# Patient Record
Sex: MALE | Race: WHITE | Employment: UNEMPLOYED | URBAN - METROPOLITAN AREA
[De-identification: names, ages, dates, MRNs, and addresses within clinical notes are randomized per-mention and may not be internally consistent; named-entity substitution may affect disease eponyms.]

---

## 2017-01-11 ENCOUNTER — OFFICE VISIT (OUTPATIENT)
Dept: INTERNAL MEDICINE CLINIC | Age: 77
End: 2017-01-11

## 2017-01-11 VITALS
SYSTOLIC BLOOD PRESSURE: 124 MMHG | HEART RATE: 97 BPM | TEMPERATURE: 98.1 F | BODY MASS INDEX: 29.19 KG/M2 | RESPIRATION RATE: 16 BRPM | WEIGHT: 192 LBS | DIASTOLIC BLOOD PRESSURE: 71 MMHG | OXYGEN SATURATION: 97 %

## 2017-01-11 DIAGNOSIS — E11.9 CONTROLLED TYPE 2 DIABETES MELLITUS WITHOUT COMPLICATION, WITHOUT LONG-TERM CURRENT USE OF INSULIN (HCC): ICD-10-CM

## 2017-01-11 DIAGNOSIS — J01.00 ACUTE NON-RECURRENT MAXILLARY SINUSITIS: Primary | ICD-10-CM

## 2017-01-11 PROCEDURE — 99213 OFFICE O/P EST LOW 20 MIN: CPT | Performed by: INTERNAL MEDICINE

## 2017-01-11 PROCEDURE — G8510 SCR DEP NEG, NO PLAN REQD: HCPCS | Performed by: INTERNAL MEDICINE

## 2017-01-11 PROCEDURE — 3288F FALL RISK ASSESSMENT DOCD: CPT | Performed by: INTERNAL MEDICINE

## 2017-01-11 RX ORDER — FLUTICASONE PROPIONATE 50 MCG
2 SPRAY, SUSPENSION (ML) NASAL DAILY
Qty: 1 BOTTLE | Refills: 0 | Status: SHIPPED | OUTPATIENT
Start: 2017-01-11 | End: 2017-02-27 | Stop reason: SDUPTHER

## 2017-01-11 RX ORDER — LEVOFLOXACIN 500 MG/1
500 TABLET, FILM COATED ORAL DAILY
Qty: 10 TABLET | Refills: 0 | Status: SHIPPED | OUTPATIENT
Start: 2017-01-11 | End: 2017-01-21

## 2017-01-11 ASSESSMENT — PATIENT HEALTH QUESTIONNAIRE - PHQ9
2. FEELING DOWN, DEPRESSED OR HOPELESS: 0
SUM OF ALL RESPONSES TO PHQ9 QUESTIONS 1 & 2: 0
1. LITTLE INTEREST OR PLEASURE IN DOING THINGS: 0
SUM OF ALL RESPONSES TO PHQ QUESTIONS 1-9: 0

## 2017-02-14 ENCOUNTER — OFFICE VISIT (OUTPATIENT)
Dept: INTERNAL MEDICINE CLINIC | Age: 77
End: 2017-02-14

## 2017-02-14 VITALS
RESPIRATION RATE: 14 BRPM | SYSTOLIC BLOOD PRESSURE: 122 MMHG | BODY MASS INDEX: 29.19 KG/M2 | WEIGHT: 192 LBS | HEART RATE: 83 BPM | DIASTOLIC BLOOD PRESSURE: 82 MMHG | OXYGEN SATURATION: 97 %

## 2017-02-14 DIAGNOSIS — Z23 NEED FOR PNEUMOCOCCAL VACCINATION: ICD-10-CM

## 2017-02-14 DIAGNOSIS — E11.9 CONTROLLED TYPE 2 DIABETES MELLITUS WITHOUT COMPLICATION, WITHOUT LONG-TERM CURRENT USE OF INSULIN (HCC): ICD-10-CM

## 2017-02-14 DIAGNOSIS — H35.3210: ICD-10-CM

## 2017-02-14 DIAGNOSIS — I70.0 ATHEROSCLEROSIS OF AORTA (HCC): ICD-10-CM

## 2017-02-14 DIAGNOSIS — E78.2 MIXED HYPERLIPIDEMIA: ICD-10-CM

## 2017-02-14 DIAGNOSIS — C18.9 ADENOCARCINOMA OF COLON (HCC): ICD-10-CM

## 2017-02-14 DIAGNOSIS — I10 ESSENTIAL HYPERTENSION: ICD-10-CM

## 2017-02-14 DIAGNOSIS — E11.9 CONTROLLED TYPE 2 DIABETES MELLITUS WITHOUT COMPLICATION, WITHOUT LONG-TERM CURRENT USE OF INSULIN (HCC): Primary | ICD-10-CM

## 2017-02-14 DIAGNOSIS — F41.9 ANXIETY: ICD-10-CM

## 2017-02-14 LAB
A/G RATIO: 2.4 (ref 1.1–2.2)
ALBUMIN SERPL-MCNC: 5.1 G/DL (ref 3.4–5)
ALP BLD-CCNC: 76 U/L (ref 40–129)
ALT SERPL-CCNC: 22 U/L (ref 10–40)
ANION GAP SERPL CALCULATED.3IONS-SCNC: 17 MMOL/L (ref 3–16)
AST SERPL-CCNC: 23 U/L (ref 15–37)
BASOPHILS ABSOLUTE: 0 K/UL (ref 0–0.2)
BASOPHILS RELATIVE PERCENT: 1.2 %
BILIRUB SERPL-MCNC: 0.8 MG/DL (ref 0–1)
BUN BLDV-MCNC: 34 MG/DL (ref 7–20)
CALCIUM SERPL-MCNC: 9.8 MG/DL (ref 8.3–10.6)
CHLORIDE BLD-SCNC: 100 MMOL/L (ref 99–110)
CHOLESTEROL, TOTAL: 149 MG/DL (ref 0–199)
CO2: 27 MMOL/L (ref 21–32)
CREAT SERPL-MCNC: 1.4 MG/DL (ref 0.8–1.3)
EOSINOPHILS ABSOLUTE: 0.1 K/UL (ref 0–0.6)
EOSINOPHILS RELATIVE PERCENT: 2.7 %
GFR AFRICAN AMERICAN: 60
GFR NON-AFRICAN AMERICAN: 49
GLOBULIN: 2.1 G/DL
GLUCOSE BLD-MCNC: 182 MG/DL (ref 70–99)
HCT VFR BLD CALC: 45.5 % (ref 40.5–52.5)
HDLC SERPL-MCNC: 57 MG/DL (ref 40–60)
HEMOGLOBIN: 15 G/DL (ref 13.5–17.5)
LDL CHOLESTEROL CALCULATED: 67 MG/DL
LYMPHOCYTES ABSOLUTE: 0.7 K/UL (ref 1–5.1)
LYMPHOCYTES RELATIVE PERCENT: 20.3 %
MCH RBC QN AUTO: 31.6 PG (ref 26–34)
MCHC RBC AUTO-ENTMCNC: 33 G/DL (ref 31–36)
MCV RBC AUTO: 95.9 FL (ref 80–100)
MONOCYTES ABSOLUTE: 0.3 K/UL (ref 0–1.3)
MONOCYTES RELATIVE PERCENT: 9.5 %
NEUTROPHILS ABSOLUTE: 2.4 K/UL (ref 1.7–7.7)
NEUTROPHILS RELATIVE PERCENT: 66.3 %
PDW BLD-RTO: 13.9 % (ref 12.4–15.4)
PLATELET # BLD: 150 K/UL (ref 135–450)
PMV BLD AUTO: 10.6 FL (ref 5–10.5)
POTASSIUM SERPL-SCNC: 4.8 MMOL/L (ref 3.5–5.1)
RBC # BLD: 4.74 M/UL (ref 4.2–5.9)
SODIUM BLD-SCNC: 144 MMOL/L (ref 136–145)
TOTAL PROTEIN: 7.2 G/DL (ref 6.4–8.2)
TRIGL SERPL-MCNC: 125 MG/DL (ref 0–150)
VLDLC SERPL CALC-MCNC: 25 MG/DL
WBC # BLD: 3.6 K/UL (ref 4–11)

## 2017-02-14 PROCEDURE — 90670 PCV13 VACCINE IM: CPT | Performed by: INTERNAL MEDICINE

## 2017-02-14 PROCEDURE — G0009 ADMIN PNEUMOCOCCAL VACCINE: HCPCS | Performed by: INTERNAL MEDICINE

## 2017-02-14 PROCEDURE — 99214 OFFICE O/P EST MOD 30 MIN: CPT | Performed by: INTERNAL MEDICINE

## 2017-02-14 RX ORDER — HYDROCHLOROTHIAZIDE 25 MG/1
TABLET ORAL
Qty: 30 TABLET | Refills: 5 | Status: SHIPPED | OUTPATIENT
Start: 2017-02-14 | End: 2017-05-16 | Stop reason: SDUPTHER

## 2017-02-14 RX ORDER — GLIPIZIDE 5 MG/1
TABLET, FILM COATED, EXTENDED RELEASE ORAL
Qty: 60 TABLET | Refills: 5 | Status: SHIPPED | OUTPATIENT
Start: 2017-02-14 | End: 2017-02-15 | Stop reason: SDUPTHER

## 2017-02-14 RX ORDER — LISINOPRIL 40 MG/1
40 TABLET ORAL DAILY
Qty: 30 TABLET | Refills: 5 | Status: SHIPPED | OUTPATIENT
Start: 2017-02-14 | End: 2018-05-15 | Stop reason: SDUPTHER

## 2017-02-14 RX ORDER — AMLODIPINE BESYLATE 2.5 MG/1
TABLET ORAL
Qty: 30 TABLET | Refills: 5 | Status: SHIPPED | OUTPATIENT
Start: 2017-02-14 | End: 2017-05-16 | Stop reason: SDUPTHER

## 2017-02-14 RX ORDER — ALPRAZOLAM 0.25 MG/1
0.25 TABLET ORAL 2 TIMES DAILY PRN
Qty: 60 TABLET | Refills: 1 | Status: CANCELLED | OUTPATIENT
Start: 2017-02-14

## 2017-02-14 RX ORDER — ROSUVASTATIN CALCIUM 10 MG/1
10 TABLET, COATED ORAL DAILY
Qty: 30 TABLET | Refills: 5 | Status: CANCELLED | OUTPATIENT
Start: 2017-02-14

## 2017-02-15 LAB
ESTIMATED AVERAGE GLUCOSE: 197.3 MG/DL
HBA1C MFR BLD: 8.5 %

## 2017-02-15 RX ORDER — GLIPIZIDE 5 MG/1
TABLET, FILM COATED, EXTENDED RELEASE ORAL
Qty: 60 TABLET | Refills: 5
Start: 2017-02-15 | End: 2017-05-16 | Stop reason: SDUPTHER

## 2017-02-27 ENCOUNTER — OFFICE VISIT (OUTPATIENT)
Dept: INTERNAL MEDICINE CLINIC | Age: 77
End: 2017-02-27

## 2017-02-27 VITALS — OXYGEN SATURATION: 98 % | DIASTOLIC BLOOD PRESSURE: 88 MMHG | TEMPERATURE: 74 F | SYSTOLIC BLOOD PRESSURE: 174 MMHG

## 2017-02-27 DIAGNOSIS — J01.00 ACUTE NON-RECURRENT MAXILLARY SINUSITIS: ICD-10-CM

## 2017-02-27 PROCEDURE — 99213 OFFICE O/P EST LOW 20 MIN: CPT | Performed by: NURSE PRACTITIONER

## 2017-02-27 RX ORDER — CETIRIZINE HYDROCHLORIDE 10 MG/1
10 TABLET ORAL DAILY
Qty: 30 TABLET | Refills: 3 | Status: ON HOLD | OUTPATIENT
Start: 2017-02-27 | End: 2017-12-09 | Stop reason: SDDI

## 2017-02-27 RX ORDER — FLUTICASONE PROPIONATE 50 MCG
2 SPRAY, SUSPENSION (ML) NASAL DAILY
Qty: 1 BOTTLE | Refills: 0 | Status: SHIPPED | OUTPATIENT
Start: 2017-02-27 | End: 2021-01-18

## 2017-03-06 ENCOUNTER — HOSPITAL ENCOUNTER (OUTPATIENT)
Dept: ULTRASOUND IMAGING | Age: 77
Discharge: HOME | End: 2017-03-06
Attending: INTERNAL MEDICINE | Admitting: INTERNAL MEDICINE

## 2017-03-06 DIAGNOSIS — I70.0 ATHEROSCLEROSIS OF AORTA (HCC): ICD-10-CM

## 2017-05-04 DIAGNOSIS — I10 ESSENTIAL HYPERTENSION: ICD-10-CM

## 2017-05-04 RX ORDER — AMLODIPINE BESYLATE 2.5 MG/1
TABLET ORAL
Qty: 30 TABLET | Refills: 5 | Status: SHIPPED | OUTPATIENT
Start: 2017-05-04 | End: 2017-08-14

## 2017-05-14 DIAGNOSIS — E11.9 CONTROLLED TYPE 2 DIABETES MELLITUS WITHOUT COMPLICATION, WITHOUT LONG-TERM CURRENT USE OF INSULIN (HCC): ICD-10-CM

## 2017-05-14 DIAGNOSIS — I10 ESSENTIAL HYPERTENSION: ICD-10-CM

## 2017-05-15 RX ORDER — LISINOPRIL 40 MG/1
TABLET ORAL
Qty: 30 TABLET | Refills: 5 | Status: SHIPPED | OUTPATIENT
Start: 2017-05-15 | End: 2017-10-31 | Stop reason: SDUPTHER

## 2017-05-16 ENCOUNTER — OFFICE VISIT (OUTPATIENT)
Dept: INTERNAL MEDICINE CLINIC | Age: 77
End: 2017-05-16

## 2017-05-16 VITALS
BODY MASS INDEX: 28.95 KG/M2 | WEIGHT: 191 LBS | SYSTOLIC BLOOD PRESSURE: 122 MMHG | RESPIRATION RATE: 16 BRPM | HEIGHT: 68 IN | HEART RATE: 87 BPM | OXYGEN SATURATION: 97 % | DIASTOLIC BLOOD PRESSURE: 80 MMHG

## 2017-05-16 DIAGNOSIS — B35.1 ONYCHOMYCOSIS: ICD-10-CM

## 2017-05-16 DIAGNOSIS — E11.9 CONTROLLED TYPE 2 DIABETES MELLITUS WITHOUT COMPLICATION, WITHOUT LONG-TERM CURRENT USE OF INSULIN (HCC): ICD-10-CM

## 2017-05-16 DIAGNOSIS — E11.9 CONTROLLED TYPE 2 DIABETES MELLITUS WITHOUT COMPLICATION, WITHOUT LONG-TERM CURRENT USE OF INSULIN (HCC): Primary | ICD-10-CM

## 2017-05-16 DIAGNOSIS — I10 ESSENTIAL HYPERTENSION: ICD-10-CM

## 2017-05-16 DIAGNOSIS — E78.2 MIXED HYPERLIPIDEMIA: ICD-10-CM

## 2017-05-16 LAB
A/G RATIO: 2.3 (ref 1.1–2.2)
ALBUMIN SERPL-MCNC: 4.6 G/DL (ref 3.4–5)
ALP BLD-CCNC: 66 U/L (ref 40–129)
ALT SERPL-CCNC: 17 U/L (ref 10–40)
ANION GAP SERPL CALCULATED.3IONS-SCNC: 17 MMOL/L (ref 3–16)
AST SERPL-CCNC: 22 U/L (ref 15–37)
BASOPHILS ABSOLUTE: 0 K/UL (ref 0–0.2)
BASOPHILS RELATIVE PERCENT: 0.5 %
BILIRUB SERPL-MCNC: 0.7 MG/DL (ref 0–1)
BUN BLDV-MCNC: 27 MG/DL (ref 7–20)
CALCIUM SERPL-MCNC: 9 MG/DL (ref 8.3–10.6)
CHLORIDE BLD-SCNC: 98 MMOL/L (ref 99–110)
CHOLESTEROL, TOTAL: 123 MG/DL (ref 0–199)
CO2: 24 MMOL/L (ref 21–32)
CREAT SERPL-MCNC: 1.2 MG/DL (ref 0.8–1.3)
CREATININE URINE: 151.3 MG/DL (ref 39–259)
EOSINOPHILS ABSOLUTE: 0.1 K/UL (ref 0–0.6)
EOSINOPHILS RELATIVE PERCENT: 2.9 %
GFR AFRICAN AMERICAN: >60
GFR NON-AFRICAN AMERICAN: 59
GLOBULIN: 2 G/DL
GLUCOSE BLD-MCNC: 157 MG/DL (ref 70–99)
HCT VFR BLD CALC: 45.3 % (ref 40.5–52.5)
HDLC SERPL-MCNC: 46 MG/DL (ref 40–60)
HEMOGLOBIN: 14.8 G/DL (ref 13.5–17.5)
LDL CHOLESTEROL CALCULATED: 59 MG/DL
LYMPHOCYTES ABSOLUTE: 0.8 K/UL (ref 1–5.1)
LYMPHOCYTES RELATIVE PERCENT: 24.9 %
MCH RBC QN AUTO: 31.7 PG (ref 26–34)
MCHC RBC AUTO-ENTMCNC: 32.7 G/DL (ref 31–36)
MCV RBC AUTO: 96.9 FL (ref 80–100)
MICROALBUMIN UR-MCNC: 3 MG/DL
MICROALBUMIN/CREAT UR-RTO: 19.8 MG/G (ref 0–30)
MONOCYTES ABSOLUTE: 0.3 K/UL (ref 0–1.3)
MONOCYTES RELATIVE PERCENT: 8.8 %
NEUTROPHILS ABSOLUTE: 2 K/UL (ref 1.7–7.7)
NEUTROPHILS RELATIVE PERCENT: 62.9 %
PDW BLD-RTO: 13.5 % (ref 12.4–15.4)
PLATELET # BLD: 148 K/UL (ref 135–450)
PMV BLD AUTO: 10.4 FL (ref 5–10.5)
POTASSIUM SERPL-SCNC: 4.1 MMOL/L (ref 3.5–5.1)
RBC # BLD: 4.67 M/UL (ref 4.2–5.9)
SODIUM BLD-SCNC: 139 MMOL/L (ref 136–145)
TOTAL PROTEIN: 6.6 G/DL (ref 6.4–8.2)
TRIGL SERPL-MCNC: 92 MG/DL (ref 0–150)
VLDLC SERPL CALC-MCNC: 18 MG/DL
WBC # BLD: 3.1 K/UL (ref 4–11)

## 2017-05-16 PROCEDURE — 99214 OFFICE O/P EST MOD 30 MIN: CPT | Performed by: INTERNAL MEDICINE

## 2017-05-16 RX ORDER — GLIPIZIDE 5 MG/1
TABLET, FILM COATED, EXTENDED RELEASE ORAL
Qty: 60 TABLET | Refills: 5 | Status: SHIPPED | OUTPATIENT
Start: 2017-05-16 | End: 2017-11-14 | Stop reason: SDUPTHER

## 2017-05-16 RX ORDER — KETOCONAZOLE 20 MG/G
CREAM TOPICAL
Qty: 30 G | Refills: 3 | Status: SHIPPED | OUTPATIENT
Start: 2017-05-16 | End: 2019-07-16 | Stop reason: ALTCHOICE

## 2017-05-16 RX ORDER — ROSUVASTATIN CALCIUM 10 MG/1
10 TABLET, COATED ORAL DAILY
Qty: 30 TABLET | Refills: 5 | Status: SHIPPED | OUTPATIENT
Start: 2017-05-16 | End: 2017-11-14 | Stop reason: SDUPTHER

## 2017-05-16 RX ORDER — AMLODIPINE BESYLATE 2.5 MG/1
TABLET ORAL
Qty: 30 TABLET | Refills: 5 | Status: SHIPPED | OUTPATIENT
Start: 2017-05-16 | End: 2017-11-14 | Stop reason: SDUPTHER

## 2017-05-16 RX ORDER — HYDROCHLOROTHIAZIDE 25 MG/1
TABLET ORAL
Qty: 30 TABLET | Refills: 5 | Status: SHIPPED | OUTPATIENT
Start: 2017-05-16 | End: 2017-11-14 | Stop reason: SDUPTHER

## 2017-05-17 LAB
ESTIMATED AVERAGE GLUCOSE: 157.1 MG/DL
HBA1C MFR BLD: 7.1 %

## 2017-05-26 ENCOUNTER — OFFICE VISIT (OUTPATIENT)
Dept: INTERNAL MEDICINE CLINIC | Age: 77
End: 2017-05-26

## 2017-05-26 VITALS
BODY MASS INDEX: 29.25 KG/M2 | DIASTOLIC BLOOD PRESSURE: 82 MMHG | HEIGHT: 68 IN | SYSTOLIC BLOOD PRESSURE: 144 MMHG | HEART RATE: 64 BPM | RESPIRATION RATE: 18 BRPM | OXYGEN SATURATION: 98 % | WEIGHT: 193 LBS | TEMPERATURE: 98 F

## 2017-05-26 DIAGNOSIS — I88.9 CERVICAL LYMPHADENITIS: ICD-10-CM

## 2017-05-26 DIAGNOSIS — H60.391 OTHER INFECTIVE ACUTE OTITIS EXTERNA OF RIGHT EAR: Primary | ICD-10-CM

## 2017-05-26 PROCEDURE — 99213 OFFICE O/P EST LOW 20 MIN: CPT | Performed by: INTERNAL MEDICINE

## 2017-05-26 RX ORDER — SULFAMETHOXAZOLE AND TRIMETHOPRIM 800; 160 MG/1; MG/1
1 TABLET ORAL 2 TIMES DAILY
Qty: 20 TABLET | Refills: 0 | Status: SHIPPED | OUTPATIENT
Start: 2017-05-26 | End: 2017-06-05

## 2017-05-31 ENCOUNTER — OFFICE VISIT (OUTPATIENT)
Dept: INTERNAL MEDICINE CLINIC | Age: 77
End: 2017-05-31

## 2017-05-31 VITALS
DIASTOLIC BLOOD PRESSURE: 76 MMHG | BODY MASS INDEX: 29.25 KG/M2 | WEIGHT: 193 LBS | HEIGHT: 68 IN | OXYGEN SATURATION: 97 % | HEART RATE: 76 BPM | RESPIRATION RATE: 16 BRPM | SYSTOLIC BLOOD PRESSURE: 128 MMHG

## 2017-05-31 DIAGNOSIS — H60.12 CELLULITIS OF LEFT EAR: Primary | ICD-10-CM

## 2017-05-31 PROCEDURE — 99213 OFFICE O/P EST LOW 20 MIN: CPT | Performed by: INTERNAL MEDICINE

## 2017-05-31 RX ORDER — AMOXICILLIN AND CLAVULANATE POTASSIUM 875; 125 MG/1; MG/1
1 TABLET, FILM COATED ORAL 2 TIMES DAILY WITH MEALS
Qty: 20 TABLET | Refills: 0 | Status: SHIPPED | OUTPATIENT
Start: 2017-05-31 | End: 2017-06-07

## 2017-05-31 RX ORDER — CLINDAMYCIN HYDROCHLORIDE 300 MG/1
300 CAPSULE ORAL 3 TIMES DAILY
Qty: 30 CAPSULE | Refills: 0 | Status: SHIPPED | OUTPATIENT
Start: 2017-05-31 | End: 2017-06-10

## 2017-05-31 RX ORDER — PREDNISONE 1 MG/1
TABLET ORAL
Qty: 36 TABLET | Refills: 0 | Status: SHIPPED | OUTPATIENT
Start: 2017-05-31 | End: 2017-06-07

## 2017-06-07 ENCOUNTER — OFFICE VISIT (OUTPATIENT)
Dept: INTERNAL MEDICINE CLINIC | Age: 77
End: 2017-06-07

## 2017-06-07 VITALS
HEART RATE: 83 BPM | BODY MASS INDEX: 28.74 KG/M2 | SYSTOLIC BLOOD PRESSURE: 120 MMHG | OXYGEN SATURATION: 97 % | RESPIRATION RATE: 16 BRPM | DIASTOLIC BLOOD PRESSURE: 70 MMHG | WEIGHT: 189 LBS

## 2017-06-07 DIAGNOSIS — E11.9 CONTROLLED TYPE 2 DIABETES MELLITUS WITHOUT COMPLICATION, WITHOUT LONG-TERM CURRENT USE OF INSULIN (HCC): ICD-10-CM

## 2017-06-07 DIAGNOSIS — H60.12 CELLULITIS OF LEFT EXTERNAL EAR: Primary | ICD-10-CM

## 2017-06-07 PROCEDURE — 99213 OFFICE O/P EST LOW 20 MIN: CPT | Performed by: INTERNAL MEDICINE

## 2017-07-20 ENCOUNTER — OFFICE VISIT (OUTPATIENT)
Dept: INTERNAL MEDICINE CLINIC | Age: 77
End: 2017-07-20

## 2017-07-20 VITALS
DIASTOLIC BLOOD PRESSURE: 92 MMHG | SYSTOLIC BLOOD PRESSURE: 172 MMHG | OXYGEN SATURATION: 96 % | BODY MASS INDEX: 28.89 KG/M2 | RESPIRATION RATE: 16 BRPM | HEART RATE: 83 BPM | WEIGHT: 190 LBS

## 2017-07-20 DIAGNOSIS — I10 ESSENTIAL HYPERTENSION: Primary | ICD-10-CM

## 2017-07-20 PROCEDURE — 99213 OFFICE O/P EST LOW 20 MIN: CPT | Performed by: INTERNAL MEDICINE

## 2017-07-20 RX ORDER — PROPRANOLOL HYDROCHLORIDE 10 MG/1
10 TABLET ORAL 2 TIMES DAILY
Qty: 60 TABLET | Refills: 3 | Status: SHIPPED | OUTPATIENT
Start: 2017-07-20 | End: 2017-11-04 | Stop reason: SDUPTHER

## 2017-08-14 ENCOUNTER — OFFICE VISIT (OUTPATIENT)
Dept: INTERNAL MEDICINE CLINIC | Age: 77
End: 2017-08-14

## 2017-08-14 ENCOUNTER — HOSPITAL ENCOUNTER (OUTPATIENT)
Dept: GENERAL RADIOLOGY | Age: 77
Discharge: HOME | End: 2017-08-14
Attending: INTERNAL MEDICINE | Admitting: INTERNAL MEDICINE

## 2017-08-14 VITALS
BODY MASS INDEX: 29.07 KG/M2 | DIASTOLIC BLOOD PRESSURE: 80 MMHG | OXYGEN SATURATION: 98 % | SYSTOLIC BLOOD PRESSURE: 140 MMHG | WEIGHT: 191.2 LBS | HEART RATE: 58 BPM

## 2017-08-14 DIAGNOSIS — Z00.00 ROUTINE GENERAL MEDICAL EXAMINATION AT A HEALTH CARE FACILITY: Primary | ICD-10-CM

## 2017-08-14 DIAGNOSIS — E78.2 MIXED HYPERLIPIDEMIA: ICD-10-CM

## 2017-08-14 DIAGNOSIS — F41.9 ANXIETY: ICD-10-CM

## 2017-08-14 DIAGNOSIS — M25.551 RIGHT HIP PAIN: ICD-10-CM

## 2017-08-14 DIAGNOSIS — I10 ESSENTIAL HYPERTENSION: ICD-10-CM

## 2017-08-14 DIAGNOSIS — Z23 NEED FOR INFLUENZA VACCINATION: ICD-10-CM

## 2017-08-14 DIAGNOSIS — C18.9 ADENOCARCINOMA OF COLON (HCC): ICD-10-CM

## 2017-08-14 DIAGNOSIS — E11.9 CONTROLLED TYPE 2 DIABETES MELLITUS WITHOUT COMPLICATION, WITHOUT LONG-TERM CURRENT USE OF INSULIN (HCC): ICD-10-CM

## 2017-08-14 PROCEDURE — G0439 PPPS, SUBSEQ VISIT: HCPCS | Performed by: INTERNAL MEDICINE

## 2017-08-14 PROCEDURE — 90662 IIV NO PRSV INCREASED AG IM: CPT | Performed by: INTERNAL MEDICINE

## 2017-08-14 PROCEDURE — G0008 ADMIN INFLUENZA VIRUS VAC: HCPCS | Performed by: INTERNAL MEDICINE

## 2017-08-14 RX ORDER — BUSPIRONE HYDROCHLORIDE 10 MG/1
10 TABLET ORAL 2 TIMES DAILY PRN
Qty: 60 TABLET | Refills: 5 | Status: ON HOLD | OUTPATIENT
Start: 2017-08-14 | End: 2017-12-09 | Stop reason: SDDI

## 2017-08-14 ASSESSMENT — LIFESTYLE VARIABLES: HOW OFTEN DO YOU HAVE A DRINK CONTAINING ALCOHOL: 0

## 2017-08-14 ASSESSMENT — PATIENT HEALTH QUESTIONNAIRE - PHQ9: SUM OF ALL RESPONSES TO PHQ QUESTIONS 1-9: 0

## 2017-08-14 ASSESSMENT — ANXIETY QUESTIONNAIRES: GAD7 TOTAL SCORE: 0

## 2017-08-15 ENCOUNTER — TELEPHONE (OUTPATIENT)
Dept: INTERNAL MEDICINE CLINIC | Age: 77
End: 2017-08-15

## 2017-08-15 RX ORDER — MELOXICAM 7.5 MG/1
7.5 TABLET ORAL DAILY
Qty: 30 TABLET | Refills: 1 | Status: SHIPPED | OUTPATIENT
Start: 2017-08-15 | End: 2017-11-14

## 2017-09-21 DIAGNOSIS — I10 ESSENTIAL HYPERTENSION: ICD-10-CM

## 2017-09-21 DIAGNOSIS — E78.2 MIXED HYPERLIPIDEMIA: ICD-10-CM

## 2017-09-21 DIAGNOSIS — E11.9 CONTROLLED TYPE 2 DIABETES MELLITUS WITHOUT COMPLICATION, WITHOUT LONG-TERM CURRENT USE OF INSULIN (HCC): ICD-10-CM

## 2017-09-21 LAB
A/G RATIO: 2.1 (ref 1.1–2.2)
ALBUMIN SERPL-MCNC: 4.5 G/DL (ref 3.4–5)
ALP BLD-CCNC: 61 U/L (ref 40–129)
ALT SERPL-CCNC: 16 U/L (ref 10–40)
ANION GAP SERPL CALCULATED.3IONS-SCNC: 19 MMOL/L (ref 3–16)
AST SERPL-CCNC: 19 U/L (ref 15–37)
BILIRUB SERPL-MCNC: 0.8 MG/DL (ref 0–1)
BUN BLDV-MCNC: 25 MG/DL (ref 7–20)
CALCIUM SERPL-MCNC: 9.4 MG/DL (ref 8.3–10.6)
CHLORIDE BLD-SCNC: 99 MMOL/L (ref 99–110)
CHOLESTEROL, TOTAL: 135 MG/DL (ref 0–199)
CO2: 24 MMOL/L (ref 21–32)
CREAT SERPL-MCNC: 1.2 MG/DL (ref 0.8–1.3)
GFR AFRICAN AMERICAN: >60
GFR NON-AFRICAN AMERICAN: 59
GLOBULIN: 2.1 G/DL
GLUCOSE BLD-MCNC: 180 MG/DL (ref 70–99)
HDLC SERPL-MCNC: 49 MG/DL (ref 40–60)
LDL CHOLESTEROL CALCULATED: 62 MG/DL
POTASSIUM SERPL-SCNC: 4 MMOL/L (ref 3.5–5.1)
SODIUM BLD-SCNC: 142 MMOL/L (ref 136–145)
TOTAL PROTEIN: 6.6 G/DL (ref 6.4–8.2)
TRIGL SERPL-MCNC: 119 MG/DL (ref 0–150)
TSH SERPL DL<=0.05 MIU/L-ACNC: 3.26 UIU/ML (ref 0.27–4.2)
VLDLC SERPL CALC-MCNC: 24 MG/DL

## 2017-09-22 LAB
ESTIMATED AVERAGE GLUCOSE: 174.3 MG/DL
HBA1C MFR BLD: 7.7 %

## 2017-10-31 DIAGNOSIS — I10 ESSENTIAL HYPERTENSION: ICD-10-CM

## 2017-10-31 DIAGNOSIS — E11.9 CONTROLLED TYPE 2 DIABETES MELLITUS WITHOUT COMPLICATION, WITHOUT LONG-TERM CURRENT USE OF INSULIN (HCC): ICD-10-CM

## 2017-10-31 RX ORDER — LISINOPRIL 40 MG/1
TABLET ORAL
Qty: 30 TABLET | Refills: 5 | Status: SHIPPED | OUTPATIENT
Start: 2017-10-31 | End: 2017-11-14 | Stop reason: SDUPTHER

## 2017-11-04 DIAGNOSIS — I10 ESSENTIAL HYPERTENSION: ICD-10-CM

## 2017-11-06 RX ORDER — PROPRANOLOL HYDROCHLORIDE 10 MG/1
10 TABLET ORAL 2 TIMES DAILY
Qty: 60 TABLET | Refills: 3 | Status: SHIPPED | OUTPATIENT
Start: 2017-11-06 | End: 2018-02-27 | Stop reason: SDUPTHER

## 2017-11-14 ENCOUNTER — OFFICE VISIT (OUTPATIENT)
Dept: INTERNAL MEDICINE CLINIC | Age: 77
End: 2017-11-14

## 2017-11-14 VITALS
DIASTOLIC BLOOD PRESSURE: 90 MMHG | WEIGHT: 184 LBS | BODY MASS INDEX: 27.98 KG/M2 | OXYGEN SATURATION: 98 % | RESPIRATION RATE: 16 BRPM | HEART RATE: 79 BPM | SYSTOLIC BLOOD PRESSURE: 130 MMHG

## 2017-11-14 DIAGNOSIS — C18.9 ADENOCARCINOMA OF COLON (HCC): ICD-10-CM

## 2017-11-14 DIAGNOSIS — M25.559 ARTHRALGIA OF HIP, UNSPECIFIED LATERALITY: ICD-10-CM

## 2017-11-14 DIAGNOSIS — E11.9 CONTROLLED TYPE 2 DIABETES MELLITUS WITHOUT COMPLICATION, WITHOUT LONG-TERM CURRENT USE OF INSULIN (HCC): Primary | ICD-10-CM

## 2017-11-14 DIAGNOSIS — I10 ESSENTIAL HYPERTENSION: ICD-10-CM

## 2017-11-14 DIAGNOSIS — E78.2 MIXED HYPERLIPIDEMIA: ICD-10-CM

## 2017-11-14 PROCEDURE — 99214 OFFICE O/P EST MOD 30 MIN: CPT | Performed by: INTERNAL MEDICINE

## 2017-11-14 RX ORDER — ROSUVASTATIN CALCIUM 10 MG/1
10 TABLET, COATED ORAL DAILY
Qty: 30 TABLET | Refills: 5 | Status: SHIPPED | OUTPATIENT
Start: 2017-11-14 | End: 2018-05-15 | Stop reason: SDUPTHER

## 2017-11-14 RX ORDER — GLIPIZIDE 5 MG/1
TABLET, FILM COATED, EXTENDED RELEASE ORAL
Qty: 60 TABLET | Refills: 5 | Status: SHIPPED | OUTPATIENT
Start: 2017-11-14 | End: 2018-05-15 | Stop reason: SDUPTHER

## 2017-11-14 RX ORDER — HYDROCHLOROTHIAZIDE 25 MG/1
TABLET ORAL
Qty: 30 TABLET | Refills: 5 | Status: SHIPPED | OUTPATIENT
Start: 2017-11-14 | End: 2018-05-15 | Stop reason: SDUPTHER

## 2017-11-14 RX ORDER — AMLODIPINE BESYLATE 2.5 MG/1
TABLET ORAL
Qty: 30 TABLET | Refills: 5 | Status: SHIPPED | OUTPATIENT
Start: 2017-11-14 | End: 2018-05-15 | Stop reason: SDUPTHER

## 2017-11-14 NOTE — PROGRESS NOTES
Midge Bowels  1940 11/14/17    SUBJECTIVE:  Colon CA- for recheck 2020 as had colonoscopy Dr Rosina Stubbs in March this yr. DM- sugars stable, sl higher on inderal.    Lab Results   Component Value Date    LABA1C 7.7 09/21/2017    LABA1C 7.1 05/16/2017    LABA1C 8.5 02/14/2017     Lab Results   Component Value Date    LABMICR 3.00 (H) 05/16/2017    LDLCALC 62 09/21/2017    CREATININE 1.2 09/21/2017   due for foot exam.    Hypertension: Stable. Denies CP, SOB, cough, visual changes, dizziness, palpitations or HA. STARTED INDERAL AND RUNNING ~130S, PULSE 60S. Lipids:  Is continuing statin therapy and low fat diet. Tolerating medications w/o myalgias or GI upset. Prior hip pain is better and now off mobic. OBJECTIVE:    BP (!) 130/90   Pulse 79   Resp 16   Wt 184 lb (83.5 kg)   SpO2 98%   BMI 27.98 kg/m²     Physical Exam   Constitutional: He appears well-developed and well-nourished. No distress. HENT:   Head: Normocephalic and atraumatic. Nose: Nose normal.   Mouth/Throat: Oropharynx is clear and moist. No oropharyngeal exudate. Eyes: Conjunctivae and EOM are normal. Pupils are equal, round, and reactive to light. Right eye exhibits no discharge. Left eye exhibits no discharge. No scleral icterus. Neck: Normal range of motion. Neck supple. No tracheal deviation present. Cardiovascular: Normal rate, regular rhythm, normal heart sounds and intact distal pulses. Exam reveals no gallop and no friction rub. No murmur heard. Pulmonary/Chest: Effort normal and breath sounds normal. No respiratory distress. He has no wheezes. He has no rales. Abdominal: Soft. Bowel sounds are normal. He exhibits no distension and no mass. There is no tenderness. There is no rebound and no guarding. Musculoskeletal: He exhibits no edema or tenderness. Lymphadenopathy:     He has no cervical adenopathy. Neurological: He is alert. Skin: Skin is warm and dry.    FOOT EXAM  Visual inspection:  Deformity/amputation: absent  Skin lesions/pre-ulcerative calluses: absent  Edema: right- negative, left- negative    Sensory exam:  Monofilament sensation: normal  (minimum of 5 random plantar locations tested, avoiding callused areas - > 1 area with absence of sensation is + for neuropathy)      Pulses: normal       Psychiatric: He has a normal mood and affect. His behavior is normal.   Vitals reviewed. ASSESSMENT:    1. Controlled type 2 diabetes mellitus without complication, without long-term current use of insulin ([address]    2. Essential hypertension    3. Mixed hyperlipidemia    4. Adenocarcinoma of colon [facility])        PLAN:    Jacquelin Rodarte was seen today for 3 month follow-up. Diagnoses and all orders for this visit:    Controlled type 2 diabetes mellitus without complication, without long-term current use of insulin ([address] - DM relatively well controlled and will continue current regimen. Screening reviewed. See orders.    -     glipiZIDE (GLUCOTROL XL) 5 MG extended release tablet; TAKE 10 mg in AM and then 5 mg tablet BY mouth at HS ( Do not fill until patient calls)  -     metFORMIN (GLUCOPHAGE) 500 MG tablet; Take 1 tablet by mouth daily (with breakfast) (Do not fill until patient calls)  -     Hemoglobin A1C  -     Lipid Panel  -     Comprehensive Metabolic Panel  -     CBC Auto Differential  -      DIABETES FOOT EXAM    Essential hypertension - Blood pressure stable and will continue current regimen. Will plan periodic monitoring of renal function, electrolytes, lipid profile. -     hydrochlorothiazide (HYDRODIURIL) 25 MG tablet; TAKE 1 TABLET EVERY DAY ( Do not fill until patient calls)  -     amLODIPine (NORVASC) 2.5 MG tablet; TAKE 1 TABLET BY MOUTH DAILY  -     Lipid Panel  -     Comprehensive Metabolic Panel  -     CBC Auto Differential    Mixed hyperlipidemia - Pt will continue to work on a low fat diet and also exercise, wt loss as appropriate.   Will continue periodic

## 2017-12-08 PROBLEM — I20.0 UNSTABLE ANGINA (HCC): Status: ACTIVE | Noted: 2017-12-08

## 2017-12-10 PROBLEM — I51.81 TAKOTSUBO CARDIOMYOPATHY: Status: ACTIVE | Noted: 2017-12-10

## 2017-12-10 PROBLEM — I20.0 UNSTABLE ANGINA (HCC): Status: RESOLVED | Noted: 2017-12-08 | Resolved: 2017-12-10

## 2017-12-11 ENCOUNTER — TELEPHONE (OUTPATIENT)
Dept: INTERNAL MEDICINE CLINIC | Age: 77
End: 2017-12-11

## 2017-12-11 ENCOUNTER — OFFICE VISIT (OUTPATIENT)
Dept: INTERNAL MEDICINE CLINIC | Age: 77
End: 2017-12-11

## 2017-12-11 VITALS
OXYGEN SATURATION: 98 % | DIASTOLIC BLOOD PRESSURE: 70 MMHG | RESPIRATION RATE: 17 BRPM | HEART RATE: 78 BPM | SYSTOLIC BLOOD PRESSURE: 140 MMHG
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
## 2017-12-11 DIAGNOSIS — R31.9 HEMATURIA, UNSPECIFIED TYPE: ICD-10-CM

## 2017-12-11 DIAGNOSIS — I10 ESSENTIAL HYPERTENSION: ICD-10-CM

## 2017-12-11 DIAGNOSIS — I51.81 TAKOTSUBO CARDIOMYOPATHY: Primary | ICD-10-CM

## 2017-12-11 LAB
BILIRUBIN, POC: ABNORMAL
BLOOD URINE, POC: ABNORMAL
CLARITY, POC: CLEAR
COLOR, POC: YELLOW
GLUCOSE URINE, POC: ABNORMAL
KETONES, POC: ABNORMAL
LEUKOCYTE EST, POC: ABNORMAL
NITRITE, POC: ABNORMAL
PH, POC: 5.5
PROTEIN, POC: ABNORMAL
SPECIFIC GRAVITY, POC: 1.02
UROBILINOGEN, POC: ABNORMAL

## 2017-12-11 PROCEDURE — 81002 URINALYSIS NONAUTO W/O SCOPE: CPT | Performed by: INTERNAL MEDICINE

## 2017-12-11 PROCEDURE — 99496 TRANSJ CARE MGMT HIGH F2F 7D: CPT | Performed by: INTERNAL MEDICINE
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
## 2017-12-13 ENCOUNTER — TELEPHONE (OUTPATIENT)
Dept: INTERNAL MEDICINE CLINIC | Age: 77
End: 2017-12-13

## 2017-12-13 DIAGNOSIS — E11.9 CONTROLLED TYPE 2 DIABETES MELLITUS WITHOUT COMPLICATION, WITHOUT LONG-TERM CURRENT USE OF INSULIN (HCC): ICD-10-CM

## 2017-12-13 NOTE — TELEPHONE ENCOUNTER
rec incr both glipizide and metformin to twice/day, before breakfast and before supper.   He should call us back w feliz by [facility]

## 2017-12-15 ENCOUNTER — TELEPHONE (OUTPATIENT)
Dept: INTERNAL MEDICINE CLINIC | Age: 77
End: 2017-12-15

## 2017-12-15 NOTE — TELEPHONE ENCOUNTER
Patient called with update on his sugars. States they have improved since medication change, running in the 140's. Was 145 this morning compared to 287.

## 2017-12-27 DIAGNOSIS — R31.9 HEMATURIA, UNSPECIFIED TYPE: Primary | ICD-10-CM

## 2017-12-27 LAB
BILIRUBIN URINE: NEGATIVE
BLOOD, URINE: NEGATIVE
CLARITY: CLEAR
COLOR: YELLOW
GLUCOSE URINE: NEGATIVE MG/DL
KETONES, URINE: NEGATIVE MG/DL
LEUKOCYTE ESTERASE, URINE: NEGATIVE
MICROSCOPIC EXAMINATION: NORMAL
NITRITE, URINE: NEGATIVE
PH UA: 6
PROTEIN UA: NEGATIVE MG/DL
SPECIFIC GRAVITY UA: 1.02
URINE TYPE: NORMAL
UROBILINOGEN, URINE: 0.2 E.U./DL

## 2018-01-03 ENCOUNTER — OFFICE VISIT (OUTPATIENT)
Dept: CARDIOLOGY CLINIC | Age: 78
End: 2018-01-03

## 2018-01-03 VITALS
HEART RATE: 72 BPM | HEIGHT: 68 IN | DIASTOLIC BLOOD PRESSURE: 82 MMHG | SYSTOLIC BLOOD PRESSURE: 130 MMHG | WEIGHT: 186.6 LBS | BODY MASS INDEX: 28.28 KG/M2

## 2018-01-03 DIAGNOSIS — I10 ESSENTIAL HYPERTENSION: ICD-10-CM

## 2018-01-03 DIAGNOSIS — I70.0 ATHEROSCLEROSIS OF AORTA (HCC): ICD-10-CM

## 2018-01-03 DIAGNOSIS — E78.2 MIXED HYPERLIPIDEMIA: ICD-10-CM

## 2018-01-03 DIAGNOSIS — I51.81 TAKOTSUBO CARDIOMYOPATHY: Primary | ICD-10-CM

## 2018-01-03 DIAGNOSIS — E11.9 CONTROLLED TYPE 2 DIABETES MELLITUS WITHOUT COMPLICATION, WITHOUT LONG-TERM CURRENT USE OF INSULIN (HCC): ICD-10-CM

## 2018-01-03 PROCEDURE — G8417 CALC BMI ABV UP PARAM F/U: HCPCS | Performed by: INTERNAL MEDICINE

## 2018-01-03 PROCEDURE — 99214 OFFICE O/P EST MOD 30 MIN: CPT | Performed by: INTERNAL MEDICINE

## 2018-01-03 PROCEDURE — G8427 DOCREV CUR MEDS BY ELIG CLIN: HCPCS | Performed by: INTERNAL MEDICINE

## 2018-01-03 PROCEDURE — 4040F PNEUMOC VAC/ADMIN/RCVD: CPT | Performed by: INTERNAL MEDICINE

## 2018-01-03 PROCEDURE — 1036F TOBACCO NON-USER: CPT | Performed by: INTERNAL MEDICINE

## 2018-01-03 PROCEDURE — 1123F ACP DISCUSS/DSCN MKR DOCD: CPT | Performed by: INTERNAL MEDICINE

## 2018-01-03 PROCEDURE — 1111F DSCHRG MED/CURRENT MED MERGE: CPT | Performed by: INTERNAL MEDICINE

## 2018-01-03 PROCEDURE — G8484 FLU IMMUNIZE NO ADMIN: HCPCS | Performed by: INTERNAL MEDICINE

## 2018-01-03 PROCEDURE — G8598 ASA/ANTIPLAT THER USED: HCPCS | Performed by: INTERNAL MEDICINE
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
## 2018-01-03 NOTE — ASSESSMENT & PLAN NOTE
He was intolerant of Coreg, continue propranolol and lisinopril. Repeat echo in 2 months time to see if he needs any further intervention.

## 2018-01-05 ENCOUNTER — TELEPHONE (OUTPATIENT)
Dept: INTERNAL MEDICINE CLINIC | Age: 78
End: 2018-01-05

## 2018-01-05 DIAGNOSIS — F41.9 ANXIETY: Primary | ICD-10-CM

## 2018-01-05 RX ORDER — LORAZEPAM 0.5 MG/1
0.5 TABLET ORAL 2 TIMES DAILY PRN
Qty: 30 TABLET | Refills: 0 | Status: SHIPPED | OUTPATIENT
Start: 2018-01-05 | End: 2018-02-04

## 2018-02-06 DIAGNOSIS — F41.9 ANXIETY: Primary | ICD-10-CM

## 2018-02-06 RX ORDER — LORAZEPAM 0.5 MG/1
0.5 TABLET ORAL 2 TIMES DAILY PRN
Qty: 30 TABLET | Refills: 0 | Status: SHIPPED | OUTPATIENT
Start: 2018-02-06 | End: 2018-02-13

## 2018-02-07 RX ORDER — LORAZEPAM 0.5 MG/1
0.5 TABLET ORAL 2 TIMES DAILY PRN
Qty: 30 TABLET | Refills: 0 | Status: SHIPPED | OUTPATIENT
Start: 2018-02-07 | End: 2018-02-13 | Stop reason: SDUPTHER

## 2018-02-09 DIAGNOSIS — I10 ESSENTIAL HYPERTENSION: Primary | ICD-10-CM

## 2018-02-09 DIAGNOSIS — E78.2 MIXED HYPERLIPIDEMIA: ICD-10-CM

## 2018-02-09 DIAGNOSIS — E11.9 CONTROLLED TYPE 2 DIABETES MELLITUS WITHOUT COMPLICATION, WITHOUT LONG-TERM CURRENT USE OF INSULIN (HCC): ICD-10-CM

## 2018-02-09 LAB
A/G RATIO: 2.4 (ref 1.1–2.2)
ALBUMIN SERPL-MCNC: 4.7 G/DL (ref 3.4–5)
ALP BLD-CCNC: 63 U/L (ref 40–129)
ALT SERPL-CCNC: 17 U/L (ref 10–40)
ANION GAP SERPL CALCULATED.3IONS-SCNC: 17 MMOL/L (ref 3–16)
AST SERPL-CCNC: 20 U/L (ref 15–37)
BASOPHILS ABSOLUTE: 0 K/UL (ref 0–0.2)
BASOPHILS RELATIVE PERCENT: 0.5 %
BILIRUB SERPL-MCNC: 0.7 MG/DL (ref 0–1)
BUN BLDV-MCNC: 39 MG/DL (ref 7–20)
CALCIUM SERPL-MCNC: 9.7 MG/DL (ref 8.3–10.6)
CHLORIDE BLD-SCNC: 101 MMOL/L (ref 99–110)
CHOLESTEROL, TOTAL: 166 MG/DL (ref 0–199)
CO2: 27 MMOL/L (ref 21–32)
CREAT SERPL-MCNC: 1.6 MG/DL (ref 0.8–1.3)
EOSINOPHILS ABSOLUTE: 0.1 K/UL (ref 0–0.6)
EOSINOPHILS RELATIVE PERCENT: 4.4 %
GFR AFRICAN AMERICAN: 51
GFR NON-AFRICAN AMERICAN: 42
GLOBULIN: 2 G/DL
GLUCOSE BLD-MCNC: 191 MG/DL (ref 70–99)
HCT VFR BLD CALC: 43.3 % (ref 40.5–52.5)
HDLC SERPL-MCNC: 54 MG/DL (ref 40–60)
HEMOGLOBIN: 14.6 G/DL (ref 13.5–17.5)
LDL CHOLESTEROL CALCULATED: 95 MG/DL
LYMPHOCYTES ABSOLUTE: 0.8 K/UL (ref 1–5.1)
LYMPHOCYTES RELATIVE PERCENT: 27.9 %
MCH RBC QN AUTO: 32.1 PG (ref 26–34)
MCHC RBC AUTO-ENTMCNC: 33.8 G/DL (ref 31–36)
MCV RBC AUTO: 95 FL (ref 80–100)
MONOCYTES ABSOLUTE: 0.3 K/UL (ref 0–1.3)
MONOCYTES RELATIVE PERCENT: 10 %
NEUTROPHILS ABSOLUTE: 1.7 K/UL (ref 1.7–7.7)
NEUTROPHILS RELATIVE PERCENT: 57.2 %
PDW BLD-RTO: 13.9 % (ref 12.4–15.4)
PLATELET # BLD: 165 K/UL (ref 135–450)
PMV BLD AUTO: 11.1 FL (ref 5–10.5)
POTASSIUM SERPL-SCNC: 4.7 MMOL/L (ref 3.5–5.1)
RBC # BLD: 4.56 M/UL (ref 4.2–5.9)
SODIUM BLD-SCNC: 145 MMOL/L (ref 136–145)
TOTAL PROTEIN: 6.7 G/DL (ref 6.4–8.2)
TRIGL SERPL-MCNC: 87 MG/DL (ref 0–150)
VLDLC SERPL CALC-MCNC: 17 MG/DL
WBC # BLD: 2.9 K/UL (ref 4–11)

## 2018-02-10 LAB
ESTIMATED AVERAGE GLUCOSE: 165.7 MG/DL
HBA1C MFR BLD: 7.4 %

## 2018-02-13 ENCOUNTER — OFFICE VISIT (OUTPATIENT)
Dept: INTERNAL MEDICINE CLINIC | Age: 78
End: 2018-02-13

## 2018-02-13 VITALS
SYSTOLIC BLOOD PRESSURE: 132 MMHG | WEIGHT: 186.51 LBS | OXYGEN SATURATION: 98 % | BODY MASS INDEX: 28.27 KG/M2 | HEIGHT: 68 IN | DIASTOLIC BLOOD PRESSURE: 76 MMHG | HEART RATE: 82 BPM

## 2018-02-13 DIAGNOSIS — F41.9 ANXIETY: ICD-10-CM

## 2018-02-13 DIAGNOSIS — I51.81 TAKOTSUBO CARDIOMYOPATHY: Primary | ICD-10-CM

## 2018-02-13 DIAGNOSIS — E11.9 CONTROLLED TYPE 2 DIABETES MELLITUS WITHOUT COMPLICATION, WITHOUT LONG-TERM CURRENT USE OF INSULIN (HCC): ICD-10-CM

## 2018-02-13 DIAGNOSIS — E78.2 MIXED HYPERLIPIDEMIA: ICD-10-CM

## 2018-02-13 DIAGNOSIS — I10 ESSENTIAL HYPERTENSION: ICD-10-CM

## 2018-02-13 PROCEDURE — 1036F TOBACCO NON-USER: CPT | Performed by: INTERNAL MEDICINE

## 2018-02-13 PROCEDURE — 99214 OFFICE O/P EST MOD 30 MIN: CPT | Performed by: INTERNAL MEDICINE

## 2018-02-13 PROCEDURE — G8417 CALC BMI ABV UP PARAM F/U: HCPCS | Performed by: INTERNAL MEDICINE

## 2018-02-13 PROCEDURE — G8598 ASA/ANTIPLAT THER USED: HCPCS | Performed by: INTERNAL MEDICINE

## 2018-02-13 PROCEDURE — G8484 FLU IMMUNIZE NO ADMIN: HCPCS | Performed by: INTERNAL MEDICINE

## 2018-02-13 PROCEDURE — G8427 DOCREV CUR MEDS BY ELIG CLIN: HCPCS | Performed by: INTERNAL MEDICINE

## 2018-02-13 PROCEDURE — 4040F PNEUMOC VAC/ADMIN/RCVD: CPT | Performed by: INTERNAL MEDICINE

## 2018-02-13 PROCEDURE — 1123F ACP DISCUSS/DSCN MKR DOCD: CPT | Performed by: INTERNAL MEDICINE

## 2018-02-13 RX ORDER — LORAZEPAM 0.5 MG/1
0.5 TABLET ORAL 2 TIMES DAILY PRN
Qty: 30 TABLET | Refills: 1 | Status: SHIPPED | OUTPATIENT
Start: 2018-02-13 | End: 2018-05-15 | Stop reason: SDUPTHER

## 2018-02-14 ENCOUNTER — TELEPHONE (OUTPATIENT)
Dept: INTERNAL MEDICINE CLINIC | Age: 78
End: 2018-02-14

## 2018-02-27 DIAGNOSIS — I10 ESSENTIAL HYPERTENSION: ICD-10-CM

## 2018-02-27 RX ORDER — PROPRANOLOL HYDROCHLORIDE 10 MG/1
10 TABLET ORAL 2 TIMES DAILY
Qty: 60 TABLET | Refills: 3 | Status: SHIPPED | OUTPATIENT
Start: 2018-02-27 | End: 2018-05-15 | Stop reason: SDUPTHER

## 2018-03-06 ENCOUNTER — PROCEDURE VISIT (OUTPATIENT)
Dept: CARDIOLOGY CLINIC | Age: 78
End: 2018-03-06

## 2018-03-06 DIAGNOSIS — I70.0 ATHEROSCLEROSIS OF AORTA (HCC): ICD-10-CM

## 2018-03-06 DIAGNOSIS — I10 ESSENTIAL HYPERTENSION: ICD-10-CM

## 2018-03-06 DIAGNOSIS — E11.9 CONTROLLED TYPE 2 DIABETES MELLITUS WITHOUT COMPLICATION, WITHOUT LONG-TERM CURRENT USE OF INSULIN (HCC): ICD-10-CM

## 2018-03-06 DIAGNOSIS — I51.81 TAKOTSUBO CARDIOMYOPATHY: Primary | ICD-10-CM

## 2018-03-06 DIAGNOSIS — E78.2 MIXED HYPERLIPIDEMIA: ICD-10-CM

## 2018-03-06 PROCEDURE — 93308 TTE F-UP OR LMTD: CPT | Performed by: INTERNAL MEDICINE

## 2018-03-07 ENCOUNTER — TELEPHONE (OUTPATIENT)
Dept: CARDIOLOGY CLINIC | Age: 78
End: 2018-03-07

## 2018-04-10 ENCOUNTER — OFFICE VISIT (OUTPATIENT)
Dept: CARDIOLOGY CLINIC | Age: 78
End: 2018-04-10

## 2018-04-10 VITALS
BODY MASS INDEX: 28.19 KG/M2 | DIASTOLIC BLOOD PRESSURE: 84 MMHG | HEART RATE: 76 BPM | HEIGHT: 68 IN | WEIGHT: 186 LBS | SYSTOLIC BLOOD PRESSURE: 130 MMHG

## 2018-04-10 DIAGNOSIS — E11.9 CONTROLLED TYPE 2 DIABETES MELLITUS WITHOUT COMPLICATION, WITHOUT LONG-TERM CURRENT USE OF INSULIN (HCC): ICD-10-CM

## 2018-04-10 DIAGNOSIS — I51.81 TAKOTSUBO CARDIOMYOPATHY: Primary | ICD-10-CM

## 2018-04-10 DIAGNOSIS — I70.0 ATHEROSCLEROSIS OF AORTA (HCC): ICD-10-CM

## 2018-04-10 DIAGNOSIS — E78.2 MIXED HYPERLIPIDEMIA: ICD-10-CM

## 2018-04-10 DIAGNOSIS — I10 ESSENTIAL HYPERTENSION: ICD-10-CM

## 2018-04-10 PROCEDURE — G8427 DOCREV CUR MEDS BY ELIG CLIN: HCPCS | Performed by: INTERNAL MEDICINE

## 2018-04-10 PROCEDURE — 99214 OFFICE O/P EST MOD 30 MIN: CPT | Performed by: INTERNAL MEDICINE

## 2018-04-10 PROCEDURE — 1123F ACP DISCUSS/DSCN MKR DOCD: CPT | Performed by: INTERNAL MEDICINE

## 2018-04-10 PROCEDURE — 4040F PNEUMOC VAC/ADMIN/RCVD: CPT | Performed by: INTERNAL MEDICINE

## 2018-04-10 PROCEDURE — G8417 CALC BMI ABV UP PARAM F/U: HCPCS | Performed by: INTERNAL MEDICINE

## 2018-04-10 PROCEDURE — 1036F TOBACCO NON-USER: CPT | Performed by: INTERNAL MEDICINE

## 2018-04-10 PROCEDURE — G8598 ASA/ANTIPLAT THER USED: HCPCS | Performed by: INTERNAL MEDICINE

## 2018-04-22 DIAGNOSIS — E11.9 CONTROLLED TYPE 2 DIABETES MELLITUS WITHOUT COMPLICATION, WITHOUT LONG-TERM CURRENT USE OF INSULIN (HCC): ICD-10-CM

## 2018-04-22 DIAGNOSIS — I10 ESSENTIAL HYPERTENSION: ICD-10-CM

## 2018-04-23 RX ORDER — LISINOPRIL 40 MG/1
TABLET ORAL
Qty: 30 TABLET | Refills: 0 | Status: SHIPPED | OUTPATIENT
Start: 2018-04-23 | End: 2018-05-15 | Stop reason: SDUPTHER

## 2018-05-08 DIAGNOSIS — E11.9 CONTROLLED TYPE 2 DIABETES MELLITUS WITHOUT COMPLICATION, WITHOUT LONG-TERM CURRENT USE OF INSULIN (HCC): Primary | ICD-10-CM

## 2018-05-08 LAB
A/G RATIO: 2.5 (ref 1.1–2.2)
ALBUMIN SERPL-MCNC: 4.8 G/DL (ref 3.4–5)
ALP BLD-CCNC: 67 U/L (ref 40–129)
ALT SERPL-CCNC: 16 U/L (ref 10–40)
ANION GAP SERPL CALCULATED.3IONS-SCNC: 16 MMOL/L (ref 3–16)
AST SERPL-CCNC: 18 U/L (ref 15–37)
BILIRUB SERPL-MCNC: 0.7 MG/DL (ref 0–1)
BUN BLDV-MCNC: 28 MG/DL (ref 7–20)
CALCIUM SERPL-MCNC: 9.7 MG/DL (ref 8.3–10.6)
CHLORIDE BLD-SCNC: 96 MMOL/L (ref 99–110)
CHOLESTEROL, TOTAL: 157 MG/DL (ref 0–199)
CO2: 28 MMOL/L (ref 21–32)
CREAT SERPL-MCNC: 1.2 MG/DL (ref 0.8–1.3)
CREATININE URINE: 135.6 MG/DL (ref 39–259)
GFR AFRICAN AMERICAN: >60
GFR NON-AFRICAN AMERICAN: 59
GLOBULIN: 1.9 G/DL
GLUCOSE BLD-MCNC: 194 MG/DL (ref 70–99)
HDLC SERPL-MCNC: 51 MG/DL (ref 40–60)
LDL CHOLESTEROL CALCULATED: 80 MG/DL
MICROALBUMIN UR-MCNC: 3.4 MG/DL
MICROALBUMIN/CREAT UR-RTO: 25.1 MG/G (ref 0–30)
POTASSIUM SERPL-SCNC: 5 MMOL/L (ref 3.5–5.1)
SODIUM BLD-SCNC: 140 MMOL/L (ref 136–145)
TOTAL PROTEIN: 6.7 G/DL (ref 6.4–8.2)
TRIGL SERPL-MCNC: 129 MG/DL (ref 0–150)
VLDLC SERPL CALC-MCNC: 26 MG/DL

## 2018-05-09 LAB
ESTIMATED AVERAGE GLUCOSE: 180 MG/DL
HBA1C MFR BLD: 7.9 %

## 2018-05-15 ENCOUNTER — OFFICE VISIT (OUTPATIENT)
Dept: INTERNAL MEDICINE CLINIC | Age: 78
End: 2018-05-15

## 2018-05-15 VITALS
HEART RATE: 68 BPM | DIASTOLIC BLOOD PRESSURE: 60 MMHG | BODY MASS INDEX: 27.83 KG/M2 | WEIGHT: 183 LBS | RESPIRATION RATE: 14 BRPM | SYSTOLIC BLOOD PRESSURE: 128 MMHG | OXYGEN SATURATION: 96 %

## 2018-05-15 DIAGNOSIS — F41.9 ANXIETY: ICD-10-CM

## 2018-05-15 DIAGNOSIS — E78.2 MIXED HYPERLIPIDEMIA: ICD-10-CM

## 2018-05-15 DIAGNOSIS — E11.9 CONTROLLED TYPE 2 DIABETES MELLITUS WITHOUT COMPLICATION, WITHOUT LONG-TERM CURRENT USE OF INSULIN (HCC): Primary | ICD-10-CM

## 2018-05-15 DIAGNOSIS — I10 ESSENTIAL HYPERTENSION: ICD-10-CM

## 2018-05-15 DIAGNOSIS — Z23 NEED FOR PROPHYLACTIC VACCINATION AGAINST STREPTOCOCCUS PNEUMONIAE (PNEUMOCOCCUS): ICD-10-CM

## 2018-05-15 DIAGNOSIS — I51.81 TAKOTSUBO CARDIOMYOPATHY: ICD-10-CM

## 2018-05-15 PROCEDURE — G8427 DOCREV CUR MEDS BY ELIG CLIN: HCPCS | Performed by: INTERNAL MEDICINE

## 2018-05-15 PROCEDURE — G0009 ADMIN PNEUMOCOCCAL VACCINE: HCPCS | Performed by: INTERNAL MEDICINE

## 2018-05-15 PROCEDURE — 1036F TOBACCO NON-USER: CPT | Performed by: INTERNAL MEDICINE

## 2018-05-15 PROCEDURE — 90732 PPSV23 VACC 2 YRS+ SUBQ/IM: CPT | Performed by: INTERNAL MEDICINE

## 2018-05-15 PROCEDURE — G8417 CALC BMI ABV UP PARAM F/U: HCPCS | Performed by: INTERNAL MEDICINE

## 2018-05-15 PROCEDURE — 1123F ACP DISCUSS/DSCN MKR DOCD: CPT | Performed by: INTERNAL MEDICINE

## 2018-05-15 PROCEDURE — 4040F PNEUMOC VAC/ADMIN/RCVD: CPT | Performed by: INTERNAL MEDICINE

## 2018-05-15 PROCEDURE — 99214 OFFICE O/P EST MOD 30 MIN: CPT | Performed by: INTERNAL MEDICINE

## 2018-05-15 PROCEDURE — G8598 ASA/ANTIPLAT THER USED: HCPCS | Performed by: INTERNAL MEDICINE

## 2018-05-15 RX ORDER — GLIPIZIDE 5 MG/1
TABLET, FILM COATED, EXTENDED RELEASE ORAL
Qty: 60 TABLET | Refills: 5 | Status: SHIPPED | OUTPATIENT
Start: 2018-05-15 | End: 2019-02-11 | Stop reason: SDUPTHER

## 2018-05-15 RX ORDER — ROSUVASTATIN CALCIUM 10 MG/1
10 TABLET, COATED ORAL DAILY
Qty: 30 TABLET | Refills: 5 | Status: SHIPPED | OUTPATIENT
Start: 2018-05-15 | End: 2019-07-05 | Stop reason: SDUPTHER

## 2018-05-15 RX ORDER — AMLODIPINE BESYLATE 2.5 MG/1
TABLET ORAL
Qty: 30 TABLET | Refills: 5 | Status: SHIPPED | OUTPATIENT
Start: 2018-05-15 | End: 2018-11-06 | Stop reason: SDUPTHER

## 2018-05-15 RX ORDER — PROPRANOLOL HYDROCHLORIDE 10 MG/1
10 TABLET ORAL 2 TIMES DAILY
Qty: 60 TABLET | Refills: 3 | Status: SHIPPED | OUTPATIENT
Start: 2018-05-15 | End: 2018-08-21 | Stop reason: SDUPTHER

## 2018-05-15 RX ORDER — LISINOPRIL 40 MG/1
TABLET ORAL
Qty: 30 TABLET | Refills: 5 | Status: SHIPPED | OUTPATIENT
Start: 2018-05-15 | End: 2018-12-24 | Stop reason: SDUPTHER

## 2018-05-15 RX ORDER — LORAZEPAM 0.5 MG/1
0.5 TABLET ORAL 2 TIMES DAILY PRN
Qty: 30 TABLET | Refills: 0 | Status: SHIPPED | OUTPATIENT
Start: 2018-05-15 | End: 2018-08-21 | Stop reason: SDUPTHER

## 2018-05-15 RX ORDER — HYDROCHLOROTHIAZIDE 25 MG/1
TABLET ORAL
Qty: 30 TABLET | Refills: 5 | Status: SHIPPED | OUTPATIENT
Start: 2018-05-15 | End: 2018-12-24 | Stop reason: SDUPTHER

## 2018-06-20 ENCOUNTER — TELEPHONE (OUTPATIENT)
Dept: CARDIOLOGY CLINIC | Age: 78
End: 2018-06-20

## 2018-07-12 ENCOUNTER — TELEPHONE (OUTPATIENT)
Dept: CARDIOLOGY CLINIC | Age: 78
End: 2018-07-12

## 2018-07-12 NOTE — TELEPHONE ENCOUNTER
Monika Haro from [facility] checking status on medical records requested.  I faxed the request to [phone] on 6/20/18 and gave Josiah Bonilla ph#.

## 2018-08-16 DIAGNOSIS — E11.9 CONTROLLED TYPE 2 DIABETES MELLITUS WITHOUT COMPLICATION, WITHOUT LONG-TERM CURRENT USE OF INSULIN (HCC): Primary | ICD-10-CM

## 2018-08-16 DIAGNOSIS — I10 ESSENTIAL HYPERTENSION: ICD-10-CM

## 2018-08-16 DIAGNOSIS — E78.2 MIXED HYPERLIPIDEMIA: ICD-10-CM

## 2018-08-16 LAB
A/G RATIO: 2.4 (ref 1.1–2.2)
ALBUMIN SERPL-MCNC: 4.5 G/DL (ref 3.4–5)
ALP BLD-CCNC: 64 U/L (ref 40–129)
ALT SERPL-CCNC: 16 U/L (ref 10–40)
ANION GAP SERPL CALCULATED.3IONS-SCNC: 13 MMOL/L (ref 3–16)
AST SERPL-CCNC: 18 U/L (ref 15–37)
BILIRUB SERPL-MCNC: 0.8 MG/DL (ref 0–1)
BUN BLDV-MCNC: 27 MG/DL (ref 7–20)
CALCIUM SERPL-MCNC: 9.3 MG/DL (ref 8.3–10.6)
CHLORIDE BLD-SCNC: 100 MMOL/L (ref 99–110)
CHOLESTEROL, TOTAL: 148 MG/DL (ref 0–199)
CO2: 28 MMOL/L (ref 21–32)
CREAT SERPL-MCNC: 1.3 MG/DL (ref 0.8–1.3)
GFR AFRICAN AMERICAN: >60
GFR NON-AFRICAN AMERICAN: 53
GLOBULIN: 1.9 G/DL
GLUCOSE BLD-MCNC: 188 MG/DL (ref 70–99)
HDLC SERPL-MCNC: 41 MG/DL (ref 40–60)
LDL CHOLESTEROL CALCULATED: 83 MG/DL
POTASSIUM SERPL-SCNC: 4.3 MMOL/L (ref 3.5–5.1)
SODIUM BLD-SCNC: 141 MMOL/L (ref 136–145)
TOTAL PROTEIN: 6.4 G/DL (ref 6.4–8.2)
TRIGL SERPL-MCNC: 119 MG/DL (ref 0–150)
VLDLC SERPL CALC-MCNC: 24 MG/DL

## 2018-08-17 LAB
ESTIMATED AVERAGE GLUCOSE: 188.6 MG/DL
HBA1C MFR BLD: 8.2 %

## 2018-08-21 ENCOUNTER — OFFICE VISIT (OUTPATIENT)
Dept: INTERNAL MEDICINE CLINIC | Age: 78
End: 2018-08-21

## 2018-08-21 VITALS
OXYGEN SATURATION: 97 % | SYSTOLIC BLOOD PRESSURE: 118 MMHG | DIASTOLIC BLOOD PRESSURE: 70 MMHG | WEIGHT: 182 LBS | BODY MASS INDEX: 27.58 KG/M2 | HEART RATE: 60 BPM | HEIGHT: 68 IN | RESPIRATION RATE: 14 BRPM

## 2018-08-21 DIAGNOSIS — H35.3210 EXUDATIVE AGE-RELATED MACULAR DEGENERATION OF RIGHT EYE, UNSPECIFIED STAGE (HCC): ICD-10-CM

## 2018-08-21 DIAGNOSIS — Z00.00 ROUTINE GENERAL MEDICAL EXAMINATION AT A HEALTH CARE FACILITY: Primary | ICD-10-CM

## 2018-08-21 DIAGNOSIS — F41.9 ANXIETY: ICD-10-CM

## 2018-08-21 DIAGNOSIS — E11.9 CONTROLLED TYPE 2 DIABETES MELLITUS WITHOUT COMPLICATION, WITHOUT LONG-TERM CURRENT USE OF INSULIN (HCC): ICD-10-CM

## 2018-08-21 DIAGNOSIS — I51.81 TAKOTSUBO CARDIOMYOPATHY: ICD-10-CM

## 2018-08-21 DIAGNOSIS — Z23 NEED FOR IMMUNIZATION AGAINST INFLUENZA: ICD-10-CM

## 2018-08-21 DIAGNOSIS — I10 ESSENTIAL HYPERTENSION: ICD-10-CM

## 2018-08-21 PROCEDURE — G0008 ADMIN INFLUENZA VIRUS VAC: HCPCS | Performed by: INTERNAL MEDICINE

## 2018-08-21 PROCEDURE — 4040F PNEUMOC VAC/ADMIN/RCVD: CPT | Performed by: INTERNAL MEDICINE

## 2018-08-21 PROCEDURE — G8598 ASA/ANTIPLAT THER USED: HCPCS | Performed by: INTERNAL MEDICINE

## 2018-08-21 PROCEDURE — G0439 PPPS, SUBSEQ VISIT: HCPCS | Performed by: INTERNAL MEDICINE

## 2018-08-21 PROCEDURE — 90662 IIV NO PRSV INCREASED AG IM: CPT | Performed by: INTERNAL MEDICINE

## 2018-08-21 RX ORDER — PROPRANOLOL HYDROCHLORIDE 10 MG/1
10 TABLET ORAL 2 TIMES DAILY
Qty: 60 TABLET | Refills: 3 | Status: SHIPPED | OUTPATIENT
Start: 2018-08-21 | End: 2018-11-16 | Stop reason: SDUPTHER

## 2018-08-21 RX ORDER — LORAZEPAM 0.5 MG/1
0.5 TABLET ORAL 2 TIMES DAILY PRN
Qty: 30 TABLET | Refills: 0 | Status: SHIPPED | OUTPATIENT
Start: 2018-08-21 | End: 2018-09-20

## 2018-08-21 ASSESSMENT — PATIENT HEALTH QUESTIONNAIRE - PHQ9
SUM OF ALL RESPONSES TO PHQ QUESTIONS 1-9: 0
SUM OF ALL RESPONSES TO PHQ QUESTIONS 1-9: 0

## 2018-08-21 ASSESSMENT — LIFESTYLE VARIABLES: HOW OFTEN DO YOU HAVE A DRINK CONTAINING ALCOHOL: 0

## 2018-08-21 ASSESSMENT — ANXIETY QUESTIONNAIRES: GAD7 TOTAL SCORE: 1

## 2018-08-21 NOTE — PROGRESS NOTES
Medicare Annual Wellness Visit  Name: Haseeb Guerrero Date: 2018   MRN: [id number] Sex: Male   Age: 68 y.o. Ethnicity: Non-/Non    : 1940 Race: Lili Farrar is here for Medicare AWV and Discuss Medications (Glipizide causes dizziness in the mornings)    Screenings for behavioral, psychosocial and functional/safety risks, and cognitive dysfunction are all negative except as indicated below. These results, as well as other patient data from the [address] form, are documented in Flowsheets linked to this Encounter. DM- at times can be shaky w glipizide but does not always eat immediately after med, so rec to only take with or during the meal.    Lab Results   Component Value Date    LABA1C 8.2 2018    LABA1C 7.9 2018    LABA1C 7.4 2018     Lab Results   Component Value Date    LABMICR 3.40 (H) 2018    LDLCALC 83 2018    CREATININE 1.3 2018     Hypertension: Stable. Denies CP, SOB, cough, visual changes, dizziness, palpitations or HA. Anxiety- on ativan but at times can be more anxious, is using less and less. Controlled substances monitoring: possible medication side effects, risk of tolerance and/or dependence, and alternative treatments discussed, no signs of potential drug abuse or diversion identified and [place]AR[place] report reviewed today- activity consistent with treatment plan. Last filled . takotsubo cardiomyopathy resolved w last echo in march nl. NEEDS CARDIO CLEARANCE FOR APPROVAL TO SEE IF THIS IS DEFINITIVELY RESOLVED THOUGH. [facility], STILL GETTING SHOTS TO [facility] FR DR BEAULIEU        Allergies   Allergen Reactions    Crestor [Rosuvastatin Calcium]      Cannot take everyday upsets stomach    Lovastatin      Can not tolerate 10 mg but can tolerate 5 mg     Prior to Visit Medications    Medication Sig Taking?  Authorizing Provider   lisinopril (PRINIVIL;ZESTRIL) 40 MG tablet Take one daily appointments were made and/or referrals ordered. Positive Risk Factor Screenings with Interventions:     No Positive Risk Factors identified today. Personalized Preventive Plan   Current Health Maintenance Status  Immunization History   Administered Date(s) Administered    Influenza, High Dose 11/12/2014, 11/17/2015, 11/15/2016, 08/14/2017    Pneumococcal 13-valent Conjugate ([id number]) 02/14/2017    Pneumococcal Polysaccharide ([id number]) 05/15/2018        Health Maintenance   Topic Date Due    DTaP/Tdap/Td vaccine (1 - Tdap) 11/28/1959    Shingles Vaccine (1 of 2 - 2 Dose Series) 11/28/1990    Flu vaccine (1) 09/01/2018    Diabetic foot exam  11/14/2018    Diabetic retinal exam  11/15/2018    A1C test (Diabetic or Prediabetic)  02/16/2019    Lipid screen  08/16/2019    Potassium monitoring  08/16/2019    Creatinine monitoring  08/16/2019    Pneumococcal low/med risk  Completed     Recommendations for Preventive Services Due: see orders and patient instructions/AVS.  . Recommended screening schedule for the next 5-10 years is provided to the patient in written form: see Patient Maame Friends was seen today for medicare awv and discuss medications. Diagnoses and all orders for this visit:    Routine general medical examination at a health care facility - remains independent, functional and active, no indications/needs for other interventions noted at this time- except as noted below and also findings noted on screening medicare questions. OVERALL BETTER AS HIS PRIOR TAKOTSOBU RESOLVES    Controlled type 2 diabetes mellitus without complication, without long-term current use of insulin ([address] - TO TRY TAKING DM MEDS SPECIFICALLY W MEALS/FOOD. F/U LAB  -     Lipid Panel; Future  -     Hemoglobin A1C; Future  -     Comprehensive Metabolic Panel; Future    Anxiety- NOW W MINIMAL USE, ONE SCRIPT HAS LASTED 3MO, continue present management.   Will monitor.    -     LORazepam (ATIVAN)

## 2018-08-21 NOTE — PATIENT INSTRUCTIONS
Personalized Preventive Plan for Rudy Araya - 8/21/2018  Medicare offers a range of preventive health benefits. Some of the tests and screenings are paid in full while other may be subject to a deductible, co-insurance, and/or copay. Some of these benefits include a comprehensive review of your medical history including lifestyle, illnesses that may run in your family, and various assessments and screenings as appropriate. After reviewing your medical record and screening and assessments performed today your provider may have ordered immunizations, labs, imaging, and/or referrals for you. A list of these orders (if applicable) as well as your Preventive Care list are included within your After Visit Summary for your review. Other Preventive Recommendations:    · A preventive eye exam performed by an eye specialist is recommended every 1-2 years to screen for glaucoma; cataracts, macular degeneration, and other eye disorders. · A preventive dental visit is recommended every 6 months. · Try to get at least 150 minutes of exercise per week or 10,000 steps per day on a pedometer . · Order or download the FREE \"Exercise & Physical Activity: Your Everyday Guide\" from [employer]. Call 1-414.446.5547 or search [employer] online. · You need 7253-8084 mg of calcium and 1011-5875 IU of vitamin D per day. It is possible to meet your calcium requirement with diet alone, but a vitamin D supplement is usually necessary to meet this goal.  · When exposed to the sun, use a sunscreen that protects against both UVA and UVB radiation with an SPF of 30 or greater. Reapply every 2 to 3 hours or after sweating, drying off with a towel, or swimming. · Always wear a seat belt when traveling in a car. Always wear a helmet when riding a bicycle or motorcycle.

## 2018-08-30 ENCOUNTER — TELEPHONE (OUTPATIENT)
Dept: CARDIOLOGY CLINIC | Age: 78
End: 2018-08-30

## 2018-10-23 ENCOUNTER — OFFICE VISIT (OUTPATIENT)
Dept: CARDIOLOGY CLINIC | Age: 78
End: 2018-10-23
Payer: MEDICARE

## 2018-10-23 VITALS
SYSTOLIC BLOOD PRESSURE: 136 MMHG | DIASTOLIC BLOOD PRESSURE: 68 MMHG | HEART RATE: 70 BPM | BODY MASS INDEX: 26.96 KG/M2 | WEIGHT: 182 LBS | HEIGHT: 69 IN

## 2018-10-23 DIAGNOSIS — I51.81 TAKOTSUBO CARDIOMYOPATHY: ICD-10-CM

## 2018-10-23 DIAGNOSIS — I70.0 ATHEROSCLEROSIS OF AORTA (HCC): ICD-10-CM

## 2018-10-23 DIAGNOSIS — I10 ESSENTIAL HYPERTENSION: Primary | ICD-10-CM

## 2018-10-23 DIAGNOSIS — F41.9 ANXIETY: ICD-10-CM

## 2018-10-23 DIAGNOSIS — E78.2 MIXED HYPERLIPIDEMIA: ICD-10-CM

## 2018-10-23 PROCEDURE — G8482 FLU IMMUNIZE ORDER/ADMIN: HCPCS | Performed by: INTERNAL MEDICINE

## 2018-10-23 PROCEDURE — G8598 ASA/ANTIPLAT THER USED: HCPCS | Performed by: INTERNAL MEDICINE

## 2018-10-23 PROCEDURE — 1123F ACP DISCUSS/DSCN MKR DOCD: CPT | Performed by: INTERNAL MEDICINE

## 2018-10-23 PROCEDURE — 4040F PNEUMOC VAC/ADMIN/RCVD: CPT | Performed by: INTERNAL MEDICINE

## 2018-10-23 PROCEDURE — G8417 CALC BMI ABV UP PARAM F/U: HCPCS | Performed by: INTERNAL MEDICINE

## 2018-10-23 PROCEDURE — 99214 OFFICE O/P EST MOD 30 MIN: CPT | Performed by: INTERNAL MEDICINE

## 2018-10-23 PROCEDURE — G8427 DOCREV CUR MEDS BY ELIG CLIN: HCPCS | Performed by: INTERNAL MEDICINE

## 2018-10-23 PROCEDURE — 1036F TOBACCO NON-USER: CPT | Performed by: INTERNAL MEDICINE

## 2018-10-23 PROCEDURE — 1101F PT FALLS ASSESS-DOCD LE1/YR: CPT | Performed by: INTERNAL MEDICINE

## 2018-10-23 RX ORDER — RANITIDINE 150 MG/1
150 TABLET ORAL NIGHTLY
Qty: 60 TABLET | Refills: 3 | Status: SHIPPED | OUTPATIENT
Start: 2018-10-23 | End: 2020-02-18 | Stop reason: ALTCHOICE

## 2018-10-23 NOTE — PROGRESS NOTES
intolerance  · Hematologic/Lymphatic: No bleeding problems, blood clots or swollen lymph nodes  · Allergic/Immunologic: No nasal congestion or hives    Objective:      Physical Exam:  /68   Pulse 70   Ht 5' 9\" (1.753 m)   Wt 182 lb (82.6 kg)   BMI 26.88 kg/m²   Wt Readings from Last 3 Encounters:   10/23/18 182 lb (82.6 kg)   08/21/18 182 lb (82.6 kg)   05/15/18 183 lb (83 kg)     Body mass index is 26.88 kg/m². Vitals:    10/23/18 1005   BP: 136/68   Pulse: 70        General Appearance:  No distress, conversant  Constitutional:  Well developed, Well nourished, No acute distress, Non-toxic appearance. HENT:  Normocephalic, Atraumatic, Bilateral external ears normal, Oropharynx moist, No oral exudates, Nose normal. Neck- Normal range of motion, No tenderness, Supple, No stridor,no apical-carotid delay  Eyes:  PERRL, EOMI, Conjunctiva normal, No discharge. Respiratory:  Normal breath sounds, No respiratory distress, No wheezing, No chest tenderness. ,no use of accessory muscles,   Cardiovascular: (PMI) apex non displaced,no lifts no thrills,S1 and S2 audible, No added heart sounds, No signs of ankle edema, or volume overload, No evidence of JVD  GI:  Bowel sounds normal, Soft, No tenderness, No masses, No gross visceromegaly   :  No costovertebral angle tenderness   Musculoskeletal:  No edema, no tenderness, no deformities.  Back- no tenderness  Integument:  Well hydrated, no rash   Lymphatic:  No lymphadenopathy noted   Neurologic:  Alert & oriented x 3, CN 2-12 normal, normal motor function, normal sensory function, no focal deficits noted   Psychiatric:  Speech and behavior appropriate       Medical decision making and Data review:  DATA:  Lab Results   Component Value Date    TROPONINT 0.091 (H) 12/10/2017     BNP:  No results found for: PROBNP  PT/INR:  No results found for: PTINR  Lab Results   Component Value Date    LABA1C 8.2 08/16/2018    LABA1C 7.9 05/08/2018     Lab Results   Component Value Date    CHOL 148 08/16/2018    TRIG 119 08/16/2018    HDL 41 08/16/2018    LDLCALC 83 08/16/2018     Lab Results   Component Value Date    ALT 16 08/16/2018    AST 18 08/16/2018     TSH:   Lab Results   Component Value Date    TSH 3.26 09/21/2017     Cath 12/9/17  Procedure Summary   radial access   LV gram suggests takatsobu cardiomyopathy , anterior and mid   wall is akinetic and hyper contractile base and apex with mid   ventricle ballooning   Mid LAD has 20 % stenosis , large dominant circ   RCA gives small pda ( co-dominant )      Angiographic Findings    LMCA: Normal (no stenosis %). LAD: mid LAD has 20 % calcified lesion after diagonal take off  LCx: left dominant vessel , large calibre circ , gives PDA  RCA: co dominant gives small pda    Echo 12/9/17  Summary   Left ventricular systolic function is abnormal.   Ejection fraction is visually estimated at 30-35%.  Mid-ventricular hypokinesis/ballooning is present suggesting takotsubo   cardiomyopathy.   Grade I diastolic dysfunction.   Essentially normal chamber sizes.   No significant valvular disease noted.   No pericardial effusion present    Echo 3/6/18   Summary   Limited echo,   Left ventricular systolic function is normal with an ejection fraction of   50-60%. No regional wall motion abnormality.   No evidence of any pericardial effusion.          All labs, medications and tests reviewed by myself including data and history from outside source , patient and available family . Assessment & Plan:      1. Essential hypertension    2. Takotsubo cardiomyopathy    3. Mixed hyperlipidemia    4. Anxiety    5. Atherosclerosis of aorta ([address]         Takotsubo cardiomyopathy  Echo in Dec 2017 showed EF  30 % takatsobo cardiomyopathy pattern . He was intolerant of Coreg, But he is tolerating propranolol . continue propranolol and lisinopril. He had a  Repeat echo in March 2018  in  shows improved LV function .  Continue current meds     Essential hypertension  He

## 2018-11-01 LAB — DIABETIC RETINOPATHY: NORMAL

## 2018-11-06 DIAGNOSIS — I10 ESSENTIAL HYPERTENSION: ICD-10-CM

## 2018-11-06 RX ORDER — AMLODIPINE BESYLATE 2.5 MG/1
TABLET ORAL
Qty: 30 TABLET | Refills: 5 | Status: SHIPPED | OUTPATIENT
Start: 2018-11-06 | End: 2018-11-16 | Stop reason: SDUPTHER

## 2018-11-16 ENCOUNTER — OFFICE VISIT (OUTPATIENT)
Dept: INTERNAL MEDICINE CLINIC | Age: 78
End: 2018-11-16
Payer: MEDICARE

## 2018-11-16 VITALS
BODY MASS INDEX: 27.17 KG/M2 | RESPIRATION RATE: 16 BRPM | HEART RATE: 68 BPM | SYSTOLIC BLOOD PRESSURE: 150 MMHG | WEIGHT: 184 LBS | DIASTOLIC BLOOD PRESSURE: 80 MMHG | OXYGEN SATURATION: 97 %

## 2018-11-16 DIAGNOSIS — E11.9 CONTROLLED TYPE 2 DIABETES MELLITUS WITHOUT COMPLICATION, WITHOUT LONG-TERM CURRENT USE OF INSULIN (HCC): ICD-10-CM

## 2018-11-16 DIAGNOSIS — E78.2 MIXED HYPERLIPIDEMIA: ICD-10-CM

## 2018-11-16 DIAGNOSIS — I51.81 TAKOTSUBO CARDIOMYOPATHY: ICD-10-CM

## 2018-11-16 DIAGNOSIS — I10 ESSENTIAL HYPERTENSION: Primary | ICD-10-CM

## 2018-11-16 LAB
A/G RATIO: 2.7 (ref 1.1–2.2)
ALBUMIN SERPL-MCNC: 4.8 G/DL (ref 3.4–5)
ALP BLD-CCNC: 70 U/L (ref 40–129)
ALT SERPL-CCNC: 15 U/L (ref 10–40)
ANION GAP SERPL CALCULATED.3IONS-SCNC: 14 MMOL/L (ref 3–16)
AST SERPL-CCNC: 19 U/L (ref 15–37)
BILIRUB SERPL-MCNC: 0.7 MG/DL (ref 0–1)
BUN BLDV-MCNC: 28 MG/DL (ref 7–20)
CALCIUM SERPL-MCNC: 9.4 MG/DL (ref 8.3–10.6)
CHLORIDE BLD-SCNC: 100 MMOL/L (ref 99–110)
CHOLESTEROL, TOTAL: 132 MG/DL (ref 0–199)
CO2: 28 MMOL/L (ref 21–32)
CREAT SERPL-MCNC: 1.2 MG/DL (ref 0.8–1.3)
GFR AFRICAN AMERICAN: >60
GFR NON-AFRICAN AMERICAN: 59
GLOBULIN: 1.8 G/DL
GLUCOSE BLD-MCNC: 196 MG/DL (ref 70–99)
HDLC SERPL-MCNC: 49 MG/DL (ref 40–60)
LDL CHOLESTEROL CALCULATED: 67 MG/DL
POTASSIUM SERPL-SCNC: 4.9 MMOL/L (ref 3.5–5.1)
SODIUM BLD-SCNC: 142 MMOL/L (ref 136–145)
TOTAL PROTEIN: 6.6 G/DL (ref 6.4–8.2)
TRIGL SERPL-MCNC: 82 MG/DL (ref 0–150)
VLDLC SERPL CALC-MCNC: 16 MG/DL

## 2018-11-16 PROCEDURE — 1123F ACP DISCUSS/DSCN MKR DOCD: CPT | Performed by: INTERNAL MEDICINE

## 2018-11-16 PROCEDURE — 1036F TOBACCO NON-USER: CPT | Performed by: INTERNAL MEDICINE

## 2018-11-16 PROCEDURE — 1101F PT FALLS ASSESS-DOCD LE1/YR: CPT | Performed by: INTERNAL MEDICINE

## 2018-11-16 PROCEDURE — G8427 DOCREV CUR MEDS BY ELIG CLIN: HCPCS | Performed by: INTERNAL MEDICINE

## 2018-11-16 PROCEDURE — 4040F PNEUMOC VAC/ADMIN/RCVD: CPT | Performed by: INTERNAL MEDICINE

## 2018-11-16 PROCEDURE — G8417 CALC BMI ABV UP PARAM F/U: HCPCS | Performed by: INTERNAL MEDICINE

## 2018-11-16 PROCEDURE — 99214 OFFICE O/P EST MOD 30 MIN: CPT | Performed by: INTERNAL MEDICINE

## 2018-11-16 PROCEDURE — G8482 FLU IMMUNIZE ORDER/ADMIN: HCPCS | Performed by: INTERNAL MEDICINE

## 2018-11-16 PROCEDURE — G8598 ASA/ANTIPLAT THER USED: HCPCS | Performed by: INTERNAL MEDICINE

## 2018-11-16 RX ORDER — PROPRANOLOL HYDROCHLORIDE 20 MG/1
20 TABLET ORAL 2 TIMES DAILY
Qty: 60 TABLET | Refills: 5 | Status: SHIPPED | OUTPATIENT
Start: 2018-11-16 | End: 2018-11-26 | Stop reason: SINTOL

## 2018-11-16 RX ORDER — AMLODIPINE BESYLATE 5 MG/1
TABLET ORAL
Qty: 30 TABLET | Refills: 5 | Status: SHIPPED | OUTPATIENT
Start: 2018-11-16 | End: 2019-05-19 | Stop reason: SDUPTHER

## 2018-11-16 NOTE — PATIENT INSTRUCTIONS
For diabetes/sugars, increase metformin from 500mg to 1000mg twice/day. For BP, incr amlodipine from 2.5--5mg daily and increase propranolol from 10 to 20mg twice/day. Call us w your sugars and BP in one week.

## 2018-11-16 NOTE — PROGRESS NOTES
Tatiana Taylor  1940 11/16/18    SUBJECTIVE:  htn- BP High at home recently, up to 150s/90. Denies cp or sob. Also high at dentist's office. Denies sx headache, lt headedness. Takotsubo CMP resolved w last echo ok in March, EF 50-60%. DM- sugars also high 150-170. Saw Dr José Miguel Duarte also in Oct.    Lab Results   Component Value Date    LABA1C 8.2 08/16/2018    LABA1C 7.9 05/08/2018    LABA1C 7.4 02/09/2018     Lab Results   Component Value Date    LABMICR 3.40 (H) 05/08/2018    LDLCALC 83 08/16/2018    CREATININE 1.3 08/16/2018           OBJECTIVE:    BP (!) 150/80   Pulse 68 Comment: IRREGULAR  Resp 16   Wt 184 lb (83.5 kg)   SpO2 97%   BMI 27.17 kg/m²     Physical Exam   Constitutional: He appears well-developed and well-nourished. No distress. HENT:   Head: Normocephalic and atraumatic. Nose: Nose normal.   Mouth/Throat: Oropharynx is clear and moist. No oropharyngeal exudate. Eyes: Pupils are equal, round, and reactive to light. Conjunctivae and EOM are normal. Right eye exhibits no discharge. Left eye exhibits no discharge. No scleral icterus. Neck: Normal range of motion. Neck supple. No tracheal deviation present. Cardiovascular: Normal rate, regular rhythm, normal heart sounds and intact distal pulses. Exam reveals no gallop and no friction rub. No murmur heard. freq ectopic beats   Pulmonary/Chest: Effort normal and breath sounds normal. No respiratory distress. He has no wheezes. He has no rales. Abdominal: Soft. Bowel sounds are normal. He exhibits no distension and no mass. There is no tenderness. There is no rebound and no guarding. Musculoskeletal: He exhibits no edema or tenderness. Lymphadenopathy:     He has no cervical adenopathy. Neurological: He is alert. He has normal reflexes. Skin: Skin is warm and dry. No rash noted.    FOOT EXAM  Visual inspection:  Deformity/amputation: absent  Skin lesions/pre-ulcerative calluses: absent  Edema: right- negative,

## 2018-11-17 LAB
ESTIMATED AVERAGE GLUCOSE: 185.8 MG/DL
HBA1C MFR BLD: 8.1 %

## 2018-11-19 ENCOUNTER — TELEPHONE (OUTPATIENT)
Dept: INTERNAL MEDICINE CLINIC | Age: 78
End: 2018-11-19

## 2018-11-19 DIAGNOSIS — E11.9 CONTROLLED TYPE 2 DIABETES MELLITUS WITHOUT COMPLICATION, WITHOUT LONG-TERM CURRENT USE OF INSULIN (HCC): ICD-10-CM

## 2018-11-26 ENCOUNTER — TELEPHONE (OUTPATIENT)
Dept: INTERNAL MEDICINE CLINIC | Age: 78
End: 2018-11-26

## 2018-12-06 ENCOUNTER — TELEPHONE (OUTPATIENT)
Dept: INTERNAL MEDICINE CLINIC | Age: 78
End: 2018-12-06

## 2018-12-11 ENCOUNTER — OFFICE VISIT (OUTPATIENT)
Dept: INTERNAL MEDICINE CLINIC | Age: 78
End: 2018-12-11
Payer: MEDICARE

## 2018-12-11 VITALS
OXYGEN SATURATION: 98 % | HEART RATE: 64 BPM | DIASTOLIC BLOOD PRESSURE: 78 MMHG | SYSTOLIC BLOOD PRESSURE: 120 MMHG | WEIGHT: 183 LBS | BODY MASS INDEX: 27.02 KG/M2 | RESPIRATION RATE: 14 BRPM

## 2018-12-11 DIAGNOSIS — E11.9 CONTROLLED TYPE 2 DIABETES MELLITUS WITHOUT COMPLICATION, WITHOUT LONG-TERM CURRENT USE OF INSULIN (HCC): ICD-10-CM

## 2018-12-11 DIAGNOSIS — I10 ESSENTIAL HYPERTENSION: Primary | ICD-10-CM

## 2018-12-11 LAB — HBA1C MFR BLD: 7.6 %

## 2018-12-11 PROCEDURE — 83036 HEMOGLOBIN GLYCOSYLATED A1C: CPT | Performed by: INTERNAL MEDICINE

## 2018-12-11 PROCEDURE — G8417 CALC BMI ABV UP PARAM F/U: HCPCS | Performed by: INTERNAL MEDICINE

## 2018-12-11 PROCEDURE — 1036F TOBACCO NON-USER: CPT | Performed by: INTERNAL MEDICINE

## 2018-12-11 PROCEDURE — 99213 OFFICE O/P EST LOW 20 MIN: CPT | Performed by: INTERNAL MEDICINE

## 2018-12-11 PROCEDURE — G8482 FLU IMMUNIZE ORDER/ADMIN: HCPCS | Performed by: INTERNAL MEDICINE

## 2018-12-11 PROCEDURE — 1123F ACP DISCUSS/DSCN MKR DOCD: CPT | Performed by: INTERNAL MEDICINE

## 2018-12-11 PROCEDURE — G8598 ASA/ANTIPLAT THER USED: HCPCS | Performed by: INTERNAL MEDICINE

## 2018-12-11 PROCEDURE — G8427 DOCREV CUR MEDS BY ELIG CLIN: HCPCS | Performed by: INTERNAL MEDICINE

## 2018-12-11 PROCEDURE — 4040F PNEUMOC VAC/ADMIN/RCVD: CPT | Performed by: INTERNAL MEDICINE

## 2018-12-11 PROCEDURE — 1101F PT FALLS ASSESS-DOCD LE1/YR: CPT | Performed by: INTERNAL MEDICINE

## 2018-12-12 ENCOUNTER — TELEPHONE (OUTPATIENT)
Dept: INTERNAL MEDICINE CLINIC | Age: 78
End: 2018-12-12

## 2018-12-12 NOTE — TELEPHONE ENCOUNTER
Ed called stating he took a metoprolol 25 mg this morning. After taking the medication he experienced fatigue, sweats, and nervousness. He wondered if he could take a lower dose or what you suggested? Please advise.

## 2018-12-19 ENCOUNTER — TELEPHONE (OUTPATIENT)
Dept: CARDIOLOGY CLINIC | Age: 78
End: 2018-12-19

## 2018-12-24 DIAGNOSIS — I10 ESSENTIAL HYPERTENSION: ICD-10-CM

## 2018-12-24 DIAGNOSIS — E11.9 CONTROLLED TYPE 2 DIABETES MELLITUS WITHOUT COMPLICATION, WITHOUT LONG-TERM CURRENT USE OF INSULIN (HCC): ICD-10-CM

## 2018-12-24 RX ORDER — LISINOPRIL 40 MG/1
TABLET ORAL
Qty: 30 TABLET | Refills: 5 | Status: SHIPPED | OUTPATIENT
Start: 2018-12-24 | End: 2019-03-20 | Stop reason: SDUPTHER

## 2018-12-24 RX ORDER — HYDROCHLOROTHIAZIDE 25 MG/1
TABLET ORAL
Qty: 30 TABLET | Refills: 5 | Status: SHIPPED | OUTPATIENT
Start: 2018-12-24 | End: 2019-06-15 | Stop reason: SDUPTHER

## 2019-02-11 DIAGNOSIS — E11.9 CONTROLLED TYPE 2 DIABETES MELLITUS WITHOUT COMPLICATION, WITHOUT LONG-TERM CURRENT USE OF INSULIN (HCC): ICD-10-CM

## 2019-02-11 RX ORDER — GLIPIZIDE 5 MG/1
TABLET, FILM COATED, EXTENDED RELEASE ORAL
Qty: 60 TABLET | Refills: 5 | Status: SHIPPED | OUTPATIENT
Start: 2019-02-11 | End: 2019-03-14 | Stop reason: SDUPTHER

## 2019-03-08 DIAGNOSIS — I10 ESSENTIAL HYPERTENSION: ICD-10-CM

## 2019-03-08 DIAGNOSIS — E11.9 CONTROLLED TYPE 2 DIABETES MELLITUS WITHOUT COMPLICATION, WITHOUT LONG-TERM CURRENT USE OF INSULIN (HCC): ICD-10-CM

## 2019-03-08 LAB
A/G RATIO: 2.5 (ref 1.1–2.2)
ALBUMIN SERPL-MCNC: 4.8 G/DL (ref 3.4–5)
ALP BLD-CCNC: 75 U/L (ref 40–129)
ALT SERPL-CCNC: 19 U/L (ref 10–40)
ANION GAP SERPL CALCULATED.3IONS-SCNC: 17 MMOL/L (ref 3–16)
AST SERPL-CCNC: 22 U/L (ref 15–37)
BASOPHILS ABSOLUTE: 0 K/UL (ref 0–0.2)
BASOPHILS RELATIVE PERCENT: 0.6 %
BILIRUB SERPL-MCNC: 0.7 MG/DL (ref 0–1)
BUN BLDV-MCNC: 34 MG/DL (ref 7–20)
CALCIUM SERPL-MCNC: 9.4 MG/DL (ref 8.3–10.6)
CHLORIDE BLD-SCNC: 98 MMOL/L (ref 99–110)
CHOLESTEROL, TOTAL: 139 MG/DL (ref 0–199)
CO2: 25 MMOL/L (ref 21–32)
CREAT SERPL-MCNC: 1.5 MG/DL (ref 0.8–1.3)
CREATININE URINE: 220.5 MG/DL (ref 39–259)
EOSINOPHILS ABSOLUTE: 0.1 K/UL (ref 0–0.6)
EOSINOPHILS RELATIVE PERCENT: 2.7 %
GFR AFRICAN AMERICAN: 55
GFR NON-AFRICAN AMERICAN: 45
GLOBULIN: 1.9 G/DL
GLUCOSE BLD-MCNC: 177 MG/DL (ref 70–99)
HCT VFR BLD CALC: 44.8 % (ref 40.5–52.5)
HDLC SERPL-MCNC: 51 MG/DL (ref 40–60)
HEMOGLOBIN: 14.9 G/DL (ref 13.5–17.5)
LDL CHOLESTEROL CALCULATED: 70 MG/DL
LYMPHOCYTES ABSOLUTE: 1.1 K/UL (ref 1–5.1)
LYMPHOCYTES RELATIVE PERCENT: 28.2 %
MCH RBC QN AUTO: 32.4 PG (ref 26–34)
MCHC RBC AUTO-ENTMCNC: 33.4 G/DL (ref 31–36)
MCV RBC AUTO: 97.1 FL (ref 80–100)
MICROALBUMIN UR-MCNC: 2.7 MG/DL
MICROALBUMIN/CREAT UR-RTO: 12.2 MG/G (ref 0–30)
MONOCYTES ABSOLUTE: 0.4 K/UL (ref 0–1.3)
MONOCYTES RELATIVE PERCENT: 9.6 %
NEUTROPHILS ABSOLUTE: 2.2 K/UL (ref 1.7–7.7)
NEUTROPHILS RELATIVE PERCENT: 58.9 %
PDW BLD-RTO: 13.1 % (ref 12.4–15.4)
PLATELET # BLD: 167 K/UL (ref 135–450)
PMV BLD AUTO: 10.6 FL (ref 5–10.5)
POTASSIUM SERPL-SCNC: 4.6 MMOL/L (ref 3.5–5.1)
RBC # BLD: 4.61 M/UL (ref 4.2–5.9)
SODIUM BLD-SCNC: 140 MMOL/L (ref 136–145)
TOTAL PROTEIN: 6.7 G/DL (ref 6.4–8.2)
TRIGL SERPL-MCNC: 92 MG/DL (ref 0–150)
VLDLC SERPL CALC-MCNC: 18 MG/DL
WBC # BLD: 3.7 K/UL (ref 4–11)

## 2019-03-09 LAB
ESTIMATED AVERAGE GLUCOSE: 185.8 MG/DL
HBA1C MFR BLD: 8.1 %

## 2019-03-12 ENCOUNTER — OFFICE VISIT (OUTPATIENT)
Dept: INTERNAL MEDICINE CLINIC | Age: 79
End: 2019-03-12
Payer: MEDICARE

## 2019-03-12 VITALS
OXYGEN SATURATION: 98 % | WEIGHT: 186 LBS | DIASTOLIC BLOOD PRESSURE: 70 MMHG | HEART RATE: 84 BPM | RESPIRATION RATE: 16 BRPM | SYSTOLIC BLOOD PRESSURE: 130 MMHG | BODY MASS INDEX: 27.47 KG/M2

## 2019-03-12 DIAGNOSIS — C18.9 ADENOCARCINOMA OF COLON (HCC): ICD-10-CM

## 2019-03-12 DIAGNOSIS — I10 ESSENTIAL HYPERTENSION: ICD-10-CM

## 2019-03-12 DIAGNOSIS — E11.9 CONTROLLED TYPE 2 DIABETES MELLITUS WITHOUT COMPLICATION, WITHOUT LONG-TERM CURRENT USE OF INSULIN (HCC): Primary | ICD-10-CM

## 2019-03-12 DIAGNOSIS — I51.81 TAKOTSUBO CARDIOMYOPATHY: ICD-10-CM

## 2019-03-12 PROCEDURE — 1036F TOBACCO NON-USER: CPT | Performed by: INTERNAL MEDICINE

## 2019-03-12 PROCEDURE — G8482 FLU IMMUNIZE ORDER/ADMIN: HCPCS | Performed by: INTERNAL MEDICINE

## 2019-03-12 PROCEDURE — 1123F ACP DISCUSS/DSCN MKR DOCD: CPT | Performed by: INTERNAL MEDICINE

## 2019-03-12 PROCEDURE — G8599 NO ASA/ANTIPLAT THER USE RNG: HCPCS | Performed by: INTERNAL MEDICINE

## 2019-03-12 PROCEDURE — G8417 CALC BMI ABV UP PARAM F/U: HCPCS | Performed by: INTERNAL MEDICINE

## 2019-03-12 PROCEDURE — G8427 DOCREV CUR MEDS BY ELIG CLIN: HCPCS | Performed by: INTERNAL MEDICINE

## 2019-03-12 PROCEDURE — 1101F PT FALLS ASSESS-DOCD LE1/YR: CPT | Performed by: INTERNAL MEDICINE

## 2019-03-12 PROCEDURE — 99214 OFFICE O/P EST MOD 30 MIN: CPT | Performed by: INTERNAL MEDICINE

## 2019-03-12 PROCEDURE — 4040F PNEUMOC VAC/ADMIN/RCVD: CPT | Performed by: INTERNAL MEDICINE

## 2019-03-12 ASSESSMENT — PATIENT HEALTH QUESTIONNAIRE - PHQ9
SUM OF ALL RESPONSES TO PHQ9 QUESTIONS 1 & 2: 0
SUM OF ALL RESPONSES TO PHQ QUESTIONS 1-9: 0
SUM OF ALL RESPONSES TO PHQ QUESTIONS 1-9: 0
1. LITTLE INTEREST OR PLEASURE IN DOING THINGS: 0
2. FEELING DOWN, DEPRESSED OR HOPELESS: 0

## 2019-03-14 DIAGNOSIS — E11.9 CONTROLLED TYPE 2 DIABETES MELLITUS WITHOUT COMPLICATION, WITHOUT LONG-TERM CURRENT USE OF INSULIN (HCC): ICD-10-CM

## 2019-03-14 RX ORDER — GLIPIZIDE 5 MG/1
TABLET, FILM COATED, EXTENDED RELEASE ORAL
Qty: 270 TABLET | Refills: 1 | Status: SHIPPED | OUTPATIENT
Start: 2019-03-14 | End: 2019-04-29 | Stop reason: SDUPTHER

## 2019-03-20 DIAGNOSIS — E11.9 CONTROLLED TYPE 2 DIABETES MELLITUS WITHOUT COMPLICATION, WITHOUT LONG-TERM CURRENT USE OF INSULIN (HCC): ICD-10-CM

## 2019-03-20 DIAGNOSIS — I10 ESSENTIAL HYPERTENSION: ICD-10-CM

## 2019-03-20 RX ORDER — LISINOPRIL 40 MG/1
40 TABLET ORAL DAILY
Qty: 90 TABLET | Refills: 1 | Status: SHIPPED | OUTPATIENT
Start: 2019-03-20 | End: 2019-04-29 | Stop reason: SDUPTHER

## 2019-04-29 DIAGNOSIS — E11.9 CONTROLLED TYPE 2 DIABETES MELLITUS WITHOUT COMPLICATION, WITHOUT LONG-TERM CURRENT USE OF INSULIN (HCC): ICD-10-CM

## 2019-04-29 DIAGNOSIS — I10 ESSENTIAL HYPERTENSION: ICD-10-CM

## 2019-04-29 RX ORDER — LISINOPRIL 40 MG/1
40 TABLET ORAL DAILY
Qty: 90 TABLET | Refills: 1 | Status: SHIPPED | OUTPATIENT
Start: 2019-04-29 | End: 2019-05-23 | Stop reason: SDUPTHER

## 2019-04-29 RX ORDER — GLIPIZIDE 5 MG/1
TABLET, FILM COATED, EXTENDED RELEASE ORAL
Qty: 270 TABLET | Refills: 1 | Status: SHIPPED | OUTPATIENT
Start: 2019-04-29 | End: 2019-07-23 | Stop reason: SDUPTHER

## 2019-05-19 DIAGNOSIS — I10 ESSENTIAL HYPERTENSION: ICD-10-CM

## 2019-05-20 DIAGNOSIS — E11.9 CONTROLLED TYPE 2 DIABETES MELLITUS WITHOUT COMPLICATION, WITHOUT LONG-TERM CURRENT USE OF INSULIN (HCC): ICD-10-CM

## 2019-05-20 RX ORDER — AMLODIPINE BESYLATE 5 MG/1
TABLET ORAL
Qty: 30 TABLET | Refills: 5 | Status: SHIPPED | OUTPATIENT
Start: 2019-05-20 | End: 2019-11-07 | Stop reason: SDUPTHER

## 2019-05-23 DIAGNOSIS — I10 ESSENTIAL HYPERTENSION: ICD-10-CM

## 2019-05-23 DIAGNOSIS — E11.9 CONTROLLED TYPE 2 DIABETES MELLITUS WITHOUT COMPLICATION, WITHOUT LONG-TERM CURRENT USE OF INSULIN (HCC): ICD-10-CM

## 2019-05-23 RX ORDER — LISINOPRIL 40 MG/1
40 TABLET ORAL DAILY
Qty: 90 TABLET | Refills: 1 | Status: SHIPPED | OUTPATIENT
Start: 2019-05-23 | End: 2020-02-18 | Stop reason: ALTCHOICE

## 2019-06-07 DIAGNOSIS — E11.9 CONTROLLED TYPE 2 DIABETES MELLITUS WITHOUT COMPLICATION, WITHOUT LONG-TERM CURRENT USE OF INSULIN (HCC): ICD-10-CM

## 2019-06-07 DIAGNOSIS — I10 ESSENTIAL HYPERTENSION: ICD-10-CM

## 2019-06-07 LAB
A/G RATIO: 2.1 (ref 1.1–2.2)
ALBUMIN SERPL-MCNC: 4.7 G/DL (ref 3.4–5)
ALP BLD-CCNC: 72 U/L (ref 40–129)
ALT SERPL-CCNC: 18 U/L (ref 10–40)
ANION GAP SERPL CALCULATED.3IONS-SCNC: 13 MMOL/L (ref 3–16)
AST SERPL-CCNC: 22 U/L (ref 15–37)
BILIRUB SERPL-MCNC: 0.6 MG/DL (ref 0–1)
BUN BLDV-MCNC: 26 MG/DL (ref 7–20)
CALCIUM SERPL-MCNC: 9.5 MG/DL (ref 8.3–10.6)
CHLORIDE BLD-SCNC: 101 MMOL/L (ref 99–110)
CHOLESTEROL, TOTAL: 152 MG/DL (ref 0–199)
CO2: 27 MMOL/L (ref 21–32)
CREAT SERPL-MCNC: 1.2 MG/DL (ref 0.8–1.3)
GFR AFRICAN AMERICAN: >60
GFR NON-AFRICAN AMERICAN: 58
GLOBULIN: 2.2 G/DL
GLUCOSE BLD-MCNC: 185 MG/DL (ref 70–99)
HDLC SERPL-MCNC: 46 MG/DL (ref 40–60)
LDL CHOLESTEROL CALCULATED: 79 MG/DL
POTASSIUM SERPL-SCNC: 4.6 MMOL/L (ref 3.5–5.1)
SODIUM BLD-SCNC: 141 MMOL/L (ref 136–145)
TOTAL PROTEIN: 6.9 G/DL (ref 6.4–8.2)
TRIGL SERPL-MCNC: 133 MG/DL (ref 0–150)
VLDLC SERPL CALC-MCNC: 27 MG/DL

## 2019-06-08 LAB
ESTIMATED AVERAGE GLUCOSE: 194.4 MG/DL
HBA1C MFR BLD: 8.4 %

## 2019-06-10 RX ORDER — PIOGLITAZONEHYDROCHLORIDE 15 MG/1
15 TABLET ORAL DAILY
Qty: 30 TABLET | Refills: 3 | Status: SHIPPED | OUTPATIENT
Start: 2019-06-10 | End: 2019-07-08 | Stop reason: SDUPTHER

## 2019-06-11 ENCOUNTER — OFFICE VISIT (OUTPATIENT)
Dept: INTERNAL MEDICINE CLINIC | Age: 79
End: 2019-06-11
Payer: MEDICARE

## 2019-06-11 VITALS
WEIGHT: 187.6 LBS | HEIGHT: 69 IN | BODY MASS INDEX: 27.78 KG/M2 | DIASTOLIC BLOOD PRESSURE: 84 MMHG | RESPIRATION RATE: 18 BRPM | SYSTOLIC BLOOD PRESSURE: 138 MMHG | HEART RATE: 54 BPM | OXYGEN SATURATION: 98 %

## 2019-06-11 DIAGNOSIS — H35.3210 EXUDATIVE AGE-RELATED MACULAR DEGENERATION OF RIGHT EYE, UNSPECIFIED STAGE (HCC): ICD-10-CM

## 2019-06-11 DIAGNOSIS — I10 ESSENTIAL HYPERTENSION: ICD-10-CM

## 2019-06-11 DIAGNOSIS — E78.2 MIXED HYPERLIPIDEMIA: ICD-10-CM

## 2019-06-11 DIAGNOSIS — I51.81 TAKOTSUBO CARDIOMYOPATHY: ICD-10-CM

## 2019-06-11 DIAGNOSIS — E11.9 CONTROLLED TYPE 2 DIABETES MELLITUS WITHOUT COMPLICATION, WITHOUT LONG-TERM CURRENT USE OF INSULIN (HCC): Primary | ICD-10-CM

## 2019-06-11 LAB — HBA1C MFR BLD: 7.6 %

## 2019-06-11 PROCEDURE — 83036 HEMOGLOBIN GLYCOSYLATED A1C: CPT | Performed by: INTERNAL MEDICINE

## 2019-06-11 PROCEDURE — 99214 OFFICE O/P EST MOD 30 MIN: CPT | Performed by: INTERNAL MEDICINE

## 2019-06-11 PROCEDURE — 1036F TOBACCO NON-USER: CPT | Performed by: INTERNAL MEDICINE

## 2019-06-11 PROCEDURE — 1123F ACP DISCUSS/DSCN MKR DOCD: CPT | Performed by: INTERNAL MEDICINE

## 2019-06-11 PROCEDURE — 4040F PNEUMOC VAC/ADMIN/RCVD: CPT | Performed by: INTERNAL MEDICINE

## 2019-06-11 PROCEDURE — G8417 CALC BMI ABV UP PARAM F/U: HCPCS | Performed by: INTERNAL MEDICINE

## 2019-06-11 PROCEDURE — G8599 NO ASA/ANTIPLAT THER USE RNG: HCPCS | Performed by: INTERNAL MEDICINE

## 2019-06-11 PROCEDURE — G8427 DOCREV CUR MEDS BY ELIG CLIN: HCPCS | Performed by: INTERNAL MEDICINE

## 2019-06-11 NOTE — PROGRESS NOTES
Malou Spain  1940 06/11/19    SUBJECTIVE:    DM- recent a1c higher to 8.4 fr 8.1 but at home ~110-130 and diet stable. HAS RETINOPATHY AND GETTING INTRAOCULAR INJ FR DR BEAULIEU-- RECHECK A1C TODAY BETTER AT 7.6. Lab Results   Component Value Date    LABA1C 8.4 06/07/2019    LABA1C 8.1 03/08/2019    LABA1C 7.6 12/11/2018     Lab Results   Component Value Date    LABMICR 2.70 (H) 03/08/2019    LDLCALC 79 06/07/2019    CREATININE 1.2 06/07/2019       Hypertension: Stable. Denies CP, SOB, cough, visual changes, dizziness, palpitations or HA. Lipids:  Is continuing statin therapy and low fat diet. Tolerating medications w/o myalgias or GI upset. TAKOTSUBO- DR REYNOLDS MONITORING AND CLINICALLY RESOLVED. OBJECTIVE:    /84   Pulse 54   Resp 18   Ht 5' 9\" (1.753 m)   Wt 187 lb 9.6 oz (85.1 kg)   SpO2 98%   BMI 27.70 kg/m²     Physical Exam   Constitutional: He appears well-developed and well-nourished. No distress. HENT:   Head: Normocephalic and atraumatic. Nose: Nose normal.   Mouth/Throat: Oropharynx is clear and moist. No oropharyngeal exudate. Eyes: Pupils are equal, round, and reactive to light. Conjunctivae and EOM are normal. Right eye exhibits no discharge. Left eye exhibits no discharge. No scleral icterus. Neck: Normal range of motion. Neck supple. No tracheal deviation present. Cardiovascular: Normal rate, regular rhythm, normal heart sounds and intact distal pulses. Exam reveals no gallop and no friction rub. No murmur heard. Pulmonary/Chest: Effort normal and breath sounds normal. No respiratory distress. He has no wheezes. He has no rales. Abdominal: Soft. Bowel sounds are normal. He exhibits no distension and no mass. There is no tenderness. There is no rebound and no guarding. Musculoskeletal: He exhibits no edema or tenderness. Lymphadenopathy:     He has no cervical adenopathy. Neurological: He is alert. He has normal reflexes.    Skin: Skin is

## 2019-06-13 DIAGNOSIS — E11.9 CONTROLLED TYPE 2 DIABETES MELLITUS WITHOUT COMPLICATION, WITHOUT LONG-TERM CURRENT USE OF INSULIN (HCC): ICD-10-CM

## 2019-06-15 DIAGNOSIS — I10 ESSENTIAL HYPERTENSION: ICD-10-CM

## 2019-06-17 RX ORDER — HYDROCHLOROTHIAZIDE 25 MG/1
TABLET ORAL
Qty: 30 TABLET | Refills: 5 | Status: SHIPPED | OUTPATIENT
Start: 2019-06-17 | End: 2019-12-05 | Stop reason: SDUPTHER

## 2019-07-05 DIAGNOSIS — E78.2 MIXED HYPERLIPIDEMIA: ICD-10-CM

## 2019-07-05 RX ORDER — ROSUVASTATIN CALCIUM 10 MG/1
TABLET, COATED ORAL
Qty: 30 TABLET | Refills: 5 | Status: SHIPPED | OUTPATIENT
Start: 2019-07-05 | End: 2020-06-16 | Stop reason: SDUPTHER

## 2019-07-08 RX ORDER — PIOGLITAZONEHYDROCHLORIDE 15 MG/1
15 TABLET ORAL DAILY
Qty: 30 TABLET | Refills: 3 | Status: SHIPPED | OUTPATIENT
Start: 2019-07-08 | End: 2020-02-18 | Stop reason: ALTCHOICE

## 2019-07-16 ENCOUNTER — OFFICE VISIT (OUTPATIENT)
Dept: CARDIOLOGY CLINIC | Age: 79
End: 2019-07-16
Payer: MEDICARE

## 2019-07-16 VITALS
SYSTOLIC BLOOD PRESSURE: 142 MMHG | BODY MASS INDEX: 27.4 KG/M2 | HEIGHT: 69 IN | HEART RATE: 75 BPM | DIASTOLIC BLOOD PRESSURE: 78 MMHG | WEIGHT: 185 LBS

## 2019-07-16 DIAGNOSIS — E78.2 MIXED HYPERLIPIDEMIA: ICD-10-CM

## 2019-07-16 DIAGNOSIS — I70.0 ATHEROSCLEROSIS OF AORTA (HCC): Primary | ICD-10-CM

## 2019-07-16 DIAGNOSIS — I51.81 TAKOTSUBO CARDIOMYOPATHY: ICD-10-CM

## 2019-07-16 DIAGNOSIS — I10 ESSENTIAL HYPERTENSION: ICD-10-CM

## 2019-07-16 DIAGNOSIS — E11.9 CONTROLLED TYPE 2 DIABETES MELLITUS WITHOUT COMPLICATION, WITHOUT LONG-TERM CURRENT USE OF INSULIN (HCC): ICD-10-CM

## 2019-07-16 PROCEDURE — 1123F ACP DISCUSS/DSCN MKR DOCD: CPT | Performed by: INTERNAL MEDICINE

## 2019-07-16 PROCEDURE — 4040F PNEUMOC VAC/ADMIN/RCVD: CPT | Performed by: INTERNAL MEDICINE

## 2019-07-16 PROCEDURE — G8417 CALC BMI ABV UP PARAM F/U: HCPCS | Performed by: INTERNAL MEDICINE

## 2019-07-16 PROCEDURE — 99213 OFFICE O/P EST LOW 20 MIN: CPT | Performed by: INTERNAL MEDICINE

## 2019-07-16 PROCEDURE — G8598 ASA/ANTIPLAT THER USED: HCPCS | Performed by: INTERNAL MEDICINE

## 2019-07-16 PROCEDURE — G8427 DOCREV CUR MEDS BY ELIG CLIN: HCPCS | Performed by: INTERNAL MEDICINE

## 2019-07-16 PROCEDURE — 1036F TOBACCO NON-USER: CPT | Performed by: INTERNAL MEDICINE

## 2019-07-16 RX ORDER — ASPIRIN 81 MG/1
81 TABLET ORAL DAILY
COMMUNITY

## 2019-07-16 NOTE — PROGRESS NOTES
· Gastrointestinal: No abdominal pain, appetite loss, blood in stools, constipation, diarrhea or heartburn  · Genitourinary: No dysuria, trouble voiding, or hematuria  · Musculoskeletal:  None  · Integumentary: No rash or pruritis  · Neurological: No TIA or stroke symptoms  · Psychiatric: No anxiety or depression  · Endocrine: No malaise, fatigue or temperature intolerance  · Hematologic/Lymphatic: No bleeding problems, blood clots or swollen lymph nodes  · Allergic/Immunologic: No nasal congestion or hives    Objective:      Physical Exam:  BP (!) 142/78 (Site: Left Upper Arm, Position: Sitting, Cuff Size: Medium Adult)   Pulse 75   Ht 5' 9\" (1.753 m)   Wt 185 lb (83.9 kg)   BMI 27.32 kg/m²   Wt Readings from Last 3 Encounters:   07/16/19 185 lb (83.9 kg)   06/11/19 187 lb 9.6 oz (85.1 kg)   03/12/19 186 lb (84.4 kg)     Body mass index is 27.32 kg/m². Vitals:    07/16/19 1011   BP: (!) 142/78   Pulse:         General Appearance:  No distress, conversant  Constitutional:  Well developed, Well nourished, No acute distress, Non-toxic appearance. HENT:  Normocephalic, Atraumatic, Bilateral external ears normal, Oropharynx moist, No oral exudates, Nose normal. Neck- Normal range of motion, No tenderness, Supple, No stridor,no apical-carotid delay  Eyes:  PERRL, EOMI, Conjunctiva normal, No discharge. Respiratory:  Normal breath sounds, No respiratory distress, No wheezing, No chest tenderness. ,no use of accessory muscles,   Cardiovascular: (PMI) apex non displaced,no lifts no thrills,S1 and S2 audible, No added heart sounds, No signs of ankle edema, or volume overload, No evidence of JVD  GI:  Bowel sounds normal, Soft, No tenderness, No masses, No gross visceromegaly   :  No costovertebral angle tenderness   Musculoskeletal:  No edema, no tenderness, no deformities.  Back- no tenderness  Integument:  Well hydrated, no rash   Lymphatic:  No lymphadenopathy noted   Neurologic:  Alert & oriented x 3, CN 2-12 Atherosclerosis of aorta ([facility]    2. Controlled type 2 diabetes mellitus without complication, without long-term current use of insulin ([facility]    3. Essential hypertension    4. Mixed hyperlipidemia    5. Takotsubo cardiomyopathy         Takotsubo cardiomyopathy  Echo in Dec 2017 showed EF  30 % takatsobo cardiomyopathy pattern . He was intolerant of Coreg and propranolol , But he is tolerating metoprolol and  lisinopril. He had a  Repeat echo in March 2018  in  shows improved LV function . Continue current meds     Essential hypertension  He says his blood pressures fairly well-controlled at [facility] but runs high here in office , advised to bring in a log of his bp meds     Atherosclerosis of aorta  Continue aspirin and statins. He thinks aspirin gives him Gi discomfort , I have urged him to take it. Try zantac to help with stomach reflux      Dyslipidemia :  Faridehmelodie Brooks had lab work recently,  Lipid profile was reviewed with patient. He takes a statin on alternate days He is on crestor 10 mg     Counseled extensively and medication compliance urged. We discussed that for the  prevention of ASCVD our  goal is aggressive risk modification. Patient is encouraged to exercise even a brisk walk for 30 minutes  at least 3 to 4 times a week   Various goals were discussed and questions answered. Continue current medications. Appropriate prescriptions are addressed and refills ordered. Questions answered and patient verbalizes understanding. Call for any problems, questions, or concerns. Continue all other medications of all above medical condition listed as is. Return in about 6 months (around 1/16/2020).

## 2019-07-23 DIAGNOSIS — E11.9 CONTROLLED TYPE 2 DIABETES MELLITUS WITHOUT COMPLICATION, WITHOUT LONG-TERM CURRENT USE OF INSULIN (HCC): ICD-10-CM

## 2019-07-23 RX ORDER — GLIPIZIDE 5 MG/1
TABLET, FILM COATED, EXTENDED RELEASE ORAL
Qty: 270 TABLET | Refills: 1 | Status: SHIPPED | OUTPATIENT
Start: 2019-07-23 | End: 2020-03-10 | Stop reason: SDUPTHER

## 2019-08-22 LAB — DIABETIC RETINOPATHY: NEGATIVE

## 2019-09-05 DIAGNOSIS — E11.9 CONTROLLED TYPE 2 DIABETES MELLITUS WITHOUT COMPLICATION, WITHOUT LONG-TERM CURRENT USE OF INSULIN (HCC): ICD-10-CM

## 2019-09-05 DIAGNOSIS — E78.2 MIXED HYPERLIPIDEMIA: ICD-10-CM

## 2019-09-05 LAB
A/G RATIO: 2.6 (ref 1.1–2.2)
ALBUMIN SERPL-MCNC: 4.7 G/DL (ref 3.4–5)
ALP BLD-CCNC: 63 U/L (ref 40–129)
ALT SERPL-CCNC: 17 U/L (ref 10–40)
ANION GAP SERPL CALCULATED.3IONS-SCNC: 15 MMOL/L (ref 3–16)
AST SERPL-CCNC: 21 U/L (ref 15–37)
BILIRUB SERPL-MCNC: 0.3 MG/DL (ref 0–1)
BUN BLDV-MCNC: 38 MG/DL (ref 7–20)
CALCIUM SERPL-MCNC: 9.7 MG/DL (ref 8.3–10.6)
CHLORIDE BLD-SCNC: 102 MMOL/L (ref 99–110)
CHOLESTEROL, TOTAL: 144 MG/DL (ref 0–199)
CO2: 27 MMOL/L (ref 21–32)
CREAT SERPL-MCNC: 1.3 MG/DL (ref 0.8–1.3)
GFR AFRICAN AMERICAN: >60
GFR NON-AFRICAN AMERICAN: 53
GLOBULIN: 1.8 G/DL
GLUCOSE BLD-MCNC: 145 MG/DL (ref 70–99)
HDLC SERPL-MCNC: 50 MG/DL (ref 40–60)
LDL CHOLESTEROL CALCULATED: 73 MG/DL
POTASSIUM SERPL-SCNC: 4.3 MMOL/L (ref 3.5–5.1)
SODIUM BLD-SCNC: 144 MMOL/L (ref 136–145)
TOTAL PROTEIN: 6.5 G/DL (ref 6.4–8.2)
TRIGL SERPL-MCNC: 104 MG/DL (ref 0–150)
VLDLC SERPL CALC-MCNC: 21 MG/DL

## 2019-09-06 LAB
ESTIMATED AVERAGE GLUCOSE: 165.7 MG/DL
HBA1C MFR BLD: 7.4 %

## 2019-09-10 ENCOUNTER — OFFICE VISIT (OUTPATIENT)
Dept: INTERNAL MEDICINE CLINIC | Age: 79
End: 2019-09-10
Payer: MEDICARE

## 2019-09-10 VITALS
OXYGEN SATURATION: 98 % | WEIGHT: 185 LBS | SYSTOLIC BLOOD PRESSURE: 135 MMHG | DIASTOLIC BLOOD PRESSURE: 79 MMHG | HEART RATE: 84 BPM | HEIGHT: 68 IN | RESPIRATION RATE: 16 BRPM | BODY MASS INDEX: 28.04 KG/M2

## 2019-09-10 DIAGNOSIS — E11.9 CONTROLLED TYPE 2 DIABETES MELLITUS WITHOUT COMPLICATION, WITHOUT LONG-TERM CURRENT USE OF INSULIN (HCC): ICD-10-CM

## 2019-09-10 DIAGNOSIS — I51.81 TAKOTSUBO CARDIOMYOPATHY: ICD-10-CM

## 2019-09-10 DIAGNOSIS — Z23 NEED FOR INFLUENZA VACCINATION: ICD-10-CM

## 2019-09-10 DIAGNOSIS — Z00.00 ROUTINE GENERAL MEDICAL EXAMINATION AT A HEALTH CARE FACILITY: Primary | ICD-10-CM

## 2019-09-10 DIAGNOSIS — C18.9 ADENOCARCINOMA OF COLON (HCC): ICD-10-CM

## 2019-09-10 DIAGNOSIS — I10 ESSENTIAL HYPERTENSION: ICD-10-CM

## 2019-09-10 PROCEDURE — 90662 IIV NO PRSV INCREASED AG IM: CPT | Performed by: INTERNAL MEDICINE

## 2019-09-10 PROCEDURE — G8598 ASA/ANTIPLAT THER USED: HCPCS | Performed by: INTERNAL MEDICINE

## 2019-09-10 PROCEDURE — 1123F ACP DISCUSS/DSCN MKR DOCD: CPT | Performed by: INTERNAL MEDICINE

## 2019-09-10 PROCEDURE — 4040F PNEUMOC VAC/ADMIN/RCVD: CPT | Performed by: INTERNAL MEDICINE

## 2019-09-10 PROCEDURE — G0439 PPPS, SUBSEQ VISIT: HCPCS | Performed by: INTERNAL MEDICINE

## 2019-09-10 PROCEDURE — G0008 ADMIN INFLUENZA VIRUS VAC: HCPCS | Performed by: INTERNAL MEDICINE

## 2019-09-10 ASSESSMENT — LIFESTYLE VARIABLES: HOW OFTEN DO YOU HAVE A DRINK CONTAINING ALCOHOL: 0

## 2019-09-10 ASSESSMENT — PATIENT HEALTH QUESTIONNAIRE - PHQ9
SUM OF ALL RESPONSES TO PHQ QUESTIONS 1-9: 0
SUM OF ALL RESPONSES TO PHQ QUESTIONS 1-9: 0

## 2019-09-10 NOTE — PROGRESS NOTES
Yes Steven Bourgeois MD   lisinopril (PRINIVIL;ZESTRIL) 40 MG tablet Take 1 tablet by mouth daily Take one daily Yes Steven Bourgeois MD   amLODIPine (NORVASC) 5 MG tablet TAKE 1 TABLET BY MOUTH DAILY Yes Steven Bourgeois MD   metFORMIN (GLUCOPHAGE) 500 MG tablet TAKE 1 TABLET BY MOUTH TWICE A DAY Yes Steven Bourgeois MD   ranitidine (ZANTAC) 150 MG tablet Take 1 tablet by mouth nightly Yes Akanksha Parker MD   glucose blood VI test strips (EXACTECH TEST) strip Test once day or as directed by doctor. Dx 250.00  ( Accu-Check Compact test strips) Yes Steven Bourgeois MD   hydrocortisone 2.5 % cream Apply topically 2 times daily. Yes Steven Bourgeois MD   fluticasone [facility]) 50 MCG/ACT nasal spray 2 sprays by Nasal route daily  Johanny Cheney APRN - CNP     Past Medical History:   Diagnosis Date    Adenocarcinoma of colon [facility]) 2005    Dr Chiquita Guzmán and beltran Zavala scope 3/9/17-- recheck 3 yrs    Age-related macular degeneration, wet, right eye ([address] 02/2017    Dr Priti Contreras starting shots    Anxiety     Atherosclerosis of aorta ([facility] [address]     noted on CXR 2012- no aneurysm on u/s- 3/2017    Depression     DM II (diabetes mellitus, type II), controlled ([address]     Family history of coronary artery disease     History of cardiac cath 12/09/2017    Mid LAD has 20% stenosis. Start beta blockers and ace     History of echocardiogram 12/09/2017    EF 30-35%     Hx of Doppler echocardiogram 03/06/2018    Limited-EF50-60%,normal    Hyperlipidemia     Hypertension     Onychomycosis     Tinnitus      Past Surgical History:   Procedure Laterality Date    CARDIAC CATHETERIZATION  12/09/2017    Mid LAD has 20% stenosis.     CHOLECYSTECTOMY, LAPAROSCOPIC  8/10    Dr. Kahlil Cronin  11/05    Low anterior rectosigmoid colectomy Dr. Chiquita Guzmán     Family History   Problem Relation Age of Onset    Heart Disease Father        CareTeam (Including outside providers/suppliers regularly involved in providing care):   Patient Care Team:  Hilary Mazariegos MD as PCP - Erika Call MD as PCP - [facility]    Wt Readings from Last 3 Encounters:   09/10/19 185 lb (83.9 kg)   07/16/19 185 lb (83.9 kg)   06/11/19 187 lb 9.6 oz (85.1 kg)     Vitals:    09/10/19 1020   BP: 135/79   Pulse: 84   Resp: 16   SpO2: 98%   Weight: 185 lb (83.9 kg)   Height: 5' 8\" (1.727 m)     Body mass index is 28.13 kg/m². Based upon direct observation of the patient, evaluation of cognition reveals recent and remote memory intact. General Appearance: alert and oriented to person, place and time, well developed and well- nourished, in no acute distress  Skin: warm and dry, no rash or erythema  Head: normocephalic and atraumatic  Eyes: pupils equal, round, and reactive to light, extraocular eye movements intact, conjunctivae normal  ENT:   nose without deformity, nasal mucosa and turbinates normal without polyps  Neck: supple and non-tender without mass, no thyromegaly or thyroid nodules, no cervical lymphadenopathy  Pulmonary/Chest: clear to auscultation bilaterally- no wheezes, rales or rhonchi, normal air movement, no respiratory distress  Cardiovascular: normal rate, regular rhythm, normal S1 and S2, no murmurs, rubs, clicks, or gallops, distal pulses intact, no carotid bruits  Abdomen: soft, non-tender, non-distended, normal bowel sounds, no masses or organomegaly  Extremities: no cyanosis, clubbing or edema  Musculoskeletal: normal range of motion, no joint swelling, deformity or tenderness  Neurologic:  , no cranial nerve deficit, gait, coordination and speech normal    Patient's complete Health Risk Assessment and screening values have been reviewed and are found in Flowsheets. The following problems were reviewed today and where indicated follow up appointments were made and/or referrals ordered.     Positive Risk Factor Screenings with Interventions:     Depression:  Depression Unable to Assess: Functional

## 2019-11-22 ENCOUNTER — OFFICE VISIT (OUTPATIENT)
Dept: INTERNAL MEDICINE CLINIC | Age: 79
End: 2019-11-22
Payer: MEDICARE

## 2019-11-22 VITALS
RESPIRATION RATE: 17 BRPM | BODY MASS INDEX: 27.67 KG/M2 | OXYGEN SATURATION: 98 % | WEIGHT: 182 LBS | HEART RATE: 64 BPM | SYSTOLIC BLOOD PRESSURE: 128 MMHG | DIASTOLIC BLOOD PRESSURE: 70 MMHG

## 2019-11-22 DIAGNOSIS — J01.00 ACUTE NON-RECURRENT MAXILLARY SINUSITIS: Primary | ICD-10-CM

## 2019-11-22 DIAGNOSIS — I10 ESSENTIAL HYPERTENSION: ICD-10-CM

## 2019-11-22 PROCEDURE — 99213 OFFICE O/P EST LOW 20 MIN: CPT | Performed by: INTERNAL MEDICINE

## 2019-11-22 PROCEDURE — 1123F ACP DISCUSS/DSCN MKR DOCD: CPT | Performed by: INTERNAL MEDICINE

## 2019-11-22 PROCEDURE — G8482 FLU IMMUNIZE ORDER/ADMIN: HCPCS | Performed by: INTERNAL MEDICINE

## 2019-11-22 PROCEDURE — G8427 DOCREV CUR MEDS BY ELIG CLIN: HCPCS | Performed by: INTERNAL MEDICINE

## 2019-11-22 PROCEDURE — G8598 ASA/ANTIPLAT THER USED: HCPCS | Performed by: INTERNAL MEDICINE

## 2019-11-22 PROCEDURE — 1036F TOBACCO NON-USER: CPT | Performed by: INTERNAL MEDICINE

## 2019-11-22 PROCEDURE — 4040F PNEUMOC VAC/ADMIN/RCVD: CPT | Performed by: INTERNAL MEDICINE

## 2019-11-22 PROCEDURE — G8417 CALC BMI ABV UP PARAM F/U: HCPCS | Performed by: INTERNAL MEDICINE

## 2019-11-22 RX ORDER — AZITHROMYCIN 250 MG/1
250 TABLET, FILM COATED ORAL SEE ADMIN INSTRUCTIONS
Qty: 6 TABLET | Refills: 0 | Status: SHIPPED | OUTPATIENT
Start: 2019-11-22 | End: 2019-11-26 | Stop reason: SDUPTHER

## 2019-11-22 RX ORDER — AMLODIPINE BESYLATE 5 MG/1
5 TABLET ORAL DAILY
Qty: 30 TABLET | Refills: 5 | Status: SHIPPED | OUTPATIENT
Start: 2019-11-22 | End: 2020-03-10 | Stop reason: SDUPTHER

## 2019-11-22 RX ORDER — PREDNISONE 1 MG/1
TABLET ORAL
Qty: 21 TABLET | Refills: 0 | Status: SHIPPED | OUTPATIENT
Start: 2019-11-22 | End: 2019-11-26 | Stop reason: SDUPTHER

## 2019-11-26 ENCOUNTER — TELEPHONE (OUTPATIENT)
Dept: INTERNAL MEDICINE CLINIC | Age: 79
End: 2019-11-26

## 2019-11-26 DIAGNOSIS — J01.00 ACUTE NON-RECURRENT MAXILLARY SINUSITIS: ICD-10-CM

## 2019-11-26 RX ORDER — AZITHROMYCIN 250 MG/1
250 TABLET, FILM COATED ORAL SEE ADMIN INSTRUCTIONS
Qty: 6 TABLET | Refills: 0 | Status: SHIPPED | OUTPATIENT
Start: 2019-11-26 | End: 2019-12-01

## 2019-11-26 RX ORDER — PREDNISONE 1 MG/1
TABLET ORAL
Qty: 21 TABLET | Refills: 0 | Status: SHIPPED | OUTPATIENT
Start: 2019-11-26 | End: 2019-12-10

## 2019-12-06 DIAGNOSIS — E11.9 CONTROLLED TYPE 2 DIABETES MELLITUS WITHOUT COMPLICATION, WITHOUT LONG-TERM CURRENT USE OF INSULIN (HCC): Primary | ICD-10-CM

## 2019-12-06 DIAGNOSIS — E11.9 CONTROLLED TYPE 2 DIABETES MELLITUS WITHOUT COMPLICATION, WITHOUT LONG-TERM CURRENT USE OF INSULIN (HCC): ICD-10-CM

## 2019-12-06 DIAGNOSIS — I51.81 TAKOTSUBO CARDIOMYOPATHY: ICD-10-CM

## 2019-12-06 DIAGNOSIS — I10 ESSENTIAL HYPERTENSION: ICD-10-CM

## 2019-12-06 LAB
A/G RATIO: 2.3 (ref 1.1–2.2)
ALBUMIN SERPL-MCNC: 4.3 G/DL (ref 3.4–5)
ALP BLD-CCNC: 63 U/L (ref 40–129)
ALT SERPL-CCNC: 16 U/L (ref 10–40)
ANION GAP SERPL CALCULATED.3IONS-SCNC: 14 MMOL/L (ref 3–16)
AST SERPL-CCNC: 18 U/L (ref 15–37)
BASOPHILS ABSOLUTE: 0 K/UL (ref 0–0.2)
BASOPHILS RELATIVE PERCENT: 0.6 %
BILIRUB SERPL-MCNC: 0.6 MG/DL (ref 0–1)
BUN BLDV-MCNC: 38 MG/DL (ref 7–20)
CALCIUM SERPL-MCNC: 9.2 MG/DL (ref 8.3–10.6)
CHLORIDE BLD-SCNC: 101 MMOL/L (ref 99–110)
CHOLESTEROL, TOTAL: 149 MG/DL (ref 0–199)
CO2: 27 MMOL/L (ref 21–32)
CREAT SERPL-MCNC: 1.7 MG/DL (ref 0.8–1.3)
EOSINOPHILS ABSOLUTE: 0.1 K/UL (ref 0–0.6)
EOSINOPHILS RELATIVE PERCENT: 3.7 %
GFR AFRICAN AMERICAN: 47
GFR NON-AFRICAN AMERICAN: 39
GLOBULIN: 1.9 G/DL
GLUCOSE BLD-MCNC: 186 MG/DL (ref 70–99)
HCT VFR BLD CALC: 41.6 % (ref 40.5–52.5)
HDLC SERPL-MCNC: 48 MG/DL (ref 40–60)
HEMOGLOBIN: 14.2 G/DL (ref 13.5–17.5)
LDL CHOLESTEROL CALCULATED: 79 MG/DL
LYMPHOCYTES ABSOLUTE: 1.1 K/UL (ref 1–5.1)
LYMPHOCYTES RELATIVE PERCENT: 29.2 %
MCH RBC QN AUTO: 33.1 PG (ref 26–34)
MCHC RBC AUTO-ENTMCNC: 34.1 G/DL (ref 31–36)
MCV RBC AUTO: 96.9 FL (ref 80–100)
MONOCYTES ABSOLUTE: 0.4 K/UL (ref 0–1.3)
MONOCYTES RELATIVE PERCENT: 10.4 %
NEUTROPHILS ABSOLUTE: 2.1 K/UL (ref 1.7–7.7)
NEUTROPHILS RELATIVE PERCENT: 56.1 %
PDW BLD-RTO: 13.5 % (ref 12.4–15.4)
PLATELET # BLD: 160 K/UL (ref 135–450)
PMV BLD AUTO: 10.8 FL (ref 5–10.5)
POTASSIUM SERPL-SCNC: 4.3 MMOL/L (ref 3.5–5.1)
RBC # BLD: 4.29 M/UL (ref 4.2–5.9)
SODIUM BLD-SCNC: 142 MMOL/L (ref 136–145)
TOTAL PROTEIN: 6.2 G/DL (ref 6.4–8.2)
TRIGL SERPL-MCNC: 111 MG/DL (ref 0–150)
VLDLC SERPL CALC-MCNC: 22 MG/DL
WBC # BLD: 3.7 K/UL (ref 4–11)

## 2019-12-06 PROCEDURE — 36415 COLL VENOUS BLD VENIPUNCTURE: CPT | Performed by: INTERNAL MEDICINE

## 2019-12-07 LAB
ESTIMATED AVERAGE GLUCOSE: 177.2 MG/DL
HBA1C MFR BLD: 7.8 %

## 2019-12-09 DIAGNOSIS — I10 ESSENTIAL HYPERTENSION: Primary | ICD-10-CM

## 2019-12-10 ENCOUNTER — OFFICE VISIT (OUTPATIENT)
Dept: INTERNAL MEDICINE CLINIC | Age: 79
End: 2019-12-10
Payer: MEDICARE

## 2019-12-10 VITALS
RESPIRATION RATE: 16 BRPM | SYSTOLIC BLOOD PRESSURE: 127 MMHG | HEART RATE: 59 BPM | BODY MASS INDEX: 27.83 KG/M2 | OXYGEN SATURATION: 97 % | DIASTOLIC BLOOD PRESSURE: 67 MMHG | WEIGHT: 183 LBS

## 2019-12-10 DIAGNOSIS — C18.9 ADENOCARCINOMA OF COLON (HCC): ICD-10-CM

## 2019-12-10 DIAGNOSIS — I10 ESSENTIAL HYPERTENSION: Primary | ICD-10-CM

## 2019-12-10 DIAGNOSIS — E78.2 MIXED HYPERLIPIDEMIA: ICD-10-CM

## 2019-12-10 DIAGNOSIS — N28.9 ACUTE RENAL INSUFFICIENCY: ICD-10-CM

## 2019-12-10 DIAGNOSIS — E11.9 CONTROLLED TYPE 2 DIABETES MELLITUS WITHOUT COMPLICATION, WITHOUT LONG-TERM CURRENT USE OF INSULIN (HCC): ICD-10-CM

## 2019-12-10 PROCEDURE — 1123F ACP DISCUSS/DSCN MKR DOCD: CPT | Performed by: INTERNAL MEDICINE

## 2019-12-10 PROCEDURE — 4040F PNEUMOC VAC/ADMIN/RCVD: CPT | Performed by: INTERNAL MEDICINE

## 2019-12-10 PROCEDURE — G8427 DOCREV CUR MEDS BY ELIG CLIN: HCPCS | Performed by: INTERNAL MEDICINE

## 2019-12-10 PROCEDURE — G8482 FLU IMMUNIZE ORDER/ADMIN: HCPCS | Performed by: INTERNAL MEDICINE

## 2019-12-10 PROCEDURE — 1036F TOBACCO NON-USER: CPT | Performed by: INTERNAL MEDICINE

## 2019-12-10 PROCEDURE — 99214 OFFICE O/P EST MOD 30 MIN: CPT | Performed by: INTERNAL MEDICINE

## 2019-12-10 PROCEDURE — G8598 ASA/ANTIPLAT THER USED: HCPCS | Performed by: INTERNAL MEDICINE

## 2019-12-10 PROCEDURE — G8417 CALC BMI ABV UP PARAM F/U: HCPCS | Performed by: INTERNAL MEDICINE

## 2019-12-10 RX ORDER — HYDROCHLOROTHIAZIDE 25 MG/1
12.5 TABLET ORAL DAILY
Qty: 30 TABLET | Refills: 3 | Status: SHIPPED | OUTPATIENT
Start: 2019-12-10 | End: 2020-03-10 | Stop reason: SDUPTHER

## 2019-12-16 DIAGNOSIS — I10 ESSENTIAL HYPERTENSION: ICD-10-CM

## 2019-12-16 DIAGNOSIS — E11.9 CONTROLLED TYPE 2 DIABETES MELLITUS WITHOUT COMPLICATION, WITHOUT LONG-TERM CURRENT USE OF INSULIN (HCC): ICD-10-CM

## 2019-12-16 RX ORDER — LISINOPRIL 40 MG/1
TABLET ORAL
Qty: 90 TABLET | Refills: 1 | Status: SHIPPED | OUTPATIENT
Start: 2019-12-16 | End: 2020-02-18 | Stop reason: ALTCHOICE

## 2020-01-23 LAB
ANION GAP SERPL CALCULATED.3IONS-SCNC: 14 MMOL/L (ref 3–16)
BUN BLDV-MCNC: 35 MG/DL (ref 7–20)
CALCIUM SERPL-MCNC: 9.5 MG/DL (ref 8.3–10.6)
CHLORIDE BLD-SCNC: 95 MMOL/L (ref 99–110)
CO2: 28 MMOL/L (ref 21–32)
CREAT SERPL-MCNC: 1.5 MG/DL (ref 0.8–1.3)
GFR AFRICAN AMERICAN: 55
GFR NON-AFRICAN AMERICAN: 45
GLUCOSE BLD-MCNC: 202 MG/DL (ref 70–99)
POTASSIUM SERPL-SCNC: 4.9 MMOL/L (ref 3.5–5.1)
SODIUM BLD-SCNC: 137 MMOL/L (ref 136–145)

## 2020-01-23 PROCEDURE — 36415 COLL VENOUS BLD VENIPUNCTURE: CPT | Performed by: INTERNAL MEDICINE

## 2020-01-24 NOTE — RESULT ENCOUNTER NOTE
Call pt, labs INDICATE KIDNEY FXN AND HYDRATION ARE BETTER ON LOWER DOSE WATER PILL. CONT CURRENT DOSES.   SUGARS SL HIGH   LAST TIME SO I DO REC A LITTLE MORE WORK W DM DIET

## 2020-02-18 ENCOUNTER — OFFICE VISIT (OUTPATIENT)
Dept: CARDIOLOGY CLINIC | Age: 80
End: 2020-02-18
Payer: MEDICARE

## 2020-02-18 VITALS
WEIGHT: 183 LBS | HEART RATE: 76 BPM | BODY MASS INDEX: 27.74 KG/M2 | DIASTOLIC BLOOD PRESSURE: 62 MMHG | SYSTOLIC BLOOD PRESSURE: 120 MMHG | HEIGHT: 68 IN

## 2020-02-18 PROCEDURE — G8427 DOCREV CUR MEDS BY ELIG CLIN: HCPCS | Performed by: INTERNAL MEDICINE

## 2020-02-18 PROCEDURE — G8482 FLU IMMUNIZE ORDER/ADMIN: HCPCS | Performed by: INTERNAL MEDICINE

## 2020-02-18 PROCEDURE — G8417 CALC BMI ABV UP PARAM F/U: HCPCS | Performed by: INTERNAL MEDICINE

## 2020-02-18 PROCEDURE — 99214 OFFICE O/P EST MOD 30 MIN: CPT | Performed by: INTERNAL MEDICINE

## 2020-02-18 PROCEDURE — 1036F TOBACCO NON-USER: CPT | Performed by: INTERNAL MEDICINE

## 2020-02-18 PROCEDURE — 4040F PNEUMOC VAC/ADMIN/RCVD: CPT | Performed by: INTERNAL MEDICINE

## 2020-02-18 PROCEDURE — 1123F ACP DISCUSS/DSCN MKR DOCD: CPT | Performed by: INTERNAL MEDICINE

## 2020-02-18 PROCEDURE — 93000 ELECTROCARDIOGRAM COMPLETE: CPT | Performed by: INTERNAL MEDICINE

## 2020-02-18 NOTE — LETTER
CLINICAL STAFF DOCUMENTATION    Calleen Sumanth  1940  [id number]    Have you had any Chest Pain - No      Have you had any Shortness of Breath - No      Have you had any dizziness - No      Have you had any palpitations - No        Do you have a surgery or procedure scheduled in the near future - No    Ask patient if they want to sign up for MyChart if they are not already signed up    Check to see if we have an E-MAIL on file for the patient    Check medication list thoroughly!!!  BE SURE TO ASK PATIENT IF THEY NEED MEDICATION REFILLS
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
## 2020-03-05 LAB
A/G RATIO: 2.5 (ref 1.1–2.2)
ALBUMIN SERPL-MCNC: 4.5 G/DL (ref 3.4–5)
ALP BLD-CCNC: 74 U/L (ref 40–129)
ALT SERPL-CCNC: 15 U/L (ref 10–40)
ANION GAP SERPL CALCULATED.3IONS-SCNC: 13 MMOL/L (ref 3–16)
AST SERPL-CCNC: 20 U/L (ref 15–37)
BASOPHILS ABSOLUTE: 0 K/UL (ref 0–0.2)
BASOPHILS RELATIVE PERCENT: 0.8 %
BILIRUB SERPL-MCNC: 0.6 MG/DL (ref 0–1)
BUN BLDV-MCNC: 26 MG/DL (ref 7–20)
CALCIUM SERPL-MCNC: 9.4 MG/DL (ref 8.3–10.6)
CHLORIDE BLD-SCNC: 103 MMOL/L (ref 99–110)
CHOLESTEROL, TOTAL: 163 MG/DL (ref 0–199)
CO2: 27 MMOL/L (ref 21–32)
CREAT SERPL-MCNC: 1.3 MG/DL (ref 0.8–1.3)
EOSINOPHILS ABSOLUTE: 0.2 K/UL (ref 0–0.6)
EOSINOPHILS RELATIVE PERCENT: 5.1 %
GFR AFRICAN AMERICAN: >60
GFR NON-AFRICAN AMERICAN: 53
GLOBULIN: 1.8 G/DL
GLUCOSE BLD-MCNC: 199 MG/DL (ref 70–99)
HCT VFR BLD CALC: 42.2 % (ref 40.5–52.5)
HDLC SERPL-MCNC: 47 MG/DL (ref 40–60)
HEMOGLOBIN: 14.3 G/DL (ref 13.5–17.5)
LDL CHOLESTEROL CALCULATED: 94 MG/DL
LYMPHOCYTES ABSOLUTE: 1 K/UL (ref 1–5.1)
LYMPHOCYTES RELATIVE PERCENT: 32 %
MCH RBC QN AUTO: 32.4 PG (ref 26–34)
MCHC RBC AUTO-ENTMCNC: 33.8 G/DL (ref 31–36)
MCV RBC AUTO: 95.9 FL (ref 80–100)
MONOCYTES ABSOLUTE: 0.3 K/UL (ref 0–1.3)
MONOCYTES RELATIVE PERCENT: 10.2 %
NEUTROPHILS ABSOLUTE: 1.7 K/UL (ref 1.7–7.7)
NEUTROPHILS RELATIVE PERCENT: 51.9 %
PDW BLD-RTO: 13.4 % (ref 12.4–15.4)
PLATELET # BLD: 183 K/UL (ref 135–450)
PMV BLD AUTO: 10.3 FL (ref 5–10.5)
POTASSIUM SERPL-SCNC: 4.4 MMOL/L (ref 3.5–5.1)
RBC # BLD: 4.4 M/UL (ref 4.2–5.9)
SODIUM BLD-SCNC: 143 MMOL/L (ref 136–145)
TOTAL PROTEIN: 6.3 G/DL (ref 6.4–8.2)
TRIGL SERPL-MCNC: 108 MG/DL (ref 0–150)
VLDLC SERPL CALC-MCNC: 22 MG/DL
WBC # BLD: 3.2 K/UL (ref 4–11)

## 2020-03-06 LAB
ESTIMATED AVERAGE GLUCOSE: 188.6 MG/DL
HBA1C MFR BLD: 8.2 %

## 2020-03-10 ENCOUNTER — OFFICE VISIT (OUTPATIENT)
Dept: INTERNAL MEDICINE CLINIC | Age: 80
End: 2020-03-10
Payer: MEDICARE

## 2020-03-10 VITALS
DIASTOLIC BLOOD PRESSURE: 72 MMHG | SYSTOLIC BLOOD PRESSURE: 122 MMHG | RESPIRATION RATE: 14 BRPM | BODY MASS INDEX: 27.67 KG/M2 | OXYGEN SATURATION: 94 % | WEIGHT: 182 LBS | HEART RATE: 104 BPM

## 2020-03-10 PROCEDURE — G8417 CALC BMI ABV UP PARAM F/U: HCPCS | Performed by: INTERNAL MEDICINE

## 2020-03-10 PROCEDURE — 99214 OFFICE O/P EST MOD 30 MIN: CPT | Performed by: INTERNAL MEDICINE

## 2020-03-10 PROCEDURE — 3052F HG A1C>EQUAL 8.0%<EQUAL 9.0%: CPT | Performed by: INTERNAL MEDICINE

## 2020-03-10 PROCEDURE — 4040F PNEUMOC VAC/ADMIN/RCVD: CPT | Performed by: INTERNAL MEDICINE

## 2020-03-10 PROCEDURE — G8482 FLU IMMUNIZE ORDER/ADMIN: HCPCS | Performed by: INTERNAL MEDICINE

## 2020-03-10 PROCEDURE — 1123F ACP DISCUSS/DSCN MKR DOCD: CPT | Performed by: INTERNAL MEDICINE

## 2020-03-10 PROCEDURE — G8427 DOCREV CUR MEDS BY ELIG CLIN: HCPCS | Performed by: INTERNAL MEDICINE

## 2020-03-10 PROCEDURE — 1036F TOBACCO NON-USER: CPT | Performed by: INTERNAL MEDICINE

## 2020-03-10 RX ORDER — HYDROCHLOROTHIAZIDE 25 MG/1
12.5 TABLET ORAL DAILY
Qty: 30 TABLET | Refills: 5 | Status: SHIPPED | OUTPATIENT
Start: 2020-03-10 | End: 2020-06-16 | Stop reason: SDUPTHER

## 2020-03-10 RX ORDER — AMLODIPINE BESYLATE 5 MG/1
5 TABLET ORAL DAILY
Qty: 30 TABLET | Refills: 5 | Status: SHIPPED | OUTPATIENT
Start: 2020-03-10 | End: 2020-09-14

## 2020-03-10 RX ORDER — GLIPIZIDE 5 MG/1
TABLET, FILM COATED, EXTENDED RELEASE ORAL
Qty: 270 TABLET | Refills: 1 | Status: SHIPPED | OUTPATIENT
Start: 2020-03-10 | End: 2020-10-02

## 2020-03-10 ASSESSMENT — PATIENT HEALTH QUESTIONNAIRE - PHQ9
1. LITTLE INTEREST OR PLEASURE IN DOING THINGS: 0
SUM OF ALL RESPONSES TO PHQ QUESTIONS 1-9: 0
2. FEELING DOWN, DEPRESSED OR HOPELESS: 0
SUM OF ALL RESPONSES TO PHQ9 QUESTIONS 1 & 2: 0
SUM OF ALL RESPONSES TO PHQ QUESTIONS 1-9: 0

## 2020-03-10 NOTE — PROGRESS NOTES
Jessica Corcoran  1940  03/10/20    SUBJECTIVE:    Dm- RECENT A1C HIGHER 7.8--8.2, BUT BEEN LAX W DIET AND STATES WILL WORK HARDER. HAS HAD SOME LOOSE BUT TOLERABLE STOOLS ON METFORMIN. HE ESPEC WILL TRY TO CUT DOWN ON EVENING/NIGHTTIME SNACKING. Lab Results   Component Value Date    LABA1C 8.2 03/05/2020    LABA1C 7.8 12/06/2019    LABA1C 7.4 09/05/2019     Lab Results   Component Value Date    LABMICR 2.70 (H) 03/08/2019    LDLCALC 94 03/05/2020    CREATININE 1.3 03/05/2020     Hypertension: Stable. Denies CP, SOB, cough, visual changes, dizziness, palpitations or HA.      takotsubo CMP, monitoring w Dr Ana Lilia Mandel, EKG ok in his office. No sx cp or sob, palpitations. PACs noted. Hx of colon CA, due this yr for f/u colonoscopy w Dr Lynch Ny:    /72   Pulse 104   Resp 14   Wt 182 lb (82.6 kg)   SpO2 94%   BMI 27.67 kg/m²     Physical Exam  Vitals signs reviewed. Constitutional:       General: He is not in acute distress. Appearance: He is well-developed. HENT:      Head: Normocephalic and atraumatic. Nose: Nose normal.      Mouth/Throat:      Mouth: Mucous membranes are moist.      Pharynx: Oropharynx is clear. No oropharyngeal exudate. Eyes:      General: No scleral icterus. Right eye: No discharge. Left eye: No discharge. Conjunctiva/sclera: Conjunctivae normal.      Pupils: Pupils are equal, round, and reactive to light. Neck:      Musculoskeletal: Normal range of motion and neck supple. Thyroid: No thyromegaly. Vascular: No JVD. Trachea: No tracheal deviation. Cardiovascular:      Rate and Rhythm: Normal rate and regular rhythm. Heart sounds: Normal heart sounds. No murmur. No friction rub. No gallop. Pulmonary:      Effort: Pulmonary effort is normal. No respiratory distress. Breath sounds: Normal breath sounds. No wheezing or rales. Abdominal:      General: Bowel sounds are normal. There is no distension. Palpations: Abdomen is soft. There is no mass. Tenderness: There is no abdominal tenderness. There is no guarding or rebound. Musculoskeletal:         General: No tenderness. Lymphadenopathy:      Cervical: No cervical adenopathy. Skin:     General: Skin is warm and dry. Neurological:      Mental Status: He is alert. Psychiatric:         Mood and Affect: Mood normal.         ASSESSMENT:    1. Controlled type 2 diabetes mellitus without complication, without long-term current use of insulin ([address]    2. Essential hypertension    3. Takotsubo cardiomyopathy    4. Adenocarcinoma of colon [facility])        PLAN:    Keo Zuniga was seen today for diabetes and diarrhea. Diagnoses and all orders for this visit:    Controlled type 2 diabetes mellitus without complication, without long-term current use of insulin ([facility])  -     glipiZIDE (GLUCOTROL XL) 5 MG extended release tablet; TAKE 10 MG IN AM AND THEN 5 MG TABLET BY MOUTH AT BEDTIME  -     Comprehensive Metabolic Panel; Future  -     CBC Auto Differential; Future  -     Lipid Panel; Future  -     Hemoglobin A1C; Future    Essential hypertension - Blood pressure stable and will continue current regimen. Will plan periodic monitoring of renal function, electrolytes, lipid profile. -     amLODIPine (NORVASC) 5 MG tablet; Take 1 tablet by mouth daily  -     metoprolol tartrate (LOPRESSOR) 25 MG tablet; Take 0.5 tablets by mouth 2 times daily  -     hydroCHLOROthiazide (HYDRODIURIL) 25 MG tablet; Take 0.5 tablets by mouth daily  -     Comprehensive Metabolic Panel; Future  -     CBC Auto Differential; Future  -     Lipid Panel; Future    Takotsubo cardiomyopathy- Pt clinically w/o evidence of cardiovascular instability, cont regimen. -     metoprolol tartrate (LOPRESSOR) 25 MG tablet;  Take 0.5 tablets by mouth 2 times daily    Adenocarcinoma of colon [facility]) - referring for colonoscopy due this yr  -     External Referral To General Surgery

## 2020-03-10 NOTE — PATIENT INSTRUCTIONS
For improving sugars and wt loss, please try to work on diet and expec to stop snacking after dinner.

## 2020-03-30 RX ORDER — LISINOPRIL 40 MG/1
TABLET ORAL
Qty: 90 TABLET | Refills: 1 | Status: SHIPPED | OUTPATIENT
Start: 2020-03-30 | End: 2020-06-16 | Stop reason: SDUPTHER

## 2020-06-10 LAB
A/G RATIO: 2.5 (ref 1.1–2.2)
ALBUMIN SERPL-MCNC: 4.5 G/DL (ref 3.4–5)
ALP BLD-CCNC: 79 U/L (ref 40–129)
ALT SERPL-CCNC: 16 U/L (ref 10–40)
ANION GAP SERPL CALCULATED.3IONS-SCNC: 12 MMOL/L (ref 3–16)
AST SERPL-CCNC: 21 U/L (ref 15–37)
BASOPHILS ABSOLUTE: 0 K/UL (ref 0–0.2)
BASOPHILS RELATIVE PERCENT: 1.1 %
BILIRUB SERPL-MCNC: 0.5 MG/DL (ref 0–1)
BUN BLDV-MCNC: 24 MG/DL (ref 7–20)
CALCIUM SERPL-MCNC: 9.1 MG/DL (ref 8.3–10.6)
CHLORIDE BLD-SCNC: 103 MMOL/L (ref 99–110)
CHOLESTEROL, TOTAL: 153 MG/DL (ref 0–199)
CO2: 26 MMOL/L (ref 21–32)
CREAT SERPL-MCNC: 1.2 MG/DL (ref 0.8–1.3)
EOSINOPHILS ABSOLUTE: 0.2 K/UL (ref 0–0.6)
EOSINOPHILS RELATIVE PERCENT: 4.2 %
GFR AFRICAN AMERICAN: >60
GFR NON-AFRICAN AMERICAN: 58
GLOBULIN: 1.8 G/DL
GLUCOSE BLD-MCNC: 160 MG/DL (ref 70–99)
HCT VFR BLD CALC: 43.8 % (ref 40.5–52.5)
HDLC SERPL-MCNC: 42 MG/DL (ref 40–60)
HEMOGLOBIN: 14.6 G/DL (ref 13.5–17.5)
LDL CHOLESTEROL CALCULATED: 87 MG/DL
LYMPHOCYTES ABSOLUTE: 1.2 K/UL (ref 1–5.1)
LYMPHOCYTES RELATIVE PERCENT: 33.9 %
MCH RBC QN AUTO: 31.7 PG (ref 26–34)
MCHC RBC AUTO-ENTMCNC: 33.3 G/DL (ref 31–36)
MCV RBC AUTO: 95.4 FL (ref 80–100)
MONOCYTES ABSOLUTE: 0.4 K/UL (ref 0–1.3)
MONOCYTES RELATIVE PERCENT: 10.8 %
NEUTROPHILS ABSOLUTE: 1.8 K/UL (ref 1.7–7.7)
NEUTROPHILS RELATIVE PERCENT: 50 %
PDW BLD-RTO: 13.9 % (ref 12.4–15.4)
PLATELET # BLD: 179 K/UL (ref 135–450)
PMV BLD AUTO: 10.5 FL (ref 5–10.5)
POTASSIUM SERPL-SCNC: 4.3 MMOL/L (ref 3.5–5.1)
RBC # BLD: 4.59 M/UL (ref 4.2–5.9)
SODIUM BLD-SCNC: 141 MMOL/L (ref 136–145)
TOTAL PROTEIN: 6.3 G/DL (ref 6.4–8.2)
TRIGL SERPL-MCNC: 119 MG/DL (ref 0–150)
VLDLC SERPL CALC-MCNC: 24 MG/DL
WBC # BLD: 3.6 K/UL (ref 4–11)

## 2020-06-10 PROCEDURE — 36415 COLL VENOUS BLD VENIPUNCTURE: CPT | Performed by: INTERNAL MEDICINE

## 2020-06-11 LAB
ESTIMATED AVERAGE GLUCOSE: 185.8 MG/DL
HBA1C MFR BLD: 8.1 %

## 2020-06-16 ENCOUNTER — OFFICE VISIT (OUTPATIENT)
Dept: INTERNAL MEDICINE CLINIC | Age: 80
End: 2020-06-16
Payer: MEDICARE

## 2020-06-16 VITALS
BODY MASS INDEX: 27.83 KG/M2 | DIASTOLIC BLOOD PRESSURE: 84 MMHG | OXYGEN SATURATION: 97 % | RESPIRATION RATE: 16 BRPM | HEART RATE: 61 BPM | WEIGHT: 183 LBS | SYSTOLIC BLOOD PRESSURE: 144 MMHG

## 2020-06-16 PROCEDURE — 99214 OFFICE O/P EST MOD 30 MIN: CPT | Performed by: INTERNAL MEDICINE

## 2020-06-16 PROCEDURE — 1036F TOBACCO NON-USER: CPT | Performed by: INTERNAL MEDICINE

## 2020-06-16 PROCEDURE — G8427 DOCREV CUR MEDS BY ELIG CLIN: HCPCS | Performed by: INTERNAL MEDICINE

## 2020-06-16 PROCEDURE — 4040F PNEUMOC VAC/ADMIN/RCVD: CPT | Performed by: INTERNAL MEDICINE

## 2020-06-16 PROCEDURE — 3052F HG A1C>EQUAL 8.0%<EQUAL 9.0%: CPT | Performed by: INTERNAL MEDICINE

## 2020-06-16 PROCEDURE — G8417 CALC BMI ABV UP PARAM F/U: HCPCS | Performed by: INTERNAL MEDICINE

## 2020-06-16 PROCEDURE — 1123F ACP DISCUSS/DSCN MKR DOCD: CPT | Performed by: INTERNAL MEDICINE

## 2020-06-16 RX ORDER — HYDROCHLOROTHIAZIDE 25 MG/1
12.5 TABLET ORAL DAILY
Qty: 45 TABLET | Refills: 1 | Status: SHIPPED | OUTPATIENT
Start: 2020-06-16 | End: 2021-01-18

## 2020-06-16 RX ORDER — BLOOD SUGAR DIAGNOSTIC
STRIP MISCELLANEOUS
Qty: 50 EACH | Refills: 11 | Status: SHIPPED | OUTPATIENT
Start: 2020-06-16 | End: 2020-06-18 | Stop reason: ALTCHOICE

## 2020-06-16 RX ORDER — ROSUVASTATIN CALCIUM 10 MG/1
10 TABLET, COATED ORAL DAILY
Qty: 90 TABLET | Refills: 1 | Status: SHIPPED | OUTPATIENT
Start: 2020-06-16 | End: 2022-04-19 | Stop reason: SDUPTHER

## 2020-06-16 RX ORDER — LISINOPRIL 40 MG/1
40 TABLET ORAL DAILY
Qty: 90 TABLET | Refills: 1 | Status: SHIPPED | OUTPATIENT
Start: 2020-06-16 | End: 2021-02-23

## 2020-06-16 NOTE — PROGRESS NOTES
Lara Beverly  1940 06/16/20    SUBJECTIVE:    Hypertension: Stable- SL HIGH, DOES APPEAR TO HAVE LABILE/WHITE COAT HTN AS IS HIGHER IN OFFICE. Denies CP, SOB, cough, visual changes, dizziness, palpitations or HA. Dm-  AT HOME SUGARS 120-140, A1C REMAINS SL HIGH. SOMETIMES DIET SLIPS ON COOKIES AND DOUGHNUTS. Lab Results   Component Value Date    LABA1C 8.1 06/10/2020    LABA1C 8.2 03/05/2020    LABA1C 7.8 12/06/2019     Lab Results   Component Value Date    LABMICR 2.70 (H) 03/08/2019    LDLCALC 87 06/10/2020    CREATININE 1.2 06/10/2020     TAKOTSUBO CARDIOMYOPATHY, NO SX CP OR SOB, JASMINE. Lipids:  Is continuing statin therapy and low fat diet. Tolerating medications w/o myalgias or GI upset. OBJECTIVE:    BP (!) 164/74   Pulse 61   Resp 16   Wt 183 lb (83 kg)   SpO2 97%   BMI 27.83 kg/m²     Physical Exam  Vitals signs reviewed. Constitutional:       General: He is not in acute distress. Appearance: He is well-developed. HENT:      Head: Normocephalic and atraumatic. Nose: Nose normal.      Mouth/Throat:      Mouth: Mucous membranes are moist.      Pharynx: Oropharynx is clear. No oropharyngeal exudate. Eyes:      General: No scleral icterus. Right eye: No discharge. Left eye: No discharge. Conjunctiva/sclera: Conjunctivae normal.      Pupils: Pupils are equal, round, and reactive to light. Neck:      Musculoskeletal: Normal range of motion and neck supple. Thyroid: No thyromegaly. Vascular: No JVD. Trachea: No tracheal deviation. Cardiovascular:      Rate and Rhythm: Normal rate and regular rhythm. Heart sounds: Normal heart sounds. No murmur. No friction rub. No gallop. Pulmonary:      Effort: Pulmonary effort is normal. No respiratory distress. Breath sounds: Normal breath sounds. No wheezing or rales. Abdominal:      General: Bowel sounds are normal. There is no distension.       Palpations: Abdomen is soft. There is no mass. Tenderness: There is no abdominal tenderness. There is no guarding or rebound. Lymphadenopathy:      Cervical: No cervical adenopathy. Skin:     General: Skin is warm and dry. Findings: No rash. Neurological:      Mental Status: He is alert. Psychiatric:         Mood and Affect: Mood normal.         ASSESSMENT:    1. Controlled type 2 diabetes mellitus without complication, without long-term current use of insulin ([address]    2. Essential hypertension    3. Takotsubo cardiomyopathy    4. Mixed hyperlipidemia        PLAN:    Alayna Salinas was seen today for diabetes. Diagnoses and all orders for this visit:    Controlled type 2 diabetes mellitus without complication, without long-term current use of insulin ([address] - DM relatively well controlled and will continue current regimen. Screening reviewed. See orders. TO TRY INT FASTING AT HOME FOR DIET AND WT LOSS  -     lisinopril (PRINIVIL;ZESTRIL) 40 MG tablet; Take 1 tablet by mouth daily  -     blood glucose test strips (EXACTECH TEST) strip; Test once day or as directed by doctor. Dx 250.00  ( Accu-Check Compact test strips)  -     Comprehensive Metabolic Panel; Future  -     CBC Auto Differential; Future  -     Lipid Panel; Future  -     Hemoglobin A1C; Future  -     Microalbumin / Creatinine Urine Ratio; Future    Essential hypertension - Blood pressure stable and will continue current regimen. Will plan periodic monitoring of renal function, electrolytes, lipid profile. **FOR CONCERN OF WHITE COAT HTN, HE WILL BRING HIS MONITOR TO NEXT APPT TO TITRATE- TO BE SURE IS ACCURATE AT HOME**  -     metoprolol tartrate (LOPRESSOR) 25 MG tablet; Take 0.5 tablets by mouth 2 times daily  -     lisinopril (PRINIVIL;ZESTRIL) 40 MG tablet; Take 1 tablet by mouth daily  -     hydroCHLOROthiazide (HYDRODIURIL) 25 MG tablet; Take 0.5 tablets by mouth daily  -     Comprehensive Metabolic Panel;  Future  -     CBC Auto Differential; Future  - Lipid Panel; Future    Takotsubo cardiomyopathy- Pt clinically w/o evidence of cardiovascular instability, cont regimen. CONT F/U DR REYNOLDS  -     metoprolol tartrate (LOPRESSOR) 25 MG tablet; Take 0.5 tablets by mouth 2 times daily  -     Comprehensive Metabolic Panel; Future  -     CBC Auto Differential; Future    Mixed hyperlipidemia - Pt will continue to work on a low fat diet and also exercise, wt loss as appropriate. Will continue periodic monitoring of fasting lipid profile, glucose, liver function. -     rosuvastatin (CRESTOR) 10 MG tablet; Take 1 tablet by mouth daily  -     Comprehensive Metabolic Panel; Future  -     CBC Auto Differential; Future  -     Lipid Panel;  Future

## 2020-07-31 ENCOUNTER — OFFICE VISIT (OUTPATIENT)
Dept: INTERNAL MEDICINE CLINIC | Age: 80
End: 2020-07-31
Payer: MEDICARE

## 2020-07-31 VITALS
BODY MASS INDEX: 27.67 KG/M2 | DIASTOLIC BLOOD PRESSURE: 80 MMHG | SYSTOLIC BLOOD PRESSURE: 158 MMHG | OXYGEN SATURATION: 97 % | HEART RATE: 75 BPM | WEIGHT: 182 LBS

## 2020-07-31 PROCEDURE — 1036F TOBACCO NON-USER: CPT | Performed by: INTERNAL MEDICINE

## 2020-07-31 PROCEDURE — G8427 DOCREV CUR MEDS BY ELIG CLIN: HCPCS | Performed by: INTERNAL MEDICINE

## 2020-07-31 PROCEDURE — 99213 OFFICE O/P EST LOW 20 MIN: CPT | Performed by: INTERNAL MEDICINE

## 2020-07-31 PROCEDURE — 4040F PNEUMOC VAC/ADMIN/RCVD: CPT | Performed by: INTERNAL MEDICINE

## 2020-07-31 PROCEDURE — G8417 CALC BMI ABV UP PARAM F/U: HCPCS | Performed by: INTERNAL MEDICINE

## 2020-07-31 PROCEDURE — 1123F ACP DISCUSS/DSCN MKR DOCD: CPT | Performed by: INTERNAL MEDICINE

## 2020-07-31 NOTE — PATIENT INSTRUCTIONS
FOR YOUR BP INCREASE METOPROLOL FROM 1/2 TO 1 TAB TWICE/DAY.   [address] BP MON OR TUE

## 2020-08-04 ENCOUNTER — TELEPHONE (OUTPATIENT)
Dept: INTERNAL MEDICINE CLINIC | Age: 80
End: 2020-08-04

## 2020-08-04 RX ORDER — ATENOLOL 25 MG/1
75 TABLET ORAL DAILY
Qty: 90 TABLET | Refills: 5 | Status: SHIPPED | OUTPATIENT
Start: 2020-08-04 | End: 2020-09-14

## 2020-08-04 NOTE — TELEPHONE ENCOUNTER
rec we switch metoprolol to atenolol 75mg nightly, can prescribe 25mg tabs, 3 at night. Ok to also start this tonight as the metoprolol will be wearing off after the morning dose. Then is to call us back w his BP end of the week    Need f/u appt w him in one month.   Place med changes/corrections on EPIC medlist please

## 2020-08-04 NOTE — TELEPHONE ENCOUNTER
Pt states he had another \"hot flash attack\" this morning around 1 am. He cannot sleep, he has really bad night sweats, he was afraid to take his BP at that time. His BP is still running high at night/early morning 166/84 was this morning when he woke up. HR was 67. His BP is good throughout the day 110/70 but always rises at night. Pt states he cannot continue to take the medication if it causes insomnia and these sweat attacks. Please advise.

## 2020-09-14 ENCOUNTER — OFFICE VISIT (OUTPATIENT)
Dept: INTERNAL MEDICINE CLINIC | Age: 80
End: 2020-09-14
Payer: MEDICARE

## 2020-09-14 VITALS
OXYGEN SATURATION: 95 % | WEIGHT: 181 LBS | SYSTOLIC BLOOD PRESSURE: 148 MMHG | DIASTOLIC BLOOD PRESSURE: 94 MMHG | HEIGHT: 68 IN | BODY MASS INDEX: 27.43 KG/M2 | RESPIRATION RATE: 16 BRPM | HEART RATE: 80 BPM

## 2020-09-14 PROCEDURE — G0439 PPPS, SUBSEQ VISIT: HCPCS | Performed by: INTERNAL MEDICINE

## 2020-09-14 PROCEDURE — 4040F PNEUMOC VAC/ADMIN/RCVD: CPT | Performed by: INTERNAL MEDICINE

## 2020-09-14 PROCEDURE — 3052F HG A1C>EQUAL 8.0%<EQUAL 9.0%: CPT | Performed by: INTERNAL MEDICINE

## 2020-09-14 PROCEDURE — 1123F ACP DISCUSS/DSCN MKR DOCD: CPT | Performed by: INTERNAL MEDICINE

## 2020-09-14 PROCEDURE — 90694 VACC AIIV4 NO PRSRV 0.5ML IM: CPT | Performed by: INTERNAL MEDICINE

## 2020-09-14 PROCEDURE — G0008 ADMIN INFLUENZA VIRUS VAC: HCPCS | Performed by: INTERNAL MEDICINE

## 2020-09-14 RX ORDER — HYDRALAZINE HYDROCHLORIDE 10 MG/1
10 TABLET, FILM COATED ORAL 2 TIMES DAILY PRN
Qty: 60 TABLET | Refills: 3 | Status: SHIPPED | OUTPATIENT
Start: 2020-09-14 | End: 2022-04-19 | Stop reason: SDUPTHER

## 2020-09-14 RX ORDER — AMLODIPINE BESYLATE 5 MG/1
5 TABLET ORAL DAILY
Qty: 30 TABLET | Refills: 5
Start: 2020-09-14 | End: 2020-12-18

## 2020-09-14 ASSESSMENT — PATIENT HEALTH QUESTIONNAIRE - PHQ9
SUM OF ALL RESPONSES TO PHQ QUESTIONS 1-9: 0
1. LITTLE INTEREST OR PLEASURE IN DOING THINGS: 0
SUM OF ALL RESPONSES TO PHQ QUESTIONS 1-9: 0
SUM OF ALL RESPONSES TO PHQ9 QUESTIONS 1 & 2: 0
2. FEELING DOWN, DEPRESSED OR HOPELESS: 0

## 2020-09-14 ASSESSMENT — LIFESTYLE VARIABLES: HOW OFTEN DO YOU HAVE A DRINK CONTAINING ALCOHOL: 0

## 2020-09-14 NOTE — PATIENT INSTRUCTIONS
YOU CAN TRY HYDRALAZINE AS NEEDED FOR SYSTOLIC BP >524, UP TO TWICE/DAY. ALSO TAKE 1 HR PRIOR TO PHYSICIAN MEDICAL APPPOINTMENTS INCL YOUR APPT W US IN 6 WEEKS. Personalized Preventive Plan for Tabitha Marie - 9/14/2020  Medicare offers a range of preventive health benefits. Some of the tests and screenings are paid in full while other may be subject to a deductible, co-insurance, and/or copay. Some of these benefits include a comprehensive review of your medical history including lifestyle, illnesses that may run in your family, and various assessments and screenings as appropriate. After reviewing your medical record and screening and assessments performed today your provider may have ordered immunizations, labs, imaging, and/or referrals for you. A list of these orders (if applicable) as well as your Preventive Care list are included within your After Visit Summary for your review. Other Preventive Recommendations:    · A preventive eye exam performed by an eye specialist is recommended every 1-2 years to screen for glaucoma; cataracts, macular degeneration, and other eye disorders. · A preventive dental visit is recommended every 6 months. · Try to get at least 150 minutes of exercise per week or 10,000 steps per day on a pedometer . · Order or download the FREE \"Exercise & Physical Activity: Your Everyday Guide\" from [employer]. Call 4-108.271.9633 or search [employer] online. · You need 0269-4985 mg of calcium and 0058-4772 IU of vitamin D per day. It is possible to meet your calcium requirement with diet alone, but a vitamin D supplement is usually necessary to meet this goal.  · When exposed to the sun, use a sunscreen that protects against both UVA and UVB radiation with an SPF of 30 or greater. Reapply every 2 to 3 hours or after sweating, drying off with a towel, or swimming. · Always wear a seat belt when traveling in a car.  Always wear a helmet when riding a bicycle or motorcycle.

## 2020-09-14 NOTE — PROGRESS NOTES
Medicare Annual Wellness Visit  Name: Adriana Houser Date: 2020   MRN: [id number] Sex: Male   Age: 78 y.o. Ethnicity: Non-/Non    : 1940 Race: Lorena Jara is here for Medicare AWV    Screenings for behavioral, psychosocial and functional/safety risks, and cognitive dysfunction are all negative except as indicated below. These results, as well as other patient data from the [address] form, are documented in Flowsheets linked to this Encounter. DID NOT MELISSA LOPRESSOR WHICH CAUSED POOR SLEEP AND INCR ANXIETY, FEELS BETTER OFF THIS BUT BP HIGH. RECHECK BP BY ME L ARM /90.  [address] ~120-130S/70-80S. MAY HAVE A VERY STRONG COMPONENT OF WHITE COAT SYNDROME. DM- SUGARS RELATIVELY STABLE, WILL PLAN F/U LAB PRIOR TO NEXT APPT. Lab Results   Component Value Date    LABA1C 8.1 06/10/2020    LABA1C 8.2 2020    LABA1C 7.8 2019     Lab Results   Component Value Date    LABMICR 2.70 (H) 2019    LDLCALC 87 06/10/2020    CREATININE 1.2 06/10/2020             Allergies   Allergen Reactions    Atenolol Other (See Comments)     DIZZINESS, SWEATY- INCR ANXIETY AND INSOMNIA    Crestor [Rosuvastatin Calcium]      Cannot take everyday upsets stomach    Lovastatin      Can not tolerate 10 mg but can tolerate 5 mg       Prior to Visit Medications    Medication Sig Taking?  Authorizing Provider   ONETOUCH ULTRA strip TEST 3 TIMES A DAY Yes Claudean Patrick, MD   rosuvastatin (CRESTOR) 10 MG tablet Take 1 tablet by mouth daily Yes Claudean Patrick, MD   lisinopril (PRINIVIL;ZESTRIL) 40 MG tablet Take 1 tablet by mouth daily Yes Claudean Patrick, MD   hydroCHLOROthiazide (HYDRODIURIL) 25 MG tablet Take 0.5 tablets by mouth daily Yes Claudean Patrick, MD   amLODIPine (NORVASC) 5 MG tablet Take 1 tablet by mouth daily Yes Claudean Patrick, MD   glipiZIDE (GLUCOTROL XL) 5 MG extended release tablet TAKE 10 MG IN AM AND THEN 5 MG TABLET BY MOUTH AT BEDTIME Yes Bee Aguilar MD   metFORMIN (GLUCOPHAGE) 500 MG tablet TAKE 1 TABLET BY MOUTH TWICE A DAY Yes Bee Aguilar MD   aspirin EC 81 MG EC tablet Take 81 mg by mouth daily Yes Historical Provider, MD   fluticasone (FLONASE) 50 MCG/ACT nasal spray 2 sprays by Nasal route daily Yes Jodee Gary, APRN - CNP       Past Medical History:   Diagnosis Date    Adenocarcinoma of colon [facility]) 2005    Dr Aryan Blackwell and beltran Nunez scope 3/9/17-- recheck 3 yrs    Age-related macular degeneration, wet, right eye ([facility] 02/2017    Dr Maurilio Fisher starting shots    Anxiety     Atherosclerosis of aorta ([facility]     noted on CXR 2012- no aneurysm on u/s- 3/2017    Depression     DM II (diabetes mellitus, type II), controlled ([facility]     Family history of coronary artery disease     History of cardiac cath 12/09/2017    Mid LAD has 20% stenosis. Start beta blockers and ace     History of echocardiogram 12/09/2017    EF 30-35%     Hx of Doppler echocardiogram 03/06/2018    Limited-EF50-60%,normal    Hyperlipidemia     Hypertension     Onychomycosis     Tinnitus        Past Surgical History:   Procedure Laterality Date    CARDIAC CATHETERIZATION  12/09/2017    Mid LAD has 20% stenosis.     CHOLECYSTECTOMY, LAPAROSCOPIC  8/10    Dr. Jewel Beasley  11/05    Low anterior rectosigmoid colectomy Dr. Aryan Blackwell       Family History   Problem Relation Age of Onset    Heart Disease Father        CareTeam (Including outside providers/suppliers regularly involved in providing care):   Patient Care Team:  Bee Aguilar MD as PCP - Ondina James MD as PCP - [facility] Empaneled Provider    Wt Readings from Last 3 Encounters:   09/14/20 181 lb (82.1 kg)   07/31/20 182 lb (82.6 kg)   06/16/20 183 lb (83 kg)     Vitals:    09/14/20 1045   BP: (!) 150/98   Pulse: 80   Resp: 16   SpO2: 95%   Weight: 181 lb (82.1 kg)   Height: 5' 8\" (1.727 m)     Body mass index is 27.52  Influenza, High Dose (Fluzone 65 yrs and older) 11/12/2014, 11/17/2015, 11/15/2016, 08/14/2017, 08/21/2018, 09/10/2019    Influenza, Quadv, adjuvanted, 65 yrs +, IM, PF (Fluad) 09/14/2020    Pneumococcal Conjugate 13-valent ([id number]) 02/14/2017    Pneumococcal Polysaccharide ([id number]) 05/15/2018        Health Maintenance   Topic Date Due    DTaP/Tdap/Td vaccine (1 - Tdap) 11/28/1959    Shingles Vaccine (1 of 2) 11/28/1990    Annual Wellness Visit (AWV)  05/29/2019    Diabetic foot exam  09/10/2020    Diabetic retinal exam  08/22/2020    A1C test (Diabetic or Prediabetic)  12/10/2020    Lipid screen  06/10/2021    Potassium monitoring  06/10/2021    Creatinine monitoring  06/10/2021    Flu vaccine  Completed    Pneumococcal 65+ years Vaccine  Completed    Hepatitis A vaccine  Aged Out    Hib vaccine  Aged Out    Meningococcal (ACWY) vaccine  Aged Out     Recommendations for [employer] Due: see orders and patient instructions/AVS.  . Recommended screening schedule for the next 5-10 years is provided to the patient in written form: see Patient Arpit Ramirez was seen today for medicare awv. Diagnoses and all orders for this visit:    Routine general medical examination at a health care facility  - remains independent, functional and active, no indications/needs for other interventions noted at this time- except as noted below and also findings noted on screening medicare questions. Controlled type 2 diabetes mellitus without complication, without long-term current use of insulin ([facility]  - . PHTN    -     Comprehensive Metabolic Panel; Future  -     CBC Auto Differential; Future  -     Lipid Panel; Future  -     Hemoglobin A1C; Future    Essential hypertension- STRONG COMPONENT OF LABILE/WHITE COAT HTN. ADD PRN HYDRALAZINE, ESPEC WILL TRY TAKING 1 HR PRIOR TO OFFICE APPTS HERE. -     amLODIPine (NORVASC) 5 MG tablet;  Take 1 tablet by mouth daily  - hydrALAZINE (APRESOLINE) 10 MG tablet; Take 1 tablet by mouth 2 times daily as needed (TAKE FOR SYSTOLIC BP >902 AND ALSO CAN TAKE 1 HOUR PRIOR TO ANY PHYSICIAN APPOINTMENTS.)  -     Comprehensive Metabolic Panel; Future  -     CBC Auto Differential; Future  -     Lipid Panel; Future    Mixed hyperlipidemia  - Pt will continue to work on a low fat diet and also exercise, wt loss as appropriate. Will continue periodic monitoring of fasting lipid profile, glucose, liver function. -     Comprehensive Metabolic Panel; Future  -     Lipid Panel; Future  -     TSH without Reflex;  Future    Need for influenza vaccination  -     INFLUENZA, QUADV, ADJUVANTED, 65 YRS =, IM, PF, PREFILL SYR, 0.5ML (FLUAD)

## 2020-10-02 RX ORDER — GLIPIZIDE 5 MG/1
TABLET, FILM COATED, EXTENDED RELEASE ORAL
Qty: 270 TABLET | Refills: 1 | Status: SHIPPED | OUTPATIENT
Start: 2020-10-02 | End: 2021-07-28

## 2020-10-22 LAB
A/G RATIO: 2.8 (ref 1.1–2.2)
ALBUMIN SERPL-MCNC: 4.7 G/DL (ref 3.4–5)
ALP BLD-CCNC: 81 U/L (ref 40–129)
ALT SERPL-CCNC: 15 U/L (ref 10–40)
ANION GAP SERPL CALCULATED.3IONS-SCNC: 13 MMOL/L (ref 3–16)
AST SERPL-CCNC: 18 U/L (ref 15–37)
BASOPHILS ABSOLUTE: 0 K/UL (ref 0–0.2)
BASOPHILS RELATIVE PERCENT: 0.5 %
BILIRUB SERPL-MCNC: 0.7 MG/DL (ref 0–1)
BUN BLDV-MCNC: 30 MG/DL (ref 7–20)
CALCIUM SERPL-MCNC: 9.7 MG/DL (ref 8.3–10.6)
CHLORIDE BLD-SCNC: 102 MMOL/L (ref 99–110)
CHOLESTEROL, TOTAL: 158 MG/DL (ref 0–199)
CO2: 27 MMOL/L (ref 21–32)
CREAT SERPL-MCNC: 1.2 MG/DL (ref 0.8–1.3)
EOSINOPHILS ABSOLUTE: 0.2 K/UL (ref 0–0.6)
EOSINOPHILS RELATIVE PERCENT: 5.5 %
GFR AFRICAN AMERICAN: >60
GFR NON-AFRICAN AMERICAN: 58
GLOBULIN: 1.7 G/DL
GLUCOSE BLD-MCNC: 186 MG/DL (ref 70–99)
HCT VFR BLD CALC: 46.9 % (ref 40.5–52.5)
HDLC SERPL-MCNC: 54 MG/DL (ref 40–60)
HEMOGLOBIN: 15.9 G/DL (ref 13.5–17.5)
LDL CHOLESTEROL CALCULATED: 80 MG/DL
LYMPHOCYTES ABSOLUTE: 1.4 K/UL (ref 1–5.1)
LYMPHOCYTES RELATIVE PERCENT: 43.3 %
MCH RBC QN AUTO: 32.3 PG (ref 26–34)
MCHC RBC AUTO-ENTMCNC: 33.8 G/DL (ref 31–36)
MCV RBC AUTO: 95.6 FL (ref 80–100)
MONOCYTES ABSOLUTE: 0.4 K/UL (ref 0–1.3)
MONOCYTES RELATIVE PERCENT: 10.9 %
NEUTROPHILS ABSOLUTE: 1.3 K/UL (ref 1.7–7.7)
NEUTROPHILS RELATIVE PERCENT: 39.8 %
PDW BLD-RTO: 13.6 % (ref 12.4–15.4)
PLATELET # BLD: 167 K/UL (ref 135–450)
PMV BLD AUTO: 10.5 FL (ref 5–10.5)
POTASSIUM SERPL-SCNC: 4.8 MMOL/L (ref 3.5–5.1)
RBC # BLD: 4.91 M/UL (ref 4.2–5.9)
SODIUM BLD-SCNC: 142 MMOL/L (ref 136–145)
TOTAL PROTEIN: 6.4 G/DL (ref 6.4–8.2)
TRIGL SERPL-MCNC: 118 MG/DL (ref 0–150)
TSH SERPL DL<=0.05 MIU/L-ACNC: 2.99 UIU/ML (ref 0.27–4.2)
VLDLC SERPL CALC-MCNC: 24 MG/DL
WBC # BLD: 3.3 K/UL (ref 4–11)

## 2020-10-22 PROCEDURE — 36415 COLL VENOUS BLD VENIPUNCTURE: CPT | Performed by: INTERNAL MEDICINE

## 2020-10-23 LAB
ESTIMATED AVERAGE GLUCOSE: 191.5 MG/DL
HBA1C MFR BLD: 8.3 %

## 2020-10-27 ENCOUNTER — OFFICE VISIT (OUTPATIENT)
Dept: INTERNAL MEDICINE CLINIC | Age: 80
End: 2020-10-27
Payer: MEDICARE

## 2020-10-27 VITALS
HEART RATE: 100 BPM | BODY MASS INDEX: 27.22 KG/M2 | WEIGHT: 179 LBS | OXYGEN SATURATION: 97 % | RESPIRATION RATE: 18 BRPM | DIASTOLIC BLOOD PRESSURE: 82 MMHG | SYSTOLIC BLOOD PRESSURE: 132 MMHG

## 2020-10-27 PROCEDURE — G8484 FLU IMMUNIZE NO ADMIN: HCPCS | Performed by: INTERNAL MEDICINE

## 2020-10-27 PROCEDURE — 1036F TOBACCO NON-USER: CPT | Performed by: INTERNAL MEDICINE

## 2020-10-27 PROCEDURE — 1123F ACP DISCUSS/DSCN MKR DOCD: CPT | Performed by: INTERNAL MEDICINE

## 2020-10-27 PROCEDURE — 99214 OFFICE O/P EST MOD 30 MIN: CPT | Performed by: INTERNAL MEDICINE

## 2020-10-27 PROCEDURE — 3052F HG A1C>EQUAL 8.0%<EQUAL 9.0%: CPT | Performed by: INTERNAL MEDICINE

## 2020-10-27 PROCEDURE — 4040F PNEUMOC VAC/ADMIN/RCVD: CPT | Performed by: INTERNAL MEDICINE

## 2020-10-27 PROCEDURE — G8417 CALC BMI ABV UP PARAM F/U: HCPCS | Performed by: INTERNAL MEDICINE

## 2020-10-27 PROCEDURE — G8427 DOCREV CUR MEDS BY ELIG CLIN: HCPCS | Performed by: INTERNAL MEDICINE

## 2020-10-27 RX ORDER — LORAZEPAM 0.5 MG/1
0.5 TABLET ORAL 2 TIMES DAILY PRN
Qty: 30 TABLET | Refills: 0 | Status: SHIPPED | OUTPATIENT
Start: 2020-10-27 | End: 2021-02-02 | Stop reason: SDUPTHER

## 2020-10-27 NOTE — PROGRESS NOTES
Devorah Mansfield  1940  10/27/20    SUBJECTIVE:    HTN- bp at home has been good, consistently ~985 systolic. He feels gets more anxious after taking it, feels could also be raising his sugars. DM- SUGARS FLUCTUATING BUT STILL SL HIGH 8+/  AT HOME GLC ~105--170 BUT ONLY TAKING MEDS SPORADICALLY. HE'LL TRY TAKING NOW MORE OFTEN. Lab Results   Component Value Date    LABA1C 8.3 10/22/2020    LABA1C 8.1 06/10/2020    LABA1C 8.2 03/05/2020     Lab Results   Component Value Date    LABMICR 2.70 (H) 03/08/2019    LDLCALC 80 10/22/2020    CREATININE 1.2 10/22/2020     CHR ANXIETY, CAUSES FLARES OF BP AND SUGAR ELEVATION AT TIMES TOO. WE WILL ALSO RESTART LOW DOSE PRN ATIVAN, PLANNING FOR ~30 EVERY 3MO INTERVAL. Controlled substances monitoring: possible medication side effects, risk of tolerance and/or dependence, and alternative treatments discussed, no signs of potential drug abuse or diversion identified and [place]AR[place] report reviewed today- activity consistent with treatment plan. OBJECTIVE:    /82   Pulse 100   Resp 18   Wt 179 lb (81.2 kg)   SpO2 97%   BMI 27.22 kg/m²     Physical Exam  Vitals signs reviewed. Constitutional:       General: He is not in acute distress. Appearance: He is well-developed. HENT:      Head: Normocephalic and atraumatic. Eyes:      General: No scleral icterus. Right eye: No discharge. Left eye: No discharge. Conjunctiva/sclera: Conjunctivae normal.      Pupils: Pupils are equal, round, and reactive to light. Neck:      Musculoskeletal: Normal range of motion and neck supple. Thyroid: No thyromegaly. Vascular: No JVD. Trachea: No tracheal deviation. Cardiovascular:      Rate and Rhythm: Normal rate and regular rhythm. Heart sounds: Normal heart sounds. No murmur. No friction rub. No gallop. Pulmonary:      Effort: Pulmonary effort is normal. No respiratory distress. Breath sounds: Normal breath sounds. daily as needed for Anxiety for up to 30 days.

## 2020-12-18 RX ORDER — AMLODIPINE BESYLATE 5 MG/1
TABLET ORAL
Qty: 30 TABLET | Refills: 5 | Status: SHIPPED | OUTPATIENT
Start: 2020-12-18 | End: 2021-06-21

## 2021-01-15 ENCOUNTER — TELEPHONE (OUTPATIENT)
Dept: INTERNAL MEDICINE CLINIC | Age: 81
End: 2021-01-15

## 2021-01-18 ENCOUNTER — OFFICE VISIT (OUTPATIENT)
Dept: CARDIOLOGY CLINIC | Age: 81
End: 2021-01-18
Payer: MEDICARE

## 2021-01-18 VITALS
BODY MASS INDEX: 26.98 KG/M2 | SYSTOLIC BLOOD PRESSURE: 132 MMHG | DIASTOLIC BLOOD PRESSURE: 84 MMHG | HEART RATE: 66 BPM | WEIGHT: 182.2 LBS | HEIGHT: 69 IN

## 2021-01-18 DIAGNOSIS — I51.81 TAKOTSUBO CARDIOMYOPATHY: ICD-10-CM

## 2021-01-18 DIAGNOSIS — E11.9 CONTROLLED TYPE 2 DIABETES MELLITUS WITHOUT COMPLICATION, WITHOUT LONG-TERM CURRENT USE OF INSULIN (HCC): ICD-10-CM

## 2021-01-18 DIAGNOSIS — I10 ESSENTIAL HYPERTENSION: ICD-10-CM

## 2021-01-18 DIAGNOSIS — E78.2 MIXED HYPERLIPIDEMIA: ICD-10-CM

## 2021-01-18 DIAGNOSIS — I70.0 ATHEROSCLEROSIS OF AORTA (HCC): Primary | ICD-10-CM

## 2021-01-18 PROCEDURE — 1036F TOBACCO NON-USER: CPT | Performed by: INTERNAL MEDICINE

## 2021-01-18 PROCEDURE — G8484 FLU IMMUNIZE NO ADMIN: HCPCS | Performed by: INTERNAL MEDICINE

## 2021-01-18 PROCEDURE — 99214 OFFICE O/P EST MOD 30 MIN: CPT | Performed by: INTERNAL MEDICINE

## 2021-01-18 PROCEDURE — G8417 CALC BMI ABV UP PARAM F/U: HCPCS | Performed by: INTERNAL MEDICINE

## 2021-01-18 PROCEDURE — G8427 DOCREV CUR MEDS BY ELIG CLIN: HCPCS | Performed by: INTERNAL MEDICINE

## 2021-01-18 PROCEDURE — 4040F PNEUMOC VAC/ADMIN/RCVD: CPT | Performed by: INTERNAL MEDICINE

## 2021-01-18 PROCEDURE — 1123F ACP DISCUSS/DSCN MKR DOCD: CPT | Performed by: INTERNAL MEDICINE

## 2021-01-18 NOTE — PATIENT INSTRUCTIONS
Please be informed that if you contact our office outside of normal business hours the physician on call cannot help with any scheduling or rescheduling issues, procedure instruction questions or any type of medication issue. We advise you for any urgent/emergency that you go to the nearest emergency room!     PLEASE CALL OUR OFFICE DURING NORMAL BUSINESS HOURS    Monday - Friday   8 am to 5 pm    [place]: [phone]    [place]:  [phone]

## 2021-01-18 NOTE — LETTER
Dr. Itzel Phan  1940  [id number]    Have you had any Chest Pain that is not new? - No  Have you had any Shortness of Breath - No  Have you had any dizziness - No  Have you had any palpitations that are not new?  - No    Do you have any edema - swelling in No        Do you have a surgery or procedure scheduled in the near future - No
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
## 2021-01-20 ENCOUNTER — TELEPHONE (OUTPATIENT)
Dept: INTERNAL MEDICINE CLINIC | Age: 81
End: 2021-01-20

## 2021-01-20 ENCOUNTER — VIRTUAL VISIT (OUTPATIENT)
Dept: INTERNAL MEDICINE CLINIC | Age: 81
End: 2021-01-20
Payer: MEDICARE

## 2021-01-20 DIAGNOSIS — R50.9 FEVER, UNSPECIFIED FEVER CAUSE: Primary | ICD-10-CM

## 2021-01-20 PROCEDURE — 99441 PR PHYS/QHP TELEPHONE EVALUATION 5-10 MIN: CPT | Performed by: NURSE PRACTITIONER

## 2021-01-20 ASSESSMENT — PATIENT HEALTH QUESTIONNAIRE - PHQ9
SUM OF ALL RESPONSES TO PHQ QUESTIONS 1-9: 0
SUM OF ALL RESPONSES TO PHQ9 QUESTIONS 1 & 2: 0
1. LITTLE INTEREST OR PLEASURE IN DOING THINGS: 0
SUM OF ALL RESPONSES TO PHQ QUESTIONS 1-9: 0

## 2021-01-20 NOTE — PROGRESS NOTES
Iris Donald is a [de-identified] y.o. male evaluated via telephone on 1/20/2021. Consent:  He and/or health care decision maker is aware that that he may receive a bill for this telephone service, depending on his insurance coverage, and has provided verbal consent to proceed: Yes    In brief, Mr Jaylen Leonard is a current patient of Dr Medhat Marlow who presents to the office this morning for c/o low grade fevers, body aches, and \"swollen glands\" which started yesterday evening (<24 hours). Denies any nausea, emesis, diarrhea of other GI/ side effects. Denies any sinus pain/pressure, headaches, cough, shortness of breath, wheezing. Oral temperature this morning was 99.8. No recent sick contacts that he is aware of. He has only taken aspirin so far. States \"I just need medicine. \"     Documentation:  I communicated with the patient and/or health care decision maker about (as above). Details of this discussion including any medical advice provided:     Plan: Patient was asked in several different ways about his current concerns. With this abrupt onset of fevers, chills, and body aches, we discussed that I feel his recent symptoms are likely viral in nature given is lack of other symptoms to suggest systemic infection. Symptoms have also been present for less than 24 hours supporting that his illness is likely viral in nature. I do not feel Antibiotics are appropriate at this time. I did discuss use of Tylenol and Ibuprofen for pain/fever, however, patient states \"I dont take that, I just need medicine. \" He refuses to take any of this medications or any decongestants for a few days and also refusing any influenza or COVID testing. When asked if he had any exact concerns, treatments that have been effective in the past, or other issues he would like to discuss further he said \"no that's not okay\" angrily and hung up on me at that point.     I affirm this is a Patient Initiated Episode with a Patient who has not had a related

## 2021-01-20 NOTE — TELEPHONE ENCOUNTER
Pt had a visit with Bernard Levin today. After the visit he called back stating he didn't get anywhere with the nurse practitioner he wants an appt with Dr. Ember Herrera or medication called in.   Please advise

## 2021-01-20 NOTE — TELEPHONE ENCOUNTER
I scheduled an appt with Dr. Mark Anthony Pena for Friday. f he feels better then, he will call to cancel. Advised pt to push fluids, rest and tylenol as needed.

## 2021-01-22 ENCOUNTER — VIRTUAL VISIT (OUTPATIENT)
Dept: INTERNAL MEDICINE CLINIC | Age: 81
End: 2021-01-22
Payer: MEDICARE

## 2021-01-22 DIAGNOSIS — J01.00 ACUTE NON-RECURRENT MAXILLARY SINUSITIS: Primary | ICD-10-CM

## 2021-01-22 DIAGNOSIS — I10 ESSENTIAL HYPERTENSION: ICD-10-CM

## 2021-01-22 PROCEDURE — 99213 OFFICE O/P EST LOW 20 MIN: CPT | Performed by: INTERNAL MEDICINE

## 2021-01-22 PROCEDURE — 1123F ACP DISCUSS/DSCN MKR DOCD: CPT | Performed by: INTERNAL MEDICINE

## 2021-01-22 PROCEDURE — 4040F PNEUMOC VAC/ADMIN/RCVD: CPT | Performed by: INTERNAL MEDICINE

## 2021-01-22 PROCEDURE — G8417 CALC BMI ABV UP PARAM F/U: HCPCS | Performed by: INTERNAL MEDICINE

## 2021-01-22 PROCEDURE — G8427 DOCREV CUR MEDS BY ELIG CLIN: HCPCS | Performed by: INTERNAL MEDICINE

## 2021-01-22 PROCEDURE — G8484 FLU IMMUNIZE NO ADMIN: HCPCS | Performed by: INTERNAL MEDICINE

## 2021-01-22 PROCEDURE — 1036F TOBACCO NON-USER: CPT | Performed by: INTERNAL MEDICINE

## 2021-01-22 RX ORDER — AMOXICILLIN 500 MG/1
500 CAPSULE ORAL 2 TIMES DAILY
Qty: 14 CAPSULE | Refills: 0 | Status: SHIPPED | OUTPATIENT
Start: 2021-01-22 | End: 2021-01-29

## 2021-01-22 RX ORDER — HYDROCHLOROTHIAZIDE 25 MG/1
12.5 TABLET ORAL DAILY
Qty: 45 TABLET | Refills: 1
Start: 2021-01-22 | End: 2021-03-30

## 2021-01-22 NOTE — PROGRESS NOTES
2021    TELEHEALTH EVALUATION -- Audio/Visual (During ZSYTO-32 public health emergency)    HPI:    Romana Patel (:  1940) has requested an audio/video evaluation for the following concern(s): Had recent eval w Johnny Parson CNP. The past three days w onset of swollen glands, did try motrin which caused GI upset. Now w incr sinus congestion and some drainage, chills and low gr fever. He was very concerned about risk for worsening sinusitis. I reviewed Dr Karan Roman note and apparently was thought that he was not on diuretic but he has been continuing 1/2 tab of hctz so will cont. I will restart on medlist.    Review of Systems    Prior to Visit Medications    Medication Sig Taking? Authorizing Provider   amLODIPine (NORVASC) 5 MG tablet TAKE 1 TABLET BY MOUTH EVERY DAY Yes Arianna Bess MD   glipiZIDE (GLUCOTROL XL) 5 MG extended release tablet TAKE 10 MG IN AM AND THEN 5 MG TABLET BY MOUTH AT BEDTIME Yes Arianna Bess MD   hydrALAZINE (APRESOLINE) 10 MG tablet Take 1 tablet by mouth 2 times daily as needed (TAKE FOR SYSTOLIC BP >444 AND ALSO CAN TAKE 1 HOUR PRIOR TO ANY PHYSICIAN APPOINTMENTS.) Yes Arianna Bess MD   ONETOUCH ULTRA strip TEST 3 TIMES A DAY Yes Arianna Bess MD   rosuvastatin (CRESTOR) 10 MG tablet Take 1 tablet by mouth daily Yes Arianna Bess MD   lisinopril (PRINIVIL;ZESTRIL) 40 MG tablet Take 1 tablet by mouth daily Yes Arianna Bess MD   metFORMIN (GLUCOPHAGE) 500 MG tablet TAKE 1 TABLET BY MOUTH TWICE A DAY Yes Arianna Bess MD   aspirin EC 81 MG EC tablet Take 81 mg by mouth daily Yes Historical Provider, MD       Social History     Tobacco Use    Smoking status: Former Smoker     Packs/day: 1.00     Types: Cigarettes     Quit date: 1970     Years since quittin.0    Smokeless tobacco: Never Used   Substance Use Topics    Alcohol use: No    Drug use:  No             PHYSICAL EXAMINATION:  [ INSTRUCTIONS:  \"[x]\" to improve. Funmi Renner is a [de-identified] y.o. male being evaluated by a Virtual Visit (video visit) encounter to address concerns as mentioned above. A caregiver was present when appropriate. Due to this being a TeleHealth encounter (During SUUGK-13 public health emergency), evaluation of the following organ systems was limited: Vitals/Constitutional/EENT/Resp/CV/GI//MS/Neuro/Skin/Heme-Lymph-Imm. Pursuant to the emergency declaration under the [address] [place] [address], [address] [place] [address] and the [employer] Act, this Virtual Visit was conducted with patient's (and/or legal guardian's) consent, to reduce the patient's risk of exposure to COVID-19 and provide necessary medical care. The patient (and/or legal guardian) has also been advised to contact this office for worsening conditions or problems, and seek emergency medical treatment and/or call 911 if deemed necessary. Patient identification was verified at the start of the visit: Yes    Total time spent on this encounter: Not billed by time    Services were provided through a video synchronous discussion virtually to substitute for in-person clinic visit. Patient and provider were located at their individual homes. --Silvia Carr MD on 1/22/2021 at 2:50 PM    An electronic signature was used to authenticate this note.

## 2021-01-28 DIAGNOSIS — Z20.822 EXPOSURE TO COVID-19 VIRUS: Primary | ICD-10-CM

## 2021-02-02 ENCOUNTER — VIRTUAL VISIT (OUTPATIENT)
Dept: INTERNAL MEDICINE CLINIC | Age: 81
End: 2021-02-02
Payer: MEDICARE

## 2021-02-02 DIAGNOSIS — H93.8X2 CONGESTION OF LEFT EAR: ICD-10-CM

## 2021-02-02 DIAGNOSIS — F41.9 ANXIETY: ICD-10-CM

## 2021-02-02 DIAGNOSIS — I51.81 TAKOTSUBO CARDIOMYOPATHY: ICD-10-CM

## 2021-02-02 DIAGNOSIS — E78.2 MIXED HYPERLIPIDEMIA: ICD-10-CM

## 2021-02-02 DIAGNOSIS — E11.9 CONTROLLED TYPE 2 DIABETES MELLITUS WITHOUT COMPLICATION, WITHOUT LONG-TERM CURRENT USE OF INSULIN (HCC): Primary | ICD-10-CM

## 2021-02-02 DIAGNOSIS — I10 ESSENTIAL HYPERTENSION: ICD-10-CM

## 2021-02-02 PROCEDURE — 4040F PNEUMOC VAC/ADMIN/RCVD: CPT | Performed by: INTERNAL MEDICINE

## 2021-02-02 PROCEDURE — G8484 FLU IMMUNIZE NO ADMIN: HCPCS | Performed by: INTERNAL MEDICINE

## 2021-02-02 PROCEDURE — 1123F ACP DISCUSS/DSCN MKR DOCD: CPT | Performed by: INTERNAL MEDICINE

## 2021-02-02 PROCEDURE — 99214 OFFICE O/P EST MOD 30 MIN: CPT | Performed by: INTERNAL MEDICINE

## 2021-02-02 PROCEDURE — 1036F TOBACCO NON-USER: CPT | Performed by: INTERNAL MEDICINE

## 2021-02-02 PROCEDURE — G8417 CALC BMI ABV UP PARAM F/U: HCPCS | Performed by: INTERNAL MEDICINE

## 2021-02-02 PROCEDURE — G8427 DOCREV CUR MEDS BY ELIG CLIN: HCPCS | Performed by: INTERNAL MEDICINE

## 2021-02-02 RX ORDER — LORAZEPAM 0.5 MG/1
0.5 TABLET ORAL 2 TIMES DAILY PRN
Qty: 30 TABLET | Refills: 0 | Status: SHIPPED | OUTPATIENT
Start: 2021-02-02 | End: 2021-07-13 | Stop reason: SDUPTHER

## 2021-02-02 RX ORDER — PREDNISONE 1 MG/1
TABLET ORAL
Qty: 21 TABLET | Refills: 0 | Status: SHIPPED | OUTPATIENT
Start: 2021-02-02 | End: 2021-05-27

## 2021-02-02 NOTE — PROGRESS NOTES
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 Prior flu like sx and sinus congestion resolved after abx last time. Some residual congestion noted on L. DM-  LAST A1C SL HIGH, DUE FOR RECHECK LAB  Lab Results   Component Value Date    LABA1C 8.3 10/22/2020    LABA1C 8.1 06/10/2020    LABA1C 8.2 2020     Lab Results   Component Value Date    LABMICR 2.70 (H) 2019    LDLCALC 80 10/22/2020    CREATININE 1.2 10/22/2020     CHR ANXIETY, ANXIETY STABLE BUT CHR D/T COVID. RF ATIVAN, Controlled substances monitoring: possible medication side effects, risk of tolerance and/or dependence, and alternative treatments discussed, no signs of potential drug abuse or diversion identified and OARRS report reviewed today- activity consistent with treatment plan. Hypertension: Stable. Denies CP, SOB, cough, visual changes, dizziness, palpitations or HA. CARDIOMYOPATHY, CONT F/U DR REYNOLDS AND NO SX AS NOTED          Review of Systems    Prior to Visit Medications    Medication Sig Taking?  Authorizing Provider   hydroCHLOROthiazide (HYDRODIURIL) 25 MG tablet Take 0.5 tablets by mouth daily Yes Vanesa Arteaga MD   amLODIPine (NORVASC) 5 MG tablet TAKE 1 TABLET BY MOUTH EVERY DAY Yes Vanesa Arteaga MD   glipiZIDE (GLUCOTROL XL) 5 MG extended release tablet TAKE 10 MG IN AM AND THEN 5 MG TABLET BY MOUTH AT BEDTIME Yes Vanesa Arteaga MD   hydrALAZINE (APRESOLINE) 10 MG tablet Take 1 tablet by mouth 2 times daily as needed (TAKE FOR SYSTOLIC BP >077 AND ALSO CAN TAKE 1 HOUR PRIOR TO ANY PHYSICIAN APPOINTMENTS.) Yes Vanesa Arteaga MD   rosuvastatin (CRESTOR) 10 MG tablet Take 1 tablet by mouth daily Yes Vanesa Arteaga MD   lisinopril (PRINIVIL;ZESTRIL) 40 MG tablet Take 1 tablet by mouth daily Yes Juwan Higuera MD   metFORMIN (GLUCOPHAGE) 500 MG tablet TAKE 1 TABLET BY MOUTH TWICE A DAY Yes Juwan Higuera MD   aspirin EC 81 MG EC tablet Take 81 mg by mouth daily Yes Historical Provider, MD Musa Bonilla strip TEST 15 Ian Madrid MD       Social History     Tobacco Use    Smoking status: Former Smoker     Packs/day: 1.00     Types: Cigarettes     Quit date: 1970     Years since quittin.1    Smokeless tobacco: Never Used   Substance Use Topics    Alcohol use: No    Drug use: No           PHYSICAL EXAMINATION:  [ INSTRUCTIONS:  \"[x]\" Indicates a positive item  \"[]\" Indicates a negative item  -- DELETE ALL ITEMS NOT EXAMINED]  Vital Signs: (As obtained by patient/caregiver or practitioner observation)    Blood pressure-  Heart rate-    Respiratory rate-    Temperature-  Pulse oximetry-     Constitutional: [x] Appears well-developed and well-nourished [] No apparent distress      [] Abnormal-   Mental status  [x] Alert and awake  [] Oriented to person/place/time []Able to follow commands      Eyes:  EOM    []  Normal  [] Abnormal-  Sclera  []  Normal  [] Abnormal -         Discharge []  None visible  [] Abnormal -    HENT:   [] Normocephalic, atraumatic.   [] Abnormal   [] Mouth/Throat: Mucous membranes are moist.     External Ears [] Normal  [] Abnormal-     Neck: [] No visualized mass     Pulmonary/Chest: [x] Respiratory effort normal.  [] No visualized signs of difficulty breathing or respiratory distress        [] Abnormal-      Musculoskeletal:   [] Normal gait with no signs of ataxia         [] Normal range of motion of neck        [] Abnormal-       Neurological:        [] No Facial Asymmetry (Cranial nerve 7 motor function) (limited exam to video visit)          [] No gaze palsy        [] Abnormal-         Skin:        [] No significant exanthematous lesions or discoloration noted on facial skin         [] Abnormal-            Psychiatric:       [x] Normal being a TeleHealth encounter (During AOGIG-51 public health emergency), evaluation of the following organ systems was limited: Vitals/Constitutional/EENT/Resp/CV/GI//MS/Neuro/Skin/Heme-Lymph-Imm. Pursuant to the emergency declaration under the [address] [place] [address], [address] [place] [address] and the [employer] Act, this Virtual Visit was conducted with patient's (and/or legal guardian's) consent, to reduce the patient's risk of exposure to COVID-19 and provide necessary medical care. The patient (and/or legal guardian) has also been advised to contact this office for worsening conditions or problems, and seek emergency medical treatment and/or call 911 if deemed necessary. Patient identification was verified at the start of the visit: Yes    Total time spent on this encounter: Not billed by time    Services were provided through a video synchronous discussion virtually to substitute for in-person clinic visit. Patient and provider were located at their individual homes. --Renetta Copeland MD on 2/2/2021 at 10:15 AM    An electronic signature was used to authenticate this note.

## 2021-02-23 DIAGNOSIS — E11.9 CONTROLLED TYPE 2 DIABETES MELLITUS WITHOUT COMPLICATION, WITHOUT LONG-TERM CURRENT USE OF INSULIN (HCC): ICD-10-CM

## 2021-02-23 DIAGNOSIS — I10 ESSENTIAL HYPERTENSION: ICD-10-CM

## 2021-02-23 RX ORDER — LISINOPRIL 40 MG/1
TABLET ORAL
Qty: 90 TABLET | Refills: 1 | Status: SHIPPED | OUTPATIENT
Start: 2021-02-23 | End: 2021-08-18

## 2021-02-25 DIAGNOSIS — E11.9 CONTROLLED TYPE 2 DIABETES MELLITUS WITHOUT COMPLICATION, WITHOUT LONG-TERM CURRENT USE OF INSULIN (HCC): ICD-10-CM

## 2021-03-30 DIAGNOSIS — I10 ESSENTIAL HYPERTENSION: ICD-10-CM

## 2021-03-30 RX ORDER — HYDROCHLOROTHIAZIDE 25 MG/1
TABLET ORAL
Qty: 45 TABLET | Refills: 1 | Status: SHIPPED | OUTPATIENT
Start: 2021-03-30 | End: 2021-09-28

## 2021-05-13 DIAGNOSIS — I10 ESSENTIAL HYPERTENSION: ICD-10-CM

## 2021-05-13 DIAGNOSIS — E11.9 CONTROLLED TYPE 2 DIABETES MELLITUS WITHOUT COMPLICATION, WITHOUT LONG-TERM CURRENT USE OF INSULIN (HCC): ICD-10-CM

## 2021-05-13 DIAGNOSIS — E78.2 MIXED HYPERLIPIDEMIA: ICD-10-CM

## 2021-05-13 LAB
A/G RATIO: 2.7 (ref 1.1–2.2)
ALBUMIN SERPL-MCNC: 4.8 G/DL (ref 3.4–5)
ALP BLD-CCNC: 85 U/L (ref 40–129)
ALT SERPL-CCNC: 16 U/L (ref 10–40)
ANION GAP SERPL CALCULATED.3IONS-SCNC: 15 MMOL/L (ref 3–16)
AST SERPL-CCNC: 21 U/L (ref 15–37)
BASOPHILS ABSOLUTE: 0 K/UL (ref 0–0.2)
BASOPHILS RELATIVE PERCENT: 1 %
BILIRUB SERPL-MCNC: 0.5 MG/DL (ref 0–1)
BUN BLDV-MCNC: 36 MG/DL (ref 7–20)
CALCIUM SERPL-MCNC: 9.5 MG/DL (ref 8.3–10.6)
CHLORIDE BLD-SCNC: 99 MMOL/L (ref 99–110)
CHOLESTEROL, TOTAL: 157 MG/DL (ref 0–199)
CO2: 26 MMOL/L (ref 21–32)
CREAT SERPL-MCNC: 1.3 MG/DL (ref 0.8–1.3)
CREATININE URINE: 118.1 MG/DL (ref 39–259)
EOSINOPHILS ABSOLUTE: 0.2 K/UL (ref 0–0.6)
EOSINOPHILS RELATIVE PERCENT: 6.6 %
GFR AFRICAN AMERICAN: >60
GFR NON-AFRICAN AMERICAN: 53
GLOBULIN: 1.8 G/DL
GLUCOSE BLD-MCNC: 176 MG/DL (ref 70–99)
HCT VFR BLD CALC: 44.2 % (ref 40.5–52.5)
HDLC SERPL-MCNC: 54 MG/DL (ref 40–60)
HEMOGLOBIN: 15 G/DL (ref 13.5–17.5)
LDL CHOLESTEROL CALCULATED: 88 MG/DL
LYMPHOCYTES ABSOLUTE: 1.2 K/UL (ref 1–5.1)
LYMPHOCYTES RELATIVE PERCENT: 39.3 %
MCH RBC QN AUTO: 32.6 PG (ref 26–34)
MCHC RBC AUTO-ENTMCNC: 33.8 G/DL (ref 31–36)
MCV RBC AUTO: 96.4 FL (ref 80–100)
MICROALBUMIN UR-MCNC: 2 MG/DL
MICROALBUMIN/CREAT UR-RTO: 16.9 MG/G (ref 0–30)
MONOCYTES ABSOLUTE: 0.4 K/UL (ref 0–1.3)
MONOCYTES RELATIVE PERCENT: 11.8 %
NEUTROPHILS ABSOLUTE: 1.3 K/UL (ref 1.7–7.7)
NEUTROPHILS RELATIVE PERCENT: 41.3 %
PDW BLD-RTO: 14 % (ref 12.4–15.4)
PLATELET # BLD: 172 K/UL (ref 135–450)
PMV BLD AUTO: 10.7 FL (ref 5–10.5)
POTASSIUM SERPL-SCNC: 4.7 MMOL/L (ref 3.5–5.1)
RBC # BLD: 4.59 M/UL (ref 4.2–5.9)
SODIUM BLD-SCNC: 140 MMOL/L (ref 136–145)
TOTAL PROTEIN: 6.6 G/DL (ref 6.4–8.2)
TRIGL SERPL-MCNC: 75 MG/DL (ref 0–150)
VLDLC SERPL CALC-MCNC: 15 MG/DL
WBC # BLD: 3.1 K/UL (ref 4–11)

## 2021-05-13 PROCEDURE — 36415 COLL VENOUS BLD VENIPUNCTURE: CPT | Performed by: INTERNAL MEDICINE

## 2021-05-14 LAB
ESTIMATED AVERAGE GLUCOSE: 185.8 MG/DL
HBA1C MFR BLD: 8.1 %

## 2021-05-27 ENCOUNTER — OFFICE VISIT (OUTPATIENT)
Dept: INTERNAL MEDICINE CLINIC | Age: 81
End: 2021-05-27
Payer: MEDICARE

## 2021-05-27 VITALS
BODY MASS INDEX: 25.67 KG/M2 | RESPIRATION RATE: 16 BRPM | WEIGHT: 173.8 LBS | OXYGEN SATURATION: 96 % | SYSTOLIC BLOOD PRESSURE: 122 MMHG | HEART RATE: 54 BPM | DIASTOLIC BLOOD PRESSURE: 70 MMHG

## 2021-05-27 DIAGNOSIS — K52.9 ACUTE GASTROENTERITIS: Primary | ICD-10-CM

## 2021-05-27 DIAGNOSIS — C18.9 ADENOCARCINOMA OF COLON (HCC): ICD-10-CM

## 2021-05-27 PROCEDURE — G8427 DOCREV CUR MEDS BY ELIG CLIN: HCPCS | Performed by: INTERNAL MEDICINE

## 2021-05-27 PROCEDURE — G8417 CALC BMI ABV UP PARAM F/U: HCPCS | Performed by: INTERNAL MEDICINE

## 2021-05-27 PROCEDURE — 4040F PNEUMOC VAC/ADMIN/RCVD: CPT | Performed by: INTERNAL MEDICINE

## 2021-05-27 PROCEDURE — 99213 OFFICE O/P EST LOW 20 MIN: CPT | Performed by: INTERNAL MEDICINE

## 2021-05-27 PROCEDURE — 1123F ACP DISCUSS/DSCN MKR DOCD: CPT | Performed by: INTERNAL MEDICINE

## 2021-05-27 PROCEDURE — 1036F TOBACCO NON-USER: CPT | Performed by: INTERNAL MEDICINE

## 2021-05-27 RX ORDER — ONDANSETRON 4 MG/1
4 TABLET, FILM COATED ORAL 3 TIMES DAILY PRN
Qty: 30 TABLET | Refills: 0 | Status: SHIPPED | OUTPATIENT
Start: 2021-05-27 | End: 2022-01-11

## 2021-05-27 RX ORDER — CIPROFLOXACIN 250 MG/1
250 TABLET, FILM COATED ORAL 2 TIMES DAILY
Qty: 6 TABLET | Refills: 0 | Status: SHIPPED | OUTPATIENT
Start: 2021-05-27 | End: 2021-05-30

## 2021-05-27 NOTE — PROGRESS NOTES
Mariya Leal  1940 05/27/21    SUBJECTIVE:    6d hx of n/v and diarrhea, some stomach upset. Tried imodium w some benefit. Denies fever or chills. Watery Bms noted, now slowing down. Denies abd cramping. Appetite slowly improving. Hx colon ca and was to do last lower endoscopy Dr Andrés Orellana 2020 but delayed d/t covid and now he does not go to [facility], so we'll refer Dr Delfino Infante. OBJECTIVE:    /70   Pulse 54   Resp 16   Wt 173 lb 12.8 oz (78.8 kg)   SpO2 96%   BMI 25.67 kg/m²     Physical Exam  Vitals reviewed. Constitutional:       Appearance: He is well-developed. Cardiovascular:      Rate and Rhythm: Normal rate and regular rhythm. Heart sounds: Normal heart sounds. No murmur heard. No friction rub. No gallop. Pulmonary:      Effort: Pulmonary effort is normal. No respiratory distress. Breath sounds: Normal breath sounds. No wheezing or rales. Abdominal:      General: Bowel sounds are normal. There is no distension. Palpations: Abdomen is soft. Tenderness: There is no abdominal tenderness. Musculoskeletal:      Cervical back: Neck supple. Neurological:      Mental Status: He is alert. ASSESSMENT:    1. Acute gastroenteritis    2. Adenocarcinoma of colon [facility])        PLAN:    Kashif Prabhakar was seen today for other. Diagnoses and all orders for this visit:    Acute gastroenteritis- POSSIBLY FR FOOD,  Consistent w presentation, rec rx as below and fluids, rest.  Pt to call back one week if not improving.        -     ondansetron (ZOFRAN) 4 MG tablet; Take 1 tablet by mouth 3 times daily as needed for Nausea or Vomiting  -     ciprofloxacin (CIPRO) 250 MG tablet;  Take 1 tablet by mouth 2 times daily for 3 days    Adenocarcinoma of colon ([facility] [address]- REFER PRESTON RAYMUNDO  -     Amb External Referral To Gastroenterology

## 2021-06-24 LAB — DIABETIC RETINOPATHY: NEGATIVE

## 2021-07-13 ENCOUNTER — OFFICE VISIT (OUTPATIENT)
Dept: INTERNAL MEDICINE CLINIC | Age: 81
End: 2021-07-13
Payer: MEDICARE

## 2021-07-13 VITALS
DIASTOLIC BLOOD PRESSURE: 68 MMHG | BODY MASS INDEX: 25.84 KG/M2 | RESPIRATION RATE: 14 BRPM | WEIGHT: 175 LBS | HEART RATE: 77 BPM | SYSTOLIC BLOOD PRESSURE: 140 MMHG | OXYGEN SATURATION: 97 %

## 2021-07-13 DIAGNOSIS — I10 ESSENTIAL HYPERTENSION: ICD-10-CM

## 2021-07-13 DIAGNOSIS — F41.9 ANXIETY: ICD-10-CM

## 2021-07-13 DIAGNOSIS — C18.9 ADENOCARCINOMA OF COLON (HCC): ICD-10-CM

## 2021-07-13 DIAGNOSIS — E11.9 CONTROLLED TYPE 2 DIABETES MELLITUS WITHOUT COMPLICATION, WITHOUT LONG-TERM CURRENT USE OF INSULIN (HCC): Primary | ICD-10-CM

## 2021-07-13 PROCEDURE — 3052F HG A1C>EQUAL 8.0%<EQUAL 9.0%: CPT | Performed by: INTERNAL MEDICINE

## 2021-07-13 PROCEDURE — 1123F ACP DISCUSS/DSCN MKR DOCD: CPT | Performed by: INTERNAL MEDICINE

## 2021-07-13 PROCEDURE — 1036F TOBACCO NON-USER: CPT | Performed by: INTERNAL MEDICINE

## 2021-07-13 PROCEDURE — 99214 OFFICE O/P EST MOD 30 MIN: CPT | Performed by: INTERNAL MEDICINE

## 2021-07-13 PROCEDURE — 4040F PNEUMOC VAC/ADMIN/RCVD: CPT | Performed by: INTERNAL MEDICINE

## 2021-07-13 PROCEDURE — G8417 CALC BMI ABV UP PARAM F/U: HCPCS | Performed by: INTERNAL MEDICINE

## 2021-07-13 PROCEDURE — G8427 DOCREV CUR MEDS BY ELIG CLIN: HCPCS | Performed by: INTERNAL MEDICINE

## 2021-07-13 RX ORDER — LORAZEPAM 0.5 MG/1
0.5 TABLET ORAL 2 TIMES DAILY PRN
Qty: 30 TABLET | Refills: 0 | Status: SHIPPED | OUTPATIENT
Start: 2021-07-13 | End: 2021-10-12 | Stop reason: SDUPTHER

## 2021-07-13 RX ORDER — AMLODIPINE BESYLATE 5 MG/1
5 TABLET ORAL DAILY
Qty: 90 TABLET | Refills: 1 | Status: SHIPPED | OUTPATIENT
Start: 2021-07-13 | End: 2021-12-13

## 2021-07-13 RX ORDER — EMPAGLIFLOZIN 10 MG/1
1 TABLET, FILM COATED ORAL DAILY
Qty: 90 TABLET | Refills: 1 | Status: SHIPPED | OUTPATIENT
Start: 2021-07-13 | End: 2021-12-13

## 2021-07-13 NOTE — PROGRESS NOTES
Jenna Inch  1940 07/13/21    SUBJECTIVE:    DM- last a1c sl high, cont work w diet  At home runs ~120-160. Lab Results   Component Value Date    LABA1C 8.1 05/13/2021    LABA1C 8.3 10/22/2020    LABA1C 8.1 06/10/2020     Lab Results   Component Value Date    LABMICR 2.00 (H) 05/13/2021    LDLCALC 88 05/13/2021    CREATININE 1.3 05/13/2021     Periodic anxiety stable w ativan prn, Controlled substances monitoring: possible medication side effects, risk of tolerance and/or dependence, and alternative treatments discussed, no signs of potential drug abuse or diversion identified and OARRS report reviewed today- activity consistent with treatment plan. Last filled in Feb.    Hypertension: Stable. Denies CP, SOB, cough, visual changes, dizziness, palpitations or HA. Colon CA- Dr Lokesh Soto retired, due for recheck w Dr Nilo Sorensen. HE WAS HESITANT TO PROCEED D/T PREP    OBJECTIVE:    BP (!) 140/68   Pulse 77   Resp 14   Wt 175 lb (79.4 kg)   SpO2 97%   BMI 25.84 kg/m²     Physical Exam  Vitals reviewed. Constitutional:       Appearance: He is well-developed. Cardiovascular:      Rate and Rhythm: Normal rate and regular rhythm. Heart sounds: Normal heart sounds. No murmur heard. No friction rub. No gallop. Pulmonary:      Effort: Pulmonary effort is normal. No respiratory distress. Breath sounds: Normal breath sounds. No wheezing or rales. Abdominal:      General: Bowel sounds are normal. There is no distension. Palpations: Abdomen is soft. Tenderness: There is no abdominal tenderness. Musculoskeletal:      Cervical back: Neck supple. Neurological:      Mental Status: He is alert. ASSESSMENT:    1. Controlled type 2 diabetes mellitus without complication, without long-term current use of insulin ([address]    2. Essential hypertension    3. Anxiety        PLAN:    Tiffanie Kimbrough was seen today for diabetes.     Diagnoses and all orders for this visit:    Controlled type 2 diabetes mellitus without complication, without long-term current use of insulin ([facility]- A1C SL HIGH, WE'LL TRY JARDIANCE TO IMPROVE SUGARS FURTHER. -     empagliflozin (JARDIANCE) 10 MG tablet; Take 1 tablet by mouth daily  -     Comprehensive Metabolic Panel; Future  -     CBC Auto Differential; Future  -     Lipid Panel; Future  -     Hemoglobin A1C; Future    Essential hypertension - Blood pressure stable and will continue current regimen. Will plan periodic monitoring of renal function, electrolytes, lipid profile. -     amLODIPine (NORVASC) 5 MG tablet; Take 1 tablet by mouth daily  -     Comprehensive Metabolic Panel; Future  -     CBC Auto Differential; Future  -     Lipid Panel; Future    Anxiety- CONT RX PRN AND MONITOR  -     LORazepam (ATIVAN) 0.5 MG tablet; Take 1 tablet by mouth 2 times daily as needed for Anxiety for up to 30 days.     Adenocarcinoma of colon ([facility]- REFER DR RAYMUNDO AND WE CALLED HIS OFFICE TO REQUEST ALSO IF CAN TRY EASIER PREP  -     Amb External Referral To Gastroenterology

## 2021-07-28 DIAGNOSIS — E11.9 CONTROLLED TYPE 2 DIABETES MELLITUS WITHOUT COMPLICATION, WITHOUT LONG-TERM CURRENT USE OF INSULIN (HCC): ICD-10-CM

## 2021-07-28 RX ORDER — GLIPIZIDE 5 MG/1
TABLET, FILM COATED, EXTENDED RELEASE ORAL
Qty: 270 TABLET | Refills: 1 | Status: SHIPPED | OUTPATIENT
Start: 2021-07-28 | End: 2022-07-05

## 2021-08-03 ENCOUNTER — OFFICE VISIT (OUTPATIENT)
Dept: CARDIOLOGY CLINIC | Age: 81
End: 2021-08-03
Payer: MEDICARE

## 2021-08-03 VITALS
SYSTOLIC BLOOD PRESSURE: 130 MMHG | BODY MASS INDEX: 26.52 KG/M2 | WEIGHT: 175 LBS | HEIGHT: 68 IN | DIASTOLIC BLOOD PRESSURE: 88 MMHG | HEART RATE: 50 BPM

## 2021-08-03 DIAGNOSIS — E78.2 MIXED HYPERLIPIDEMIA: ICD-10-CM

## 2021-08-03 DIAGNOSIS — I51.81 TAKOTSUBO CARDIOMYOPATHY: Primary | ICD-10-CM

## 2021-08-03 DIAGNOSIS — F41.9 ANXIETY: ICD-10-CM

## 2021-08-03 DIAGNOSIS — I10 ESSENTIAL HYPERTENSION: ICD-10-CM

## 2021-08-03 PROCEDURE — 4040F PNEUMOC VAC/ADMIN/RCVD: CPT | Performed by: INTERNAL MEDICINE

## 2021-08-03 PROCEDURE — 1123F ACP DISCUSS/DSCN MKR DOCD: CPT | Performed by: INTERNAL MEDICINE

## 2021-08-03 PROCEDURE — 99214 OFFICE O/P EST MOD 30 MIN: CPT | Performed by: INTERNAL MEDICINE

## 2021-08-03 PROCEDURE — 1036F TOBACCO NON-USER: CPT | Performed by: INTERNAL MEDICINE

## 2021-08-03 PROCEDURE — G8417 CALC BMI ABV UP PARAM F/U: HCPCS | Performed by: INTERNAL MEDICINE

## 2021-08-03 PROCEDURE — G8427 DOCREV CUR MEDS BY ELIG CLIN: HCPCS | Performed by: INTERNAL MEDICINE
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
## 2021-08-16 DIAGNOSIS — E11.9 CONTROLLED TYPE 2 DIABETES MELLITUS WITHOUT COMPLICATION, WITHOUT LONG-TERM CURRENT USE OF INSULIN (HCC): ICD-10-CM

## 2021-08-16 DIAGNOSIS — I10 ESSENTIAL HYPERTENSION: ICD-10-CM

## 2021-08-16 LAB
A/G RATIO: 2.2 (ref 1.1–2.2)
ALBUMIN SERPL-MCNC: 4.7 G/DL (ref 3.4–5)
ALP BLD-CCNC: 70 U/L (ref 40–129)
ALT SERPL-CCNC: 18 U/L (ref 10–40)
ANION GAP SERPL CALCULATED.3IONS-SCNC: 14 MMOL/L (ref 3–16)
AST SERPL-CCNC: 22 U/L (ref 15–37)
BASOPHILS ABSOLUTE: 0 K/UL (ref 0–0.2)
BASOPHILS RELATIVE PERCENT: 0.7 %
BILIRUB SERPL-MCNC: 0.5 MG/DL (ref 0–1)
BUN BLDV-MCNC: 27 MG/DL (ref 7–20)
CALCIUM SERPL-MCNC: 9.4 MG/DL (ref 8.3–10.6)
CHLORIDE BLD-SCNC: 102 MMOL/L (ref 99–110)
CHOLESTEROL, TOTAL: 153 MG/DL (ref 0–199)
CO2: 26 MMOL/L (ref 21–32)
CREAT SERPL-MCNC: 1.1 MG/DL (ref 0.8–1.3)
EOSINOPHILS ABSOLUTE: 0.1 K/UL (ref 0–0.6)
EOSINOPHILS RELATIVE PERCENT: 4.7 %
GFR AFRICAN AMERICAN: >60
GFR NON-AFRICAN AMERICAN: >60
GLOBULIN: 2.1 G/DL
GLUCOSE BLD-MCNC: 147 MG/DL (ref 70–99)
HCT VFR BLD CALC: 43.9 % (ref 40.5–52.5)
HDLC SERPL-MCNC: 58 MG/DL (ref 40–60)
HEMOGLOBIN: 15.2 G/DL (ref 13.5–17.5)
LDL CHOLESTEROL CALCULATED: 75 MG/DL
LYMPHOCYTES ABSOLUTE: 1.4 K/UL (ref 1–5.1)
LYMPHOCYTES RELATIVE PERCENT: 44.2 %
MCH RBC QN AUTO: 33 PG (ref 26–34)
MCHC RBC AUTO-ENTMCNC: 34.6 G/DL (ref 31–36)
MCV RBC AUTO: 95.6 FL (ref 80–100)
MONOCYTES ABSOLUTE: 0.3 K/UL (ref 0–1.3)
MONOCYTES RELATIVE PERCENT: 10.8 %
NEUTROPHILS ABSOLUTE: 1.2 K/UL (ref 1.7–7.7)
NEUTROPHILS RELATIVE PERCENT: 39.6 %
PDW BLD-RTO: 14.5 % (ref 12.4–15.4)
PLATELET # BLD: 171 K/UL (ref 135–450)
PMV BLD AUTO: 10.6 FL (ref 5–10.5)
POTASSIUM SERPL-SCNC: 4.2 MMOL/L (ref 3.5–5.1)
RBC # BLD: 4.59 M/UL (ref 4.2–5.9)
SODIUM BLD-SCNC: 142 MMOL/L (ref 136–145)
TOTAL PROTEIN: 6.8 G/DL (ref 6.4–8.2)
TRIGL SERPL-MCNC: 102 MG/DL (ref 0–150)
VLDLC SERPL CALC-MCNC: 20 MG/DL
WBC # BLD: 3.1 K/UL (ref 4–11)

## 2021-08-16 PROCEDURE — 36415 COLL VENOUS BLD VENIPUNCTURE: CPT | Performed by: INTERNAL MEDICINE

## 2021-08-17 LAB
ESTIMATED AVERAGE GLUCOSE: 168.6 MG/DL
HBA1C MFR BLD: 7.5 %

## 2021-08-18 DIAGNOSIS — E11.9 CONTROLLED TYPE 2 DIABETES MELLITUS WITHOUT COMPLICATION, WITHOUT LONG-TERM CURRENT USE OF INSULIN (HCC): ICD-10-CM

## 2021-08-18 DIAGNOSIS — I10 ESSENTIAL HYPERTENSION: ICD-10-CM

## 2021-08-18 RX ORDER — LISINOPRIL 40 MG/1
TABLET ORAL
Qty: 90 TABLET | Refills: 1 | Status: SHIPPED | OUTPATIENT
Start: 2021-08-18 | End: 2022-03-07

## 2021-09-17 DIAGNOSIS — E11.9 CONTROLLED TYPE 2 DIABETES MELLITUS WITHOUT COMPLICATION, WITHOUT LONG-TERM CURRENT USE OF INSULIN (HCC): ICD-10-CM

## 2021-09-28 DIAGNOSIS — I10 ESSENTIAL HYPERTENSION: ICD-10-CM

## 2021-09-28 RX ORDER — HYDROCHLOROTHIAZIDE 25 MG/1
TABLET ORAL
Qty: 45 TABLET | Refills: 1 | Status: SHIPPED | OUTPATIENT
Start: 2021-09-28 | End: 2022-04-19 | Stop reason: SDUPTHER

## 2021-10-12 ENCOUNTER — OFFICE VISIT (OUTPATIENT)
Dept: INTERNAL MEDICINE CLINIC | Age: 81
End: 2021-10-12
Payer: MEDICARE

## 2021-10-12 VITALS
SYSTOLIC BLOOD PRESSURE: 132 MMHG | HEIGHT: 68 IN | RESPIRATION RATE: 14 BRPM | DIASTOLIC BLOOD PRESSURE: 76 MMHG | WEIGHT: 172 LBS | BODY MASS INDEX: 26.07 KG/M2 | HEART RATE: 72 BPM | OXYGEN SATURATION: 98 %

## 2021-10-12 DIAGNOSIS — E11.9 CONTROLLED TYPE 2 DIABETES MELLITUS WITHOUT COMPLICATION, WITHOUT LONG-TERM CURRENT USE OF INSULIN (HCC): ICD-10-CM

## 2021-10-12 DIAGNOSIS — Z23 NEED FOR INFLUENZA VACCINATION: ICD-10-CM

## 2021-10-12 DIAGNOSIS — Z00.00 ROUTINE GENERAL MEDICAL EXAMINATION AT A HEALTH CARE FACILITY: Primary | ICD-10-CM

## 2021-10-12 DIAGNOSIS — I10 PRIMARY HYPERTENSION: ICD-10-CM

## 2021-10-12 DIAGNOSIS — I51.81 TAKOTSUBO CARDIOMYOPATHY: ICD-10-CM

## 2021-10-12 DIAGNOSIS — I70.0 ATHEROSCLEROSIS OF AORTA (HCC): ICD-10-CM

## 2021-10-12 DIAGNOSIS — H35.3210 EXUDATIVE AGE-RELATED MACULAR DEGENERATION OF RIGHT EYE, UNSPECIFIED STAGE (HCC): ICD-10-CM

## 2021-10-12 DIAGNOSIS — F41.9 ANXIETY: ICD-10-CM

## 2021-10-12 DIAGNOSIS — C18.9 ADENOCARCINOMA OF COLON (HCC): ICD-10-CM

## 2021-10-12 PROCEDURE — G8427 DOCREV CUR MEDS BY ELIG CLIN: HCPCS | Performed by: INTERNAL MEDICINE

## 2021-10-12 PROCEDURE — 90694 VACC AIIV4 NO PRSRV 0.5ML IM: CPT | Performed by: INTERNAL MEDICINE

## 2021-10-12 PROCEDURE — G0439 PPPS, SUBSEQ VISIT: HCPCS | Performed by: INTERNAL MEDICINE

## 2021-10-12 PROCEDURE — 3051F HG A1C>EQUAL 7.0%<8.0%: CPT | Performed by: INTERNAL MEDICINE

## 2021-10-12 PROCEDURE — 4040F PNEUMOC VAC/ADMIN/RCVD: CPT | Performed by: INTERNAL MEDICINE

## 2021-10-12 PROCEDURE — G8484 FLU IMMUNIZE NO ADMIN: HCPCS | Performed by: INTERNAL MEDICINE

## 2021-10-12 PROCEDURE — G0008 ADMIN INFLUENZA VIRUS VAC: HCPCS | Performed by: INTERNAL MEDICINE

## 2021-10-12 PROCEDURE — 1036F TOBACCO NON-USER: CPT | Performed by: INTERNAL MEDICINE

## 2021-10-12 PROCEDURE — G8417 CALC BMI ABV UP PARAM F/U: HCPCS | Performed by: INTERNAL MEDICINE

## 2021-10-12 PROCEDURE — 99214 OFFICE O/P EST MOD 30 MIN: CPT | Performed by: INTERNAL MEDICINE

## 2021-10-12 PROCEDURE — 1123F ACP DISCUSS/DSCN MKR DOCD: CPT | Performed by: INTERNAL MEDICINE

## 2021-10-12 RX ORDER — LORAZEPAM 0.5 MG/1
0.5 TABLET ORAL 2 TIMES DAILY PRN
Qty: 30 TABLET | Refills: 0 | Status: SHIPPED | OUTPATIENT
Start: 2021-10-12 | End: 2022-10-25 | Stop reason: SDUPTHER

## 2021-10-12 RX ORDER — BLOOD SUGAR DIAGNOSTIC
1 STRIP MISCELLANEOUS 3 TIMES DAILY
Qty: 100 STRIP | Refills: 5 | Status: SHIPPED | OUTPATIENT
Start: 2021-10-12 | End: 2022-04-19 | Stop reason: SDUPTHER

## 2021-10-12 ASSESSMENT — LIFESTYLE VARIABLES: HOW OFTEN DO YOU HAVE A DRINK CONTAINING ALCOHOL: 0

## 2021-10-12 ASSESSMENT — PATIENT HEALTH QUESTIONNAIRE - PHQ9
2. FEELING DOWN, DEPRESSED OR HOPELESS: 0
1. LITTLE INTEREST OR PLEASURE IN DOING THINGS: 0
SUM OF ALL RESPONSES TO PHQ QUESTIONS 1-9: 0
SUM OF ALL RESPONSES TO PHQ QUESTIONS 1-9: 0
SUM OF ALL RESPONSES TO PHQ9 QUESTIONS 1 & 2: 0
SUM OF ALL RESPONSES TO PHQ QUESTIONS 1-9: 0

## 2021-10-12 NOTE — PROGRESS NOTES
Medicare Annual Wellness Visit  Name: Jeff Agent Date: 10/12/2021   MRN: [id number] Sex: Male   Age: [de-identified] y.o. Ethnicity: Non- / Non    : 1940 Race: White (non-)      Mari Ibanez is here for Medicare AWV    Screenings for behavioral, psychosocial and functional/safety risks, and cognitive dysfunction are all negative except as indicated below. These results, as well as other patient data from the [address] form, are documented in Flowsheets linked to this Encounter. Hypertension: Stable. Denies CP, SOB, cough, visual changes, dizziness, palpitations or HA.      takotsubo cardiomyopathy, stable and cont f/u Dr George Deshpande    DM- cont improvement in a1c and today POCT down to 7.2. Is on jardiance  Lab Results   Component Value Date    LABA1C 7.5 2021    LABA1C 8.1 2021    LABA1C 8.3 10/22/2020     Lab Results   Component Value Date    LABMICR 2.00 (H) 2021    LDLCALC 75 2021    CREATININE 1.1 2021     Mulugeta lino, seeing Dr Rajesh Farrar and for periodic injections. Colon ca, had recent colonoscopy DR Khan - recheck . Atherosclerosis, on statin and w good DM control. Anxiety, stable on ativan prn w rf due. Controlled substances monitoring: possible medication side effects, risk of tolerance and/or dependence, and alternative treatments discussed, no signs of potential drug abuse or diversion identified and OARRS report reviewed today- activity consistent with treatment plan. Allergies   Allergen Reactions    Atenolol Other (See Comments)     DIZZINESS, SWEATY- INCR ANXIETY AND INSOMNIA    Crestor [Rosuvastatin Calcium]      Cannot take everyday upsets stomach    Lovastatin      Can not tolerate 10 mg but can tolerate 5 mg       Prior to Visit Medications    Medication Sig Taking?  Authorizing Provider   hydroCHLOROthiazide (HYDRODIURIL) 25 MG tablet TAKE 1/2 TABLET BY MOUTH EVERY DAY Yes Merced Tran MD metFORMIN (GLUCOPHAGE) 500 MG tablet TAKE 1 TABLET BY MOUTH TWICE A DAY Yes Steven Schmitt MD   lisinopril (PRINIVIL;ZESTRIL) 40 MG tablet TAKE 1 TABLET BY MOUTH EVERY DAY Yes Steven Schmitt MD   glipiZIDE (GLUCOTROL XL) 5 MG extended release tablet TAKE 2 TABS IN THE MORNING AND 1 TAB AT BEDTIME Yes Steven Schmitt MD   amLODIPine (NORVASC) 5 MG tablet Take 1 tablet by mouth daily Yes Steven Schmitt MD   empagliflozin (JARDIANCE) 10 MG tablet Take 1 tablet by mouth daily Yes Steven Schmitt MD   ondansetron (ZOFRAN) 4 MG tablet Take 1 tablet by mouth 3 times daily as needed for Nausea or Vomiting Yes Steven Schmitt MD   ONETOUCH ULTRA strip TEST 3 TIMES A DAY Yes Steven Schmitt MD   rosuvastatin (CRESTOR) 10 MG tablet Take 1 tablet by mouth daily Yes Steven Schmitt MD   aspirin EC 81 MG EC tablet Take 81 mg by mouth daily Yes Historical Provider, MD   hydrALAZINE (APRESOLINE) 10 MG tablet Take 1 tablet by mouth 2 times daily as needed (TAKE FOR SYSTOLIC BP >421 AND ALSO CAN TAKE 1 HOUR PRIOR TO ANY PHYSICIAN APPOINTMENTS.)  Steven Schmitt MD       Past Medical History:   Diagnosis Date    Adenocarcinoma of colon [facility]) 2005    Dr Luis Gamble 8/24/21- colonoscopy, recheck 3 yrs    Age-related macular degeneration, wet, right eye ([facility] [address] 02/2017    Dr Danay Cagle starting shots    Anxiety     ON PRN ATIVAN ~30 TABS Q 3MO INTERVAL    Atherosclerosis of aorta ([facility] [address]     noted on CXR 2012- no aneurysm on u/s- 3/2017    Depression     DM II (diabetes mellitus, type II), controlled ([address]     Family history of coronary artery disease     History of cardiac cath 12/09/2017    Mid LAD has 20% stenosis.  Start beta blockers and ace     History of echocardiogram 12/09/2017    EF 30-35%     Hx of Doppler echocardiogram 03/06/2018    Limited-EF50-60%,normal    Hyperlipidemia     Hypertension     WITH STRONG COMPONENT OF WHITE COAT SYNDROME-- HIGH BPS W PHYSICIAN APPTS--- TRYING PRN edema  Musculoskeletal: normal range of motion, no joint swelling, deformity or tenderness  Neurologic:  no cranial nerve deficit, gait, coordination and speech normal    Patient's complete Health Risk Assessment and screening values have been reviewed and are found in Flowsheets. The following problems were reviewed today and where indicated follow up appointments were made and/or referrals ordered. Positive Risk Factor Screenings with Interventions:          General Health and ACP:  General  In general, how would you say your health is?: Good  In the past 7 days, have you experienced any of the following? New or Increased Pain, New or Increased Fatigue, Loneliness, Social Isolation, Stress or Anger?: None of These  Do you get the social and emotional support that you need?: Yes  Do you have a Living Will?: Yes  Advance Directives     Power of [address] [place] [address] Will ACP-Advance Directive ACP-Power of     Not on File Not on File Not on File Not on File      General Health Risk Interventions:  · no issues above    Health Habits/Nutrition:  Health Habits/Nutrition  Do you exercise for at least 20 minutes 2-3 times per week?: (!) No  Have you lost any weight without trying in the past 3 months?: No  Do you eat only one meal per day?: No  Have you seen the dentist within the past year?: Appointment is scheduled  Body mass index: (!) 26.15  Health Habits/Nutrition Interventions:  · encouraged exercise and also we'll monitor wt, cont DM diet.        Personalized Preventive Plan   Current Health Maintenance Status  Immunization History   Administered Date(s) Administered    COVID-19, Moderna, PF, 100mcg/0.5mL 02/17/2021, 03/17/2021    Influenza, High Dose (Fluzone 65 yrs and older) 11/12/2014, 11/17/2015, 11/15/2016, 08/14/2017, 08/21/2018, 09/10/2019    Influenza, Quadv, adjuvanted, 65 yrs +, IM, PF (Fluad) 09/14/2020, 10/12/2021    Pneumococcal Conjugate 13-valent ([id number]) 02/14/2017    Pneumococcal Polysaccharide ([id number]) 05/15/2018        Health Maintenance   Topic Date Due    DTaP/Tdap/Td vaccine (1 - Tdap) Never done    Shingles Vaccine (1 of 2) Never done    Diabetic foot exam  09/10/2020    Annual Wellness Visit (AWV)  09/15/2021    A1C test (Diabetic or Prediabetic)  02/16/2022    Diabetic retinal exam  06/24/2022    Lipid screen  08/16/2022    Potassium monitoring  08/16/2022    Creatinine monitoring  08/16/2022    Flu vaccine  Completed    Pneumococcal 65+ years Vaccine  Completed    COVID-19 Vaccine  Completed    Hepatitis A vaccine  Aged Out    Hib vaccine  Aged Out    Meningococcal (ACWY) vaccine  Aged Out     Recommendations for [employer] Due: see orders and patient instructions/AVS.  . Recommended screening schedule for the next 5-10 years is provided to the patient in written form: see Patient Ángel Cordova was seen today for medicare awv. Diagnoses and all orders for this visit:    Routine general medical examination at a health care facility    Controlled type 2 diabetes mellitus without complication, without long-term current use of insulin ([address]- great response to jardiance, continue present management. Will monitor.    -     blood glucose test strips (ONETOUCH ULTRA) strip; 1 each by Other route 3 times daily As needed. -     Comprehensive Metabolic Panel; Future  -     CBC Auto Differential; Future  -     Lipid Panel; Future  -     Hemoglobin A1C; Future  -     Microalbumin / Creatinine Urine Ratio; Future    Anxiety - cont rx prn  -     LORazepam (ATIVAN) 0.5 MG tablet; Take 1 tablet by mouth 2 times daily as needed for Anxiety for up to 30 days.     Need for influenza vaccination  -     INFLUENZA, QUADV, ADJUVANTED, 72 YRS =, IM, PF, PREFILL SYR, 0.5ML (FLUAD)    Exudative age-related macular degeneration of right eye, unspecified stage ([address]- for cont monitoring optho    Takotsubo cardiomyopathy- Pt clinically w/o evidence of cardiovascular instability, cont regimen. Primary hypertension - Blood pressure stable and will continue current regimen. Will plan periodic monitoring of renal function, electrolytes, lipid profile. -     Comprehensive Metabolic Panel; Future  -     CBC Auto Differential; Future  -     Lipid Panel; Future    Adenocarcinoma of colon [facility])- for cont periodic surveillance, we'll call for the pathology rept of bx  -     Comprehensive Metabolic Panel; Future  -     CBC Auto Differential; Future    Atherosclerosis of aorta (HCC)- cont regimen and f/u lab, on statin  -     Comprehensive Metabolic Panel; Future  -     CBC Auto Differential; Future  -     Lipid Panel;  Future

## 2021-10-12 NOTE — PATIENT INSTRUCTIONS
Personalized Preventive Plan for Tariq Minor - 10/12/2021  Medicare offers a range of preventive health benefits. Some of the tests and screenings are paid in full while other may be subject to a deductible, co-insurance, and/or copay. Some of these benefits include a comprehensive review of your medical history including lifestyle, illnesses that may run in your family, and various assessments and screenings as appropriate. After reviewing your medical record and screening and assessments performed today your provider may have ordered immunizations, labs, imaging, and/or referrals for you. A list of these orders (if applicable) as well as your Preventive Care list are included within your After Visit Summary for your review. Other Preventive Recommendations:    · A preventive eye exam performed by an eye specialist is recommended every 1-2 years to screen for glaucoma; cataracts, macular degeneration, and other eye disorders. · A preventive dental visit is recommended every 6 months. · Try to get at least 150 minutes of exercise per week or 10,000 steps per day on a pedometer . · Order or download the FREE \"Exercise & Physical Activity: Your Everyday Guide\" from [employer]. Call 6-855.121.3813 or search [employer] online. · You need 1143-5784 mg of calcium and 1958-9895 IU of vitamin D per day. It is possible to meet your calcium requirement with diet alone, but a vitamin D supplement is usually necessary to meet this goal.  · When exposed to the sun, use a sunscreen that protects against both UVA and UVB radiation with an SPF of 30 or greater. Reapply every 2 to 3 hours or after sweating, drying off with a towel, or swimming. · Always wear a seat belt when traveling in a car. Always wear a helmet when riding a bicycle or motorcycle.

## 2021-12-13 DIAGNOSIS — I10 ESSENTIAL HYPERTENSION: ICD-10-CM

## 2021-12-13 DIAGNOSIS — E11.9 CONTROLLED TYPE 2 DIABETES MELLITUS WITHOUT COMPLICATION, WITHOUT LONG-TERM CURRENT USE OF INSULIN (HCC): ICD-10-CM

## 2021-12-13 RX ORDER — AMLODIPINE BESYLATE 5 MG/1
TABLET ORAL
Qty: 90 TABLET | Refills: 1 | Status: SHIPPED | OUTPATIENT
Start: 2021-12-13 | End: 2022-04-19 | Stop reason: SDUPTHER

## 2021-12-13 RX ORDER — EMPAGLIFLOZIN 10 MG/1
TABLET, FILM COATED ORAL
Qty: 90 TABLET | Refills: 1 | Status: SHIPPED | OUTPATIENT
Start: 2021-12-13 | End: 2022-07-05

## 2022-01-11 ENCOUNTER — OFFICE VISIT (OUTPATIENT)
Dept: INTERNAL MEDICINE CLINIC | Age: 82
End: 2022-01-11
Payer: MEDICARE

## 2022-01-11 VITALS
WEIGHT: 175 LBS | DIASTOLIC BLOOD PRESSURE: 70 MMHG | BODY MASS INDEX: 26.61 KG/M2 | SYSTOLIC BLOOD PRESSURE: 124 MMHG | OXYGEN SATURATION: 96 % | HEART RATE: 83 BPM | RESPIRATION RATE: 14 BRPM

## 2022-01-11 DIAGNOSIS — H35.3210 EXUDATIVE AGE-RELATED MACULAR DEGENERATION OF RIGHT EYE, UNSPECIFIED STAGE (HCC): ICD-10-CM

## 2022-01-11 DIAGNOSIS — B35.1 ONYCHOMYCOSIS: ICD-10-CM

## 2022-01-11 DIAGNOSIS — I70.0 ATHEROSCLEROSIS OF AORTA (HCC): ICD-10-CM

## 2022-01-11 DIAGNOSIS — E11.9 CONTROLLED TYPE 2 DIABETES MELLITUS WITHOUT COMPLICATION, WITHOUT LONG-TERM CURRENT USE OF INSULIN (HCC): Primary | ICD-10-CM

## 2022-01-11 DIAGNOSIS — I51.81 TAKOTSUBO CARDIOMYOPATHY: ICD-10-CM

## 2022-01-11 DIAGNOSIS — C18.9 ADENOCARCINOMA OF COLON (HCC): ICD-10-CM

## 2022-01-11 DIAGNOSIS — I10 PRIMARY HYPERTENSION: ICD-10-CM

## 2022-01-11 PROCEDURE — 4040F PNEUMOC VAC/ADMIN/RCVD: CPT | Performed by: INTERNAL MEDICINE

## 2022-01-11 PROCEDURE — G8484 FLU IMMUNIZE NO ADMIN: HCPCS | Performed by: INTERNAL MEDICINE

## 2022-01-11 PROCEDURE — G8427 DOCREV CUR MEDS BY ELIG CLIN: HCPCS | Performed by: INTERNAL MEDICINE

## 2022-01-11 PROCEDURE — 99214 OFFICE O/P EST MOD 30 MIN: CPT | Performed by: INTERNAL MEDICINE

## 2022-01-11 PROCEDURE — G8417 CALC BMI ABV UP PARAM F/U: HCPCS | Performed by: INTERNAL MEDICINE

## 2022-01-11 PROCEDURE — 1036F TOBACCO NON-USER: CPT | Performed by: INTERNAL MEDICINE

## 2022-01-11 PROCEDURE — 1123F ACP DISCUSS/DSCN MKR DOCD: CPT | Performed by: INTERNAL MEDICINE

## 2022-01-11 RX ORDER — KETOCONAZOLE 20 MG/G
CREAM TOPICAL
Qty: 30 G | Refills: 1 | Status: SHIPPED | OUTPATIENT
Start: 2022-01-11

## 2022-01-11 NOTE — PROGRESS NOTES
Myra Plant  1940 01/11/22    SUBJECTIVE:    DM- last a1c improving and steady on diet, med compliance. Lab Results   Component Value Date    LABA1C 7.5 08/16/2021    LABA1C 8.1 05/13/2021    LABA1C 8.3 10/22/2020     Lab Results   Component Value Date    LABMICR 2.00 (H) 05/13/2021    [address] 08/16/2021    CREATININE 1.1 08/16/2021     takotsubo cardiomyopathy, no sx cp or sob, bp stable and cont f/u Dr Lobito Madrigal. Hypertension: Stable. Denies CP, SOB, cough, visual changes, dizziness, palpitations or FELTON. Mulugeta lino, has shots in both eyes and also seeing Dr Steven Thomas later this week. R vision is worse    Colon CA- had colonoscopy last yr and next due 2024 Dr Castro Jameson    Aortic atherosclerosis noted. No leg claudication at this tijme. Has significant onychomycosis, defers podiatry, asked to try a cream.  We may conside roral such as lamisil later. OBJECTIVE:    /70   Pulse 83   Resp 14   Wt 175 lb (79.4 kg)   SpO2 96%   BMI 26.61 kg/m²     Physical Exam  Vitals reviewed. Constitutional:       Appearance: He is well-developed. Cardiovascular:      Rate and Rhythm: Normal rate and regular rhythm. Heart sounds: Normal heart sounds. No murmur heard. No friction rub. No gallop. Pulmonary:      Effort: Pulmonary effort is normal. No respiratory distress. Breath sounds: Normal breath sounds. No wheezing or rales. Abdominal:      General: Bowel sounds are normal. There is no distension. Palpations: Abdomen is soft. Tenderness: There is no abdominal tenderness. Musculoskeletal:      Cervical back: Neck supple.    Skin:     Comments: FOOT EXAM-- SOME DIFFUSE ONYCHOMYCOSIS NOTED TOES  Visual inspection:  Deformity/amputation: absent  Skin lesions/pre-ulcerative calluses: absent  Edema: right- negative, left- negative    Sensory exam:  Monofilament sensation: normal  (minimum of 5 random plantar locations tested, avoiding callused areas - > 1 area with absence of sensation is + for neuropathy)      Pulses: normal       Neurological:      Mental Status: He is alert. ASSESSMENT:    1. Controlled type 2 diabetes mellitus without complication, without long-term current use of insulin ([address]    2. Exudative age-related macular degeneration of right eye, unspecified stage ([address]    3. Adenocarcinoma of colon ([address]    4. Atherosclerosis of aorta ([address]    5. Onychomycosis    6. Primary hypertension    7. Takotsubo cardiomyopathy        PLAN:    Slime Chavez was seen today for diabetes. Diagnoses and all orders for this visit:    Controlled type 2 diabetes mellitus without complication, without long-term current use of insulin ([address] - DM relatively well controlled and will continue current regimen. Screening reviewed. See orders. -     Comprehensive Metabolic Panel; Future  -     CBC Auto Differential; Future  -     Lipid Panel; Future  -     Hemoglobin A1C; Future  -     Microalbumin / Creatinine Urine Ratio; Future  -     HM DIABETES FOOT EXAM    Exudative age-related macular degeneration of right eye, unspecified stage (HCC) FOR CONT F/U OPTHO    Adenocarcinoma of colon (HCC)- CONT SURVEILLANCE COLONOSCOPY    Atherosclerosis of aorta (HCC)- NO LEG CLAUDICATION SX, FU LAB AND CONT MEDS  -     Comprehensive Metabolic Panel; Future  -     CBC Auto Differential; Future  -     Lipid Panel; Future    Onychomycosis- TRIAL TOPICAL, HE DEFERS ORAL MEDS OR PODIATRY EVAL AT THIS TIME  -     ketoconazole (NIZORAL) 2 % cream; Apply topically daily. Primary hypertension - Blood pressure stable and will continue current regimen. Will plan periodic monitoring of renal function, electrolytes, lipid profile. Takotsubo cardiomyopathy - Pt clinically w/o evidence of cardiovascular instability, cont regimen. FOR CONT F/U DR REYNOLDS  -     Comprehensive Metabolic Panel; Future  -     CBC Auto Differential; Future  -     Lipid Panel;  Future

## 2022-02-15 ENCOUNTER — OFFICE VISIT (OUTPATIENT)
Dept: CARDIOLOGY CLINIC | Age: 82
End: 2022-02-15
Payer: MEDICARE

## 2022-02-15 VITALS
WEIGHT: 179.2 LBS | OXYGEN SATURATION: 98 % | SYSTOLIC BLOOD PRESSURE: 130 MMHG | BODY MASS INDEX: 27.16 KG/M2 | DIASTOLIC BLOOD PRESSURE: 86 MMHG | HEART RATE: 89 BPM | HEIGHT: 68 IN

## 2022-02-15 DIAGNOSIS — I10 PRIMARY HYPERTENSION: ICD-10-CM

## 2022-02-15 DIAGNOSIS — F41.9 ANXIETY: ICD-10-CM

## 2022-02-15 DIAGNOSIS — I70.0 ATHEROSCLEROSIS OF AORTA (HCC): ICD-10-CM

## 2022-02-15 DIAGNOSIS — I51.81 TAKOTSUBO CARDIOMYOPATHY: ICD-10-CM

## 2022-02-15 DIAGNOSIS — E78.2 MIXED HYPERLIPIDEMIA: Primary | ICD-10-CM

## 2022-02-15 PROCEDURE — G8417 CALC BMI ABV UP PARAM F/U: HCPCS | Performed by: INTERNAL MEDICINE

## 2022-02-15 PROCEDURE — 1123F ACP DISCUSS/DSCN MKR DOCD: CPT | Performed by: INTERNAL MEDICINE

## 2022-02-15 PROCEDURE — 99214 OFFICE O/P EST MOD 30 MIN: CPT | Performed by: INTERNAL MEDICINE

## 2022-02-15 PROCEDURE — 4040F PNEUMOC VAC/ADMIN/RCVD: CPT | Performed by: INTERNAL MEDICINE

## 2022-02-15 PROCEDURE — G8427 DOCREV CUR MEDS BY ELIG CLIN: HCPCS | Performed by: INTERNAL MEDICINE

## 2022-02-15 PROCEDURE — G8484 FLU IMMUNIZE NO ADMIN: HCPCS | Performed by: INTERNAL MEDICINE

## 2022-02-15 PROCEDURE — 1036F TOBACCO NON-USER: CPT | Performed by: INTERNAL MEDICINE
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
## 2022-02-15 NOTE — PATIENT INSTRUCTIONS
**It is YOUR responsibilty to bring medication bottles and/or updated medication list to [address] [place] [address]. This will allow us to better serve you and all your healthcare needs**    [facility] Locations - No appointment necessary. Sites open Monday to Friday. Call your preferred location for test preparation, business hours and other information you need. [facility] accepts [employer]. [facility] [address] Zana Serrano, [address] [facility]  Phone: [phone] [facility] [address] [facility]  [address].   Kenzie Naranjo, [address] [place] [address]  Phone: [phone]

## 2022-03-06 DIAGNOSIS — E11.9 CONTROLLED TYPE 2 DIABETES MELLITUS WITHOUT COMPLICATION, WITHOUT LONG-TERM CURRENT USE OF INSULIN (HCC): ICD-10-CM

## 2022-03-06 DIAGNOSIS — I10 ESSENTIAL HYPERTENSION: ICD-10-CM

## 2022-03-07 RX ORDER — LISINOPRIL 40 MG/1
TABLET ORAL
Qty: 90 TABLET | Refills: 1 | Status: SHIPPED | OUTPATIENT
Start: 2022-03-07 | End: 2022-04-19 | Stop reason: SDUPTHER

## 2022-04-11 DIAGNOSIS — I70.0 ATHEROSCLEROSIS OF AORTA (HCC): ICD-10-CM

## 2022-04-11 DIAGNOSIS — I51.81 TAKOTSUBO CARDIOMYOPATHY: ICD-10-CM

## 2022-04-11 DIAGNOSIS — E11.9 CONTROLLED TYPE 2 DIABETES MELLITUS WITHOUT COMPLICATION, WITHOUT LONG-TERM CURRENT USE OF INSULIN (HCC): ICD-10-CM

## 2022-04-11 LAB
A/G RATIO: 2.4 (ref 1.1–2.2)
ALBUMIN SERPL-MCNC: 4.6 G/DL (ref 3.4–5)
ALP BLD-CCNC: 73 U/L (ref 40–129)
ALT SERPL-CCNC: 15 U/L (ref 10–40)
ANION GAP SERPL CALCULATED.3IONS-SCNC: 14 MMOL/L (ref 3–16)
AST SERPL-CCNC: 19 U/L (ref 15–37)
BASOPHILS ABSOLUTE: 0 K/UL (ref 0–0.2)
BASOPHILS RELATIVE PERCENT: 0.5 %
BILIRUB SERPL-MCNC: 0.6 MG/DL (ref 0–1)
BUN BLDV-MCNC: 26 MG/DL (ref 7–20)
CALCIUM SERPL-MCNC: 9.3 MG/DL (ref 8.3–10.6)
CHLORIDE BLD-SCNC: 100 MMOL/L (ref 99–110)
CHOLESTEROL, TOTAL: 182 MG/DL (ref 0–199)
CO2: 25 MMOL/L (ref 21–32)
CREAT SERPL-MCNC: 1.4 MG/DL (ref 0.8–1.3)
CREATININE URINE: 101.1 MG/DL (ref 39–259)
EOSINOPHILS ABSOLUTE: 0.2 K/UL (ref 0–0.6)
EOSINOPHILS RELATIVE PERCENT: 7.2 %
GFR AFRICAN AMERICAN: 59
GFR NON-AFRICAN AMERICAN: 49
GLUCOSE BLD-MCNC: 170 MG/DL (ref 70–99)
HCT VFR BLD CALC: 46 % (ref 40.5–52.5)
HDLC SERPL-MCNC: 59 MG/DL (ref 40–60)
HEMOGLOBIN: 15.6 G/DL (ref 13.5–17.5)
LDL CHOLESTEROL CALCULATED: 103 MG/DL
LYMPHOCYTES ABSOLUTE: 1.2 K/UL (ref 1–5.1)
LYMPHOCYTES RELATIVE PERCENT: 41.9 %
MCH RBC QN AUTO: 32.8 PG (ref 26–34)
MCHC RBC AUTO-ENTMCNC: 33.9 G/DL (ref 31–36)
MCV RBC AUTO: 96.7 FL (ref 80–100)
MICROALBUMIN UR-MCNC: <1.2 MG/DL
MICROALBUMIN/CREAT UR-RTO: NORMAL MG/G (ref 0–30)
MONOCYTES ABSOLUTE: 0.3 K/UL (ref 0–1.3)
MONOCYTES RELATIVE PERCENT: 10.6 %
NEUTROPHILS ABSOLUTE: 1.1 K/UL (ref 1.7–7.7)
NEUTROPHILS RELATIVE PERCENT: 39.8 %
PDW BLD-RTO: 13.6 % (ref 12.4–15.4)
PLATELET # BLD: 149 K/UL (ref 135–450)
PMV BLD AUTO: 10.2 FL (ref 5–10.5)
POTASSIUM SERPL-SCNC: 4.4 MMOL/L (ref 3.5–5.1)
RBC # BLD: 4.75 M/UL (ref 4.2–5.9)
SODIUM BLD-SCNC: 139 MMOL/L (ref 136–145)
TOTAL PROTEIN: 6.5 G/DL (ref 6.4–8.2)
TRIGL SERPL-MCNC: 101 MG/DL (ref 0–150)
VLDLC SERPL CALC-MCNC: 20 MG/DL
WBC # BLD: 2.8 K/UL (ref 4–11)

## 2022-04-11 PROCEDURE — 36415 COLL VENOUS BLD VENIPUNCTURE: CPT | Performed by: INTERNAL MEDICINE

## 2022-04-12 DIAGNOSIS — D72.819 LEUKOPENIA, UNSPECIFIED TYPE: Primary | ICD-10-CM

## 2022-04-12 PROBLEM — D70.8 OTHER NEUTROPENIA (HCC): Status: ACTIVE | Noted: 2022-04-12

## 2022-04-12 LAB
ESTIMATED AVERAGE GLUCOSE: 208.7 MG/DL
HBA1C MFR BLD: 8.9 %

## 2022-04-12 NOTE — RESULT ENCOUNTER NOTE
Call pt, labs ok/chol ok- except FOR TWO ISSUES, FIRST SUGARS ARE SIGNIFICANTLY HIGHER W A1C RISING FROM 7.5--8.9--- SUGGEST WE INCR HIS METFORMIN FROM 500MG BID TO 1000MG BID,- Place med changes/corrections on EPIC medlist please   SECOND HIS WHITE COUNT ON BLOOD COUNTS IS LOWER AND WE MAY NEED TO CONSIDER EVALUATION FROM A BLOOD SPECIALIST, HEMATOLOGIST-  OK W ED IF WE ALSO REFER TO DR Debra Hidalgo?

## 2022-04-19 ENCOUNTER — OFFICE VISIT (OUTPATIENT)
Dept: INTERNAL MEDICINE CLINIC | Age: 82
End: 2022-04-19
Payer: MEDICARE

## 2022-04-19 VITALS
RESPIRATION RATE: 16 BRPM | SYSTOLIC BLOOD PRESSURE: 116 MMHG | OXYGEN SATURATION: 98 % | HEIGHT: 68 IN | HEART RATE: 86 BPM | BODY MASS INDEX: 26.58 KG/M2 | DIASTOLIC BLOOD PRESSURE: 70 MMHG | WEIGHT: 175.38 LBS

## 2022-04-19 DIAGNOSIS — D70.8 OTHER NEUTROPENIA (HCC): ICD-10-CM

## 2022-04-19 DIAGNOSIS — I10 ESSENTIAL HYPERTENSION: ICD-10-CM

## 2022-04-19 DIAGNOSIS — I51.81 TAKOTSUBO CARDIOMYOPATHY: ICD-10-CM

## 2022-04-19 DIAGNOSIS — E78.2 MIXED HYPERLIPIDEMIA: ICD-10-CM

## 2022-04-19 DIAGNOSIS — E11.9 CONTROLLED TYPE 2 DIABETES MELLITUS WITHOUT COMPLICATION, WITHOUT LONG-TERM CURRENT USE OF INSULIN (HCC): Primary | ICD-10-CM

## 2022-04-19 LAB — HBA1C MFR BLD: 8.2 %

## 2022-04-19 PROCEDURE — G8417 CALC BMI ABV UP PARAM F/U: HCPCS | Performed by: INTERNAL MEDICINE

## 2022-04-19 PROCEDURE — 99214 OFFICE O/P EST MOD 30 MIN: CPT | Performed by: INTERNAL MEDICINE

## 2022-04-19 PROCEDURE — 83036 HEMOGLOBIN GLYCOSYLATED A1C: CPT | Performed by: INTERNAL MEDICINE

## 2022-04-19 PROCEDURE — 3052F HG A1C>EQUAL 8.0%<EQUAL 9.0%: CPT | Performed by: INTERNAL MEDICINE

## 2022-04-19 PROCEDURE — 1123F ACP DISCUSS/DSCN MKR DOCD: CPT | Performed by: INTERNAL MEDICINE

## 2022-04-19 PROCEDURE — 1036F TOBACCO NON-USER: CPT | Performed by: INTERNAL MEDICINE

## 2022-04-19 PROCEDURE — G8427 DOCREV CUR MEDS BY ELIG CLIN: HCPCS | Performed by: INTERNAL MEDICINE

## 2022-04-19 PROCEDURE — 93000 ELECTROCARDIOGRAM COMPLETE: CPT | Performed by: INTERNAL MEDICINE

## 2022-04-19 PROCEDURE — 4040F PNEUMOC VAC/ADMIN/RCVD: CPT | Performed by: INTERNAL MEDICINE

## 2022-04-19 RX ORDER — HYDROCHLOROTHIAZIDE 25 MG/1
TABLET ORAL
Qty: 45 TABLET | Refills: 1 | Status: SHIPPED | OUTPATIENT
Start: 2022-04-19 | End: 2022-09-09

## 2022-04-19 RX ORDER — LISINOPRIL 40 MG/1
TABLET ORAL
Qty: 90 TABLET | Refills: 1 | Status: SHIPPED | OUTPATIENT
Start: 2022-04-19 | End: 2022-09-09

## 2022-04-19 RX ORDER — AMLODIPINE BESYLATE 5 MG/1
TABLET ORAL
Qty: 90 TABLET | Refills: 1 | Status: SHIPPED | OUTPATIENT
Start: 2022-04-19 | End: 2022-10-26

## 2022-04-19 RX ORDER — BLOOD SUGAR DIAGNOSTIC
1 STRIP MISCELLANEOUS 3 TIMES DAILY
Qty: 100 STRIP | Refills: 5 | Status: SHIPPED | OUTPATIENT
Start: 2022-04-19

## 2022-04-19 RX ORDER — ROSUVASTATIN CALCIUM 10 MG/1
10 TABLET, COATED ORAL DAILY
Qty: 90 TABLET | Refills: 1 | Status: SHIPPED | OUTPATIENT
Start: 2022-04-19 | End: 2022-10-25 | Stop reason: SDUPTHER

## 2022-04-19 RX ORDER — HYDRALAZINE HYDROCHLORIDE 10 MG/1
10 TABLET, FILM COATED ORAL 2 TIMES DAILY PRN
Qty: 60 TABLET | Refills: 3 | Status: SHIPPED | OUTPATIENT
Start: 2022-04-19 | End: 2023-04-19

## 2022-04-19 SDOH — ECONOMIC STABILITY: HOUSING INSECURITY
IN THE LAST 12 MONTHS, WAS THERE A TIME WHEN YOU DID NOT HAVE A STEADY PLACE TO SLEEP OR SLEPT IN A SHELTER (INCLUDING NOW)?: NO

## 2022-04-19 SDOH — ECONOMIC STABILITY: HOUSING INSECURITY: IN THE LAST 12 MONTHS, HOW MANY PLACES HAVE YOU LIVED?: 1

## 2022-04-19 SDOH — ECONOMIC STABILITY: FOOD INSECURITY: WITHIN THE PAST 12 MONTHS, YOU WORRIED THAT YOUR FOOD WOULD RUN OUT BEFORE YOU GOT MONEY TO BUY MORE.: NEVER TRUE

## 2022-04-19 SDOH — HEALTH STABILITY: PHYSICAL HEALTH: ON AVERAGE, HOW MANY MINUTES DO YOU ENGAGE IN EXERCISE AT THIS LEVEL?: 30 MIN

## 2022-04-19 SDOH — HEALTH STABILITY: PHYSICAL HEALTH: ON AVERAGE, HOW MANY DAYS PER WEEK DO YOU ENGAGE IN MODERATE TO STRENUOUS EXERCISE (LIKE A BRISK WALK)?: 4 DAYS

## 2022-04-19 SDOH — ECONOMIC STABILITY: INCOME INSECURITY: IN THE LAST 12 MONTHS, WAS THERE A TIME WHEN YOU WERE NOT ABLE TO PAY THE MORTGAGE OR RENT ON TIME?: NO

## 2022-04-19 SDOH — ECONOMIC STABILITY: FOOD INSECURITY: WITHIN THE PAST 12 MONTHS, THE FOOD YOU BOUGHT JUST DIDN'T LAST AND YOU DIDN'T HAVE MONEY TO GET MORE.: NEVER TRUE

## 2022-04-19 SDOH — ECONOMIC STABILITY: TRANSPORTATION INSECURITY
IN THE PAST 12 MONTHS, HAS LACK OF TRANSPORTATION KEPT YOU FROM MEETINGS, WORK, OR FROM GETTING THINGS NEEDED FOR DAILY LIVING?: NO

## 2022-04-19 SDOH — ECONOMIC STABILITY: TRANSPORTATION INSECURITY
IN THE PAST 12 MONTHS, HAS THE LACK OF TRANSPORTATION KEPT YOU FROM MEDICAL APPOINTMENTS OR FROM GETTING MEDICATIONS?: NO

## 2022-04-19 ASSESSMENT — SOCIAL DETERMINANTS OF HEALTH (SDOH)
WITHIN THE LAST YEAR, HAVE YOU BEEN KICKED, HIT, SLAPPED, OR OTHERWISE PHYSICALLY HURT BY YOUR PARTNER OR EX-PARTNER?: NO
WITHIN THE LAST YEAR, HAVE TO BEEN RAPED OR FORCED TO HAVE ANY KIND OF SEXUAL ACTIVITY BY YOUR PARTNER OR EX-PARTNER?: NO
WITHIN THE LAST YEAR, HAVE YOU BEEN HUMILIATED OR EMOTIONALLY ABUSED IN OTHER WAYS BY YOUR PARTNER OR EX-PARTNER?: NO
WITHIN THE LAST YEAR, HAVE YOU BEEN AFRAID OF YOUR PARTNER OR EX-PARTNER?: NO
HOW HARD IS IT FOR YOU TO PAY FOR THE VERY BASICS LIKE FOOD, HOUSING, MEDICAL CARE, AND HEATING?: NOT HARD AT ALL

## 2022-04-19 ASSESSMENT — LIFESTYLE VARIABLES
HOW OFTEN DO YOU HAVE A DRINK CONTAINING ALCOHOL: NEVER
HOW MANY STANDARD DRINKS CONTAINING ALCOHOL DO YOU HAVE ON A TYPICAL DAY: 1 OR 2

## 2022-04-19 NOTE — PROGRESS NOTES
Edwin Weston  1940 04/19/22    SUBJECTIVE:  DM-  Recent a1c much higher but no change in meds, also has been eating a little more but wt is actually down. **recheck was better today poct at 8.2**  Lab Results   Component Value Date    LABA1C 8.9 04/11/2022    LABA1C 7.5 08/16/2021    LABA1C 8.1 05/13/2021     Lab Results   Component Value Date    LABMICR <1.20 04/11/2022    LDLCALC 103 (H) 04/11/2022    CREATININE 1.4 (H) 04/11/2022     Hypertension: Stable. Denies CP, SOB, cough, visual changes, dizziness, palpitations or HA. -- SL; IRREG HR TODAY' WE'LL RECHECK EKG    takotsubo CMP, seeing Dr Lolis Burns and no sx cp as noted. Colon CA, last colonoscopy Dr Jl Wills was 2021 so recheck due 2024. Low wbc, is to also see Dr Janet Haney for May    OBJECTIVE:    /70   Pulse 86   Resp 16   Ht 5' 8\" (1.727 m)   Wt 175 lb 6 oz (79.5 kg)   SpO2 98%   BMI 26.67 kg/m²     Physical Exam  Vitals reviewed. Constitutional:       General: He is not in acute distress. Appearance: He is well-developed. HENT:      Head: Normocephalic and atraumatic. Eyes:      General: No scleral icterus. Right eye: No discharge. Left eye: No discharge. Conjunctiva/sclera: Conjunctivae normal.      Pupils: Pupils are equal, round, and reactive to light. Neck:      Thyroid: No thyromegaly. Vascular: No JVD. Trachea: No tracheal deviation. Cardiovascular:      Rate and Rhythm: Normal rate and regular rhythm. Heart sounds: Normal heart sounds. No murmur heard. No friction rub. No gallop. Pulmonary:      Effort: Pulmonary effort is normal. No respiratory distress. Breath sounds: Normal breath sounds. No wheezing or rales. Abdominal:      General: Bowel sounds are normal. There is no distension. Palpations: Abdomen is soft. There is no mass. Tenderness: There is no abdominal tenderness. There is no guarding or rebound.    Musculoskeletal:         General: No tenderness. Cervical back: Normal range of motion and neck supple. Lymphadenopathy:      Cervical: No cervical adenopathy. Skin:     General: Skin is warm and dry. Neurological:      Mental Status: He is alert. Psychiatric:         Mood and Affect: Mood normal.         ASSESSMENT:    1. Controlled type 2 diabetes mellitus without complication, without long-term current use of insulin ([address]    2. Essential hypertension    3. Mixed hyperlipidemia    4. Takotsubo cardiomyopathy    5. Other neutropenia ([address]        PLAN:    Dorothy Franco was seen today for 3 month follow-up, diabetes and results. Diagnoses and all orders for this visit:    Controlled type 2 diabetes mellitus without complication, without long-term current use of insulin ([address]- SUGARS ONLY SL HIGHER W POCT HGB A1C 8.2, NOT 8.9-- FOR NOW CONT REGIMEN AND HE'LL WORK MORE AGGRESSIVELY ON DIET, EXERCISE  -     metFORMIN (GLUCOPHAGE) 500 MG tablet; TAKE 1 TABLET BY MOUTH TWICE A DAY  -     lisinopril (PRINIVIL;ZESTRIL) 40 MG tablet; TAKE 1 TABLET BY MOUTH EVERY DAY  -     blood glucose test strips (ONETOUCH ULTRA) strip; 1 each by Other route 3 times daily As needed. -     Comprehensive Metabolic Panel; Future  -     CBC with Auto Differential; Future  -     Lipid Panel; Future  -     Hemoglobin A1C; Future  -     POCT glycosylated hemoglobin (Hb A1C)    Essential hypertension - Blood pressure stable and will continue current regimen. Will plan periodic monitoring of renal function, electrolytes, lipid profile. -     lisinopril (PRINIVIL;ZESTRIL) 40 MG tablet; TAKE 1 TABLET BY MOUTH EVERY DAY  -     hydroCHLOROthiazide (HYDRODIURIL) 25 MG tablet; TAKE 1/2 TABLET BY MOUTH EVERY DAY  -     hydrALAZINE (APRESOLINE) 10 MG tablet;  Take 1 tablet by mouth 2 times daily as needed (TAKE FOR SYSTOLIC BP >751 AND ALSO CAN TAKE 1 HOUR PRIOR TO ANY PHYSICIAN APPOINTMENTS.)  -     amLODIPine (NORVASC) 5 MG tablet; TAKE 1 TABLET BY MOUTH EVERY DAY  - Comprehensive Metabolic Panel; Future  -     CBC with Auto Differential; Future  -     Lipid Panel; Future    Mixed hyperlipidemia - Pt will continue to work on a low fat diet and also exercise, wt loss as appropriate. Will continue periodic monitoring of fasting lipid profile, glucose, liver function. -     rosuvastatin (CRESTOR) 10 MG tablet; Take 1 tablet by mouth daily  -     Comprehensive Metabolic Panel; Future  -     CBC with Auto Differential; Future  -     Lipid Panel; Future  -     TSH; Future    Takotsubo cardiomyopathy - Pt clinically w/o evidence of cardiovascular instability, cont regimen. HAD SL IRREG HR NOTED ON EXAM BUT ONLY PREMATURE BEATS- PACS-  NOTED ON EKG W BASELINE NSR  -     EKG 12 Lead  -     Comprehensive Metabolic Panel; Future  -     CBC with Auto Differential; Future  -     Magnesium;  Future    Other neutropenia ([address]- FOR CONT MONITORING AND ALSO EVAL W HEME/ONC DR Malaika Mckinley  -     CBC with Auto Differential; Future

## 2022-05-04 ENCOUNTER — TELEPHONE (OUTPATIENT)
Dept: INTERNAL MEDICINE CLINIC | Age: 82
End: 2022-05-04

## 2022-05-04 RX ORDER — OSELTAMIVIR PHOSPHATE 75 MG/1
75 CAPSULE ORAL DAILY
Qty: 7 CAPSULE | Refills: 0 | Status: SHIPPED | OUTPATIENT
Start: 2022-05-04 | End: 2022-05-10

## 2022-05-04 NOTE — TELEPHONE ENCOUNTER
Patient calls- his wife tested positive for flu today at Urgent Care. He was instructed to inform his PCP. Endy has no sx's. Per Dr. Hien Bess, Tamiflu 75 qd x 7 days. Patient notified and expressed understanding.

## 2022-05-10 ENCOUNTER — INITIAL CONSULT (OUTPATIENT)
Dept: ONCOLOGY | Age: 82
End: 2022-05-10
Payer: MEDICARE

## 2022-05-10 ENCOUNTER — HOSPITAL ENCOUNTER (OUTPATIENT)
Dept: INFUSION THERAPY | Age: 82
Discharge: HOME | End: 2022-05-10
Payer: MEDICARE

## 2022-05-10 VITALS
SYSTOLIC BLOOD PRESSURE: 158 MMHG | WEIGHT: 174 LBS | HEIGHT: 68 IN | RESPIRATION RATE: 16 BRPM | BODY MASS INDEX: 26.37 KG/M2 | DIASTOLIC BLOOD PRESSURE: 74 MMHG | OXYGEN SATURATION: 96 % | TEMPERATURE: 98.2 F | HEART RATE: 74 BPM

## 2022-05-10 DIAGNOSIS — D72.819 LEUKOPENIA, UNSPECIFIED TYPE: ICD-10-CM

## 2022-05-10 DIAGNOSIS — D72.819 LEUKOPENIA, UNSPECIFIED TYPE: Primary | ICD-10-CM

## 2022-05-10 PROCEDURE — 4040F PNEUMOC VAC/ADMIN/RCVD: CPT | Performed by: INTERNAL MEDICINE

## 2022-05-10 PROCEDURE — 1123F ACP DISCUSS/DSCN MKR DOCD: CPT | Performed by: INTERNAL MEDICINE

## 2022-05-10 PROCEDURE — G8417 CALC BMI ABV UP PARAM F/U: HCPCS | Performed by: INTERNAL MEDICINE

## 2022-05-10 PROCEDURE — 99204 OFFICE O/P NEW MOD 45 MIN: CPT | Performed by: INTERNAL MEDICINE

## 2022-05-10 PROCEDURE — 36415 COLL VENOUS BLD VENIPUNCTURE: CPT

## 2022-05-10 PROCEDURE — G8427 DOCREV CUR MEDS BY ELIG CLIN: HCPCS | Performed by: INTERNAL MEDICINE

## 2022-05-10 PROCEDURE — 1036F TOBACCO NON-USER: CPT | Performed by: INTERNAL MEDICINE

## 2022-05-10 PROCEDURE — 85025 COMPLETE CBC W/AUTO DIFF WBC: CPT

## 2022-05-10 RX ORDER — LORAZEPAM 0.5 MG/1
0.5 TABLET ORAL DAILY
Qty: 2 TABLET | Refills: 0 | Status: SHIPPED | OUTPATIENT
Start: 2022-05-10 | End: 2022-05-12

## 2022-05-10 RX ORDER — LORAZEPAM 0.5 MG/1
TABLET ORAL
COMMUNITY

## 2022-05-10 ASSESSMENT — PATIENT HEALTH QUESTIONNAIRE - PHQ9
SUM OF ALL RESPONSES TO PHQ QUESTIONS 1-9: 0
SUM OF ALL RESPONSES TO PHQ QUESTIONS 1-9: 0
1. LITTLE INTEREST OR PLEASURE IN DOING THINGS: 0
SUM OF ALL RESPONSES TO PHQ QUESTIONS 1-9: 0
SUM OF ALL RESPONSES TO PHQ QUESTIONS 1-9: 0
SUM OF ALL RESPONSES TO PHQ9 QUESTIONS 1 & 2: 0
2. FEELING DOWN, DEPRESSED OR HOPELESS: 0

## 2022-05-10 NOTE — PROGRESS NOTES
MA Rooming Questions  Patient: Rikki Higginbotham  MRN: [id number]    Date: 5/10/2022        NP    5. Did the patient have a depression screening completed today?  Yes    PHQ-9 Total Score: 0 (5/10/2022  1:35 PM)       PHQ-9 Given to (if applicable):               PHQ-9 Score (if applicable):                     [] Positive     [x]  Negative              Does question #9 need addressed (if applicable)                     [] Yes    []  No               Leonor Culp MA

## 2022-05-11 LAB
BASOPHILS ABSOLUTE: 0 K/CU MM
BASOPHILS RELATIVE PERCENT: 0.7 % (ref 0–1)
DIFFERENTIAL TYPE: ABNORMAL
EOSINOPHILS ABSOLUTE: 0.1 K/CU MM
EOSINOPHILS RELATIVE PERCENT: 3.4 % (ref 0–3)
HCT VFR BLD CALC: 46.9 % (ref 42–52)
HEMOGLOBIN: 15.7 GM/DL (ref 13.5–18)
LYMPHOCYTES ABSOLUTE: 1 K/CU MM
LYMPHOCYTES RELATIVE PERCENT: 34.6 % (ref 24–44)
MCH RBC QN AUTO: 32.8 PG (ref 27–31)
MCHC RBC AUTO-ENTMCNC: 33.5 % (ref 32–36)
MCV RBC AUTO: 98.1 FL (ref 78–100)
MONOCYTES ABSOLUTE: 0.3 K/CU MM
MONOCYTES RELATIVE PERCENT: 10.1 % (ref 0–4)
PDW BLD-RTO: 13.3 % (ref 11.7–14.9)
PLATELET # BLD: 160 K/CU MM (ref 140–440)
PMV BLD AUTO: 11.1 FL (ref 7.5–11.1)
RBC # BLD: 4.78 M/CU MM (ref 4.6–6.2)
SEGMENTED NEUTROPHILS ABSOLUTE COUNT: 1.5 K/CU MM
SEGMENTED NEUTROPHILS RELATIVE PERCENT: 51.2 % (ref 36–66)
WBC # BLD: 3 K/CU MM (ref 4–10.5)

## 2022-05-13 DIAGNOSIS — I10 ESSENTIAL HYPERTENSION: ICD-10-CM

## 2022-05-13 RX ORDER — HYDRALAZINE HYDROCHLORIDE 10 MG/1
10 TABLET, FILM COATED ORAL 2 TIMES DAILY PRN
Qty: 60 TABLET | Refills: 3 | OUTPATIENT
Start: 2022-05-13 | End: 2023-05-13

## 2022-06-15 ENCOUNTER — PROCEDURE VISIT (OUTPATIENT)
Dept: ONCOLOGY | Age: 82
End: 2022-06-15
Payer: MEDICARE

## 2022-06-15 ENCOUNTER — HOSPITAL ENCOUNTER (OUTPATIENT)
Dept: INFUSION THERAPY | Age: 82
Discharge: HOME | End: 2022-06-15
Payer: MEDICARE

## 2022-06-15 VITALS
SYSTOLIC BLOOD PRESSURE: 188 MMHG | BODY MASS INDEX: 25.98 KG/M2 | WEIGHT: 171.4 LBS | HEART RATE: 99 BPM | TEMPERATURE: 98.9 F | RESPIRATION RATE: 16 BRPM | HEIGHT: 68 IN | DIASTOLIC BLOOD PRESSURE: 74 MMHG | OXYGEN SATURATION: 99 %

## 2022-06-15 DIAGNOSIS — N18.30 STAGE 3 CHRONIC KIDNEY DISEASE, UNSPECIFIED WHETHER STAGE 3A OR 3B CKD (HCC): ICD-10-CM

## 2022-06-15 DIAGNOSIS — I50.22 CHRONIC SYSTOLIC (CONGESTIVE) HEART FAILURE (HCC): ICD-10-CM

## 2022-06-15 DIAGNOSIS — D72.819 LEUKOPENIA, UNSPECIFIED TYPE: Primary | ICD-10-CM

## 2022-06-15 DIAGNOSIS — D72.819 LEUKOPENIA, UNSPECIFIED TYPE: ICD-10-CM

## 2022-06-15 LAB
BASOPHILS ABSOLUTE: 0 K/CU MM
BASOPHILS RELATIVE PERCENT: 0.7 % (ref 0–1)
DIFFERENTIAL TYPE: ABNORMAL
EOSINOPHILS ABSOLUTE: 0.1 K/CU MM
EOSINOPHILS RELATIVE PERCENT: 5.2 % (ref 0–3)
HCT VFR BLD CALC: 45.5 % (ref 42–52)
HEMOGLOBIN: 15.2 GM/DL (ref 13.5–18)
LYMPHOCYTES ABSOLUTE: 0.9 K/CU MM
LYMPHOCYTES RELATIVE PERCENT: 34.8 % (ref 24–44)
MCH RBC QN AUTO: 32.3 PG (ref 27–31)
MCHC RBC AUTO-ENTMCNC: 33.4 % (ref 32–36)
MCV RBC AUTO: 96.8 FL (ref 78–100)
MONOCYTES ABSOLUTE: 0.3 K/CU MM
MONOCYTES RELATIVE PERCENT: 11.6 % (ref 0–4)
PDW BLD-RTO: 13.4 % (ref 11.7–14.9)
PLATELET # BLD: 169 K/CU MM (ref 140–440)
PMV BLD AUTO: 11.6 FL (ref 7.5–11.1)
RBC # BLD: 4.7 M/CU MM (ref 4.6–6.2)
RETICULOCYTE COUNT PCT: 1.4 % (ref 0.2–2.2)
SEGMENTED NEUTROPHILS ABSOLUTE COUNT: 1.3 K/CU MM
SEGMENTED NEUTROPHILS RELATIVE PERCENT: 47.7 % (ref 36–66)
WBC # BLD: 2.7 K/CU MM (ref 4–10.5)

## 2022-06-15 PROCEDURE — 38221 DX BONE MARROW BIOPSIES: CPT | Performed by: INTERNAL MEDICINE

## 2022-06-15 PROCEDURE — 85025 COMPLETE CBC W/AUTO DIFF WBC: CPT

## 2022-06-15 PROCEDURE — 38220 DX BONE MARROW ASPIRATIONS: CPT | Performed by: INTERNAL MEDICINE

## 2022-06-15 PROCEDURE — 85045 AUTOMATED RETICULOCYTE COUNT: CPT

## 2022-06-15 PROCEDURE — 88305 TISSUE EXAM BY PATHOLOGIST: CPT | Performed by: PATHOLOGY

## 2022-06-15 PROCEDURE — 88311 DECALCIFY TISSUE: CPT | Performed by: PATHOLOGY

## 2022-06-15 PROCEDURE — 36415 COLL VENOUS BLD VENIPUNCTURE: CPT

## 2022-06-15 NOTE — PROGRESS NOTES
Cory York  1940  [id number]      Primary Oncologist: Isaías Holbrook MD  Referring Provider:     6/15/2022     BONE MARROW BIOPSY & ASPIRATION    Consent:  Cory York voiced an understanding of the risks/benefits and the answers to their questions, then proceeded to sign and date the consent. A written consent was obtained from the patient and placed into the chart. Consent includes:  1. Explanation of the potential risks and benefits of this procedure, alternative methods of treatment, and risks despite precautions. 2. An understanding of the current diagnosis to make treatment decisions. 3. Administration of anesthetics as deemed advisable for the procedure. 4. An understanding that tissues and products may be removed and sent to pathology or disposed of by an appropriate source per practice standard. A time out was performed verifying the patient name, date of birth, procedure and location of the procedure. Procedure - BM Asp and Bx     Indication: leukopenia     Anesthesia:   5 ml 1 % Lidocaine                        0.5 mg Lorazepam     Patient position: prone     Bone Marrow Needle:      Procedure Steps: The patient was placed in the  prone position. The area was prepped and draped in a sterile fashion. Using an \"I\" needle a bone marrow aspirate was obtained from the right posterior iliac crest. Using Jamshidi needle a bone marrow biopsy was obtained. A sterile bandage was applied. No significant bleeding. Sample sent for histology, flow cytometry, and cytogenetics. In addition sample was sent for MDS     The patient is given the usual after care instructions. The patient will keep the area clearn and dry for 24 hours. Appropriate pain control instructions were discussed. The patient knows to call [phone] with any signs of bleeding, infection, or other difficulties.          Isaías Holbrook MD

## 2022-06-15 NOTE — PROGRESS NOTES
MA Bone Marrow Rooming Questions  Patient: Devorah Mansfield  MRN: [id number]    Date: 6/15/2022        Time out procedure was completed prior to Bone Marrow Procedures. Patient verbalized understanding of post op instructions and copy of instructions sent home with patient. Patient was discharged home. 1. Do you have any new issues?   no         2. Do you need any refills on medications?    no    3. Have you had any imaging done since your last visit?   no    4. Have you been hospitalized or seen in the emergency room since your last visit here?   no    5. Did the patient have a depression screening completed today?  Yes    No data recorded     PHQ-9 Given to (if applicable):               PHQ-9 Score (if applicable):                     [] Positive     []  Negative              Does question #9 need addressed (if applicable)                     [] Yes    []  No               Ritchie Rajan CMA

## 2022-06-15 NOTE — PROGRESS NOTES
Patient Name:  Ruthie Peck  Patient :  1940  Patient MRN:  [id number]     Primary Oncologist: Thu De León MD  Referring Provider: Lavonne Jason MD     Date of Service: 2022      Chief Complaint:    Chief Complaint   Patient presents with    Follow-up      He came in for follow up visit. Patient Active Problem List:     Hyperlipidemia     Adenocarcinoma of colon     Atherosclerosis of aorta      Anxiety     Onychomycosis     Controlled type 2 diabetes mellitus without complication, without long-term current use of insulin      Age-related macular degeneration, wet, right eye      Takotsubo cardiomyopathy     Hypertension     Other neutropenia ([address]     HPI:   Karyle Silvas is a pleasant 80year old male patient who was referred for evaluation of leukopenia. In 2022 creatinine was 1.4. LFTs wnl.    2022 WBC: 2.8 (L) RBC: 4.75 Hemoglobin: 15.6 Hematocrit: 46.0 MCV: 96.7 Platelet Count: 831  Basophils %: 0.5 Neutrophils Absolute: 1.1 (L) Lymphocytes Absolute: 1.2 Monocytes Absolute: 0.3 Eosinophils Absolute: 0.2 Basophils Absolute: 0.0    WBC has been fluctuating between 2.8 and 4.7. He denied any frequent infection. I reviewed his medication list.  We discussed about potential bone marrow study to rule out myelodysplasia or other bone marrow diseases. On May 10, 2022 WBC was 2.98, hemoglobin 15.7, platelet 163. Absolute neutrophil was 1.53. He is agreeable to have bone marrow study in the next couple weeks. I will give him low-dose Ativan which he may take at night and half an hour before the bone marrow study. He had history of rectal cancer in 2005 status post chemo and radiation therapy. Colonoscopy was in 2021. Follow-up in 3 years. On 2022 he came in for follow up visit. In 2022 WBC 2.7, Hg 15.2, plt 169. Retic 1.4. ANC 1.3.    6/15/2022 bone marrow report  A-C. Bone marrow, biopsy, clot section, and aspirate smears:    -     Hemodilute specimen, see comment. Peripheral blood smear:   -     Leukopenia. Comment   The core biopsy shows bone trabeculae with fat. No cellular bone marrow tissue is seen. Peripheral blood counts are not low enough to suggest an essentially acellular marrow, therefore, the biopsy may have been taken from a particularly hypocellular spot in the bone. The peripheral smear shows leukopenia with no overtly abnormal circulating   cells. If clinically indicated, a repeat bone marrow biopsy to obtain a more cellular sample may be considered. Microscopic description:     Bone marrow biopsy and clot section: Bone trabeculae are   seen with fat. No cellular marrow particles are seen. Bone marrow aspirate smear: Hemodilute specimen. Flow cytometry: No diagnostic immunophenotypic abnormalities   detected. Karyotype: normal (1)    FISH: For MDS was negative. Peripheral blood smear:   CBC: WBC: 2.7 k/cumm, Hb: 15.2 g/dL, MCV: 96.8 fL, RDW: 13.4%, platelets: 590 k/cumm   Differential: Neutrophils: 47.7%, lymphocytes: 34.8%, monocytes: 11.6%, eosinophils: 5.2%, basophils: 0.7% RBCs: Normocytic normochromic   WBCs: Leukopenia with no overtly abnormal cells seen   Platelets: Adequate with some large platelets present     Any frequent infection. He is feeling all right. He is agreeable that we continue with observation. I may consider to repeat bone marrow study if he has worsening leukopenia. No acute pain. Denied any nausea, vomiting or diarrhea. No fever or chills. No chest pain, shortness of breath or palpitation. No headache or dizzy spell. No specific bone pain. No melena or hematochezia. Denied any dysuria or hematuria.     Past Medical History:   Past Medical History:   Diagnosis Date    Adenocarcinoma of colon ([facility] [address] 2005    Dr Trent Check 8/24/21- colonoscopy, recheck 3 yrs    Age-related macular degeneration, wet, right eye ([facility] [address] 02/2017    Dr Bita Prieto starting shots    Anxiety     ON PRN ATIVAN ~30 TABS Q 3MO INTERVAL    Atherosclerosis of aorta ([facility] [address]     noted on CXR 2012- no aneurysm on u/s- 3/2017    Depression     DM II (diabetes mellitus, type II), controlled (HCC)     metformin higher than 500mg BID causes severe diarrhea    Family history of coronary artery disease     History of cardiac cath 12/09/2017    Mid LAD has 20% stenosis. Start beta blockers and ace     History of echocardiogram 12/09/2017    EF 30-35%     Hx of Doppler echocardiogram 03/06/2018    Limited-EF50-60%,normal    Hyperlipidemia     Hypertension     WITH STRONG COMPONENT OF WHITE COAT SYNDROME-- HIGH BPS W PHYSICIAN APPTS--- TRYING PRN HYDRALAZINE PRIOR TO VISITS    Onychomycosis     Other neutropenia ([facility] [address] 04/12/2022    **CONSIDER HEM/ONC CONSULTATION    Takotsubo cardiomyopathy 12/2017    Dr Goran York-- PLS NOTE, HAS INCR ANXIETY AND FATIGUE ON METOPROLOL, SO ONLY TAKING PRN SBP >170    Tinnitus       Past Surgery History:    Past Surgical History:   Procedure Laterality Date    CARDIAC CATHETERIZATION  12/09/2017    Mid LAD has 20% stenosis.  CHOLECYSTECTOMY, LAPAROSCOPIC  8/10    Dr. Zac Gutierrez  11/05    Low anterior rectosigmoid colectomy Dr. Angeles Left  2007    Cataract extraction     Social History:   He denied any history of smoking. No alcohol abuse. He has 5 children. Family History:   No cancer in the family. Review of Systems: The remainder of ROS is unremarkable.      Vital Signs: BP (!) 181/81 (Site: Left Upper Arm, Position: Sitting, Cuff Size: Medium Adult)   Pulse 72   Temp 98.1 °F (36.7 °C) (Temporal)   Resp 16   Ht 5' 8\" (1.727 m)   Wt 173 lb 9.6 oz (78.7 kg)   SpO2 97%   BMI 26.40 kg/m²      Physical Exam:  CONSTITUTIONAL: awake, alert, cooperative, no apparent distress   EYES: pupils equal and round, sclera clear and conjunctiva normal  ENT: Normocephalic, without obvious abnormality, atraumatic  NECK: supple, symmetrical, no jugular venous distension and no carotid bruits   HEMATOLOGIC/LYMPHATIC: no cervical, supraclavicular or axillary lymphadenopathy   LUNGS: no increased work of breathing and clear to auscultation   CARDIOVASCULAR: regular rate and rhythm, normal S1 and S2, no murmur   ABDOMEN: normal bowel sound, soft, non-distended, non-tender, no masses palpated, no hepatosplenomegaly   MUSCULOSKELETAL: full range of motion noted, tone is normal  NEUROLOGIC: Motor skills grossly intact. CN II - XII grossly intact. SKIN: Normal skin color, texture, turgor and no jaundice. appears intact   EXTREMITIES: no LE edema or cyanosis.      Labs:  Hematology:  Lab Results   Component Value Date    WBC 2.7 (L) 06/15/2022    RBC 4.70 06/15/2022    HGB 15.2 06/15/2022    HCT 45.5 06/15/2022    MCV 96.8 06/15/2022    MCH 32.3 (H) 06/15/2022    MCHC 33.4 06/15/2022    RDW 13.4 06/15/2022     06/15/2022    MPV 11.6 (H) 06/15/2022    SEGSPCT 47.7 06/15/2022    EOSRELPCT 5.2 (H) 06/15/2022    BASOPCT 0.7 06/15/2022    LYMPHOPCT 34.8 06/15/2022    MONOPCT 11.6 (H) 06/15/2022    SEGSABS 1.3 06/15/2022    EOSABS 0.1 06/15/2022    BASOSABS 0.0 06/15/2022    LYMPHSABS 0.9 06/15/2022    MONOSABS 0.3 06/15/2022    DIFFTYPE AUTOMATED DIFFERENTIAL 06/15/2022     Chemistry:  Lab Results   Component Value Date     04/11/2022    K 4.4 04/11/2022     04/11/2022    CO2 25 04/11/2022    BUN 26 (H) 04/11/2022    CREATININE 1.4 (H) 04/11/2022    GLUCOSE 170 (H) 04/11/2022    CALCIUM 9.3 04/11/2022    PROT 6.5 04/11/2022    LABALBU 4.6 04/11/2022    BILITOT 0.6 04/11/2022    ALKPHOS 73 04/11/2022    AST 19 04/11/2022    ALT 15 04/11/2022    LABGLOM 49 (A) 04/11/2022    GFRAA 59 (A) 04/11/2022    AGRATIO 2.4 (H) 04/11/2022    GLOB 2.1 08/16/2021    POCGLU 148 (H) 12/10/2017     Immunology:  Lab Results   Component Value Date    PROT 6.5 04/11/2022     Tumor Markers:  Lab Results   Component Value Date    CEA 0.8 08/28/2012    PSA 2.88 11/15/2011      Observations:  No data recorded     Assessment & Plan:  1. Due to the persistent leukopenia, he is agreeable to have a bone marrow study in June 2022. We discussed the potential etiologies of leukopenia. He denied any frequent infection. I reviewed the bone marrow report with him and family. Bone marrow was hemodiluted. Grossly there is no evidence of acute leukemia or MDS. He is agreeable that we continue to monitor CBC. If he has worsening leukopenia, I may repeat a bone marrow study. 2.  He has history of rectal cancer. He received chemo intervention therapy in 2005. He has colonoscopy in August 2021. Follow-up in 3 years was recommended by GI. We discussed about healthy diet and lifestyle. Return to clinic in3 months or sooner. All of his question has been answered for today. I have discussed the above stated plan with the patient and they verbalized understanding and agreed with the plan.

## 2022-07-02 DIAGNOSIS — E11.9 CONTROLLED TYPE 2 DIABETES MELLITUS WITHOUT COMPLICATION, WITHOUT LONG-TERM CURRENT USE OF INSULIN (HCC): ICD-10-CM

## 2022-07-05 RX ORDER — EMPAGLIFLOZIN 10 MG/1
TABLET, FILM COATED ORAL
Qty: 90 TABLET | Refills: 1 | Status: SHIPPED | OUTPATIENT
Start: 2022-07-05 | End: 2022-07-26 | Stop reason: SDUPTHER

## 2022-07-05 RX ORDER — GLIPIZIDE 5 MG/1
TABLET, FILM COATED, EXTENDED RELEASE ORAL
Qty: 270 TABLET | Refills: 1 | Status: SHIPPED | OUTPATIENT
Start: 2022-07-05

## 2022-07-08 ENCOUNTER — OFFICE VISIT (OUTPATIENT)
Dept: ONCOLOGY | Age: 82
End: 2022-07-08
Payer: MEDICARE

## 2022-07-08 ENCOUNTER — HOSPITAL ENCOUNTER (OUTPATIENT)
Dept: INFUSION THERAPY | Age: 82
End: 2022-07-08

## 2022-07-08 VITALS
WEIGHT: 173.6 LBS | DIASTOLIC BLOOD PRESSURE: 81 MMHG | HEART RATE: 72 BPM | RESPIRATION RATE: 16 BRPM | TEMPERATURE: 98.1 F | HEIGHT: 68 IN | SYSTOLIC BLOOD PRESSURE: 181 MMHG | OXYGEN SATURATION: 97 % | BODY MASS INDEX: 26.31 KG/M2

## 2022-07-08 DIAGNOSIS — D72.819 LEUKOPENIA, UNSPECIFIED TYPE: Primary | ICD-10-CM

## 2022-07-08 PROCEDURE — G8427 DOCREV CUR MEDS BY ELIG CLIN: HCPCS | Performed by: INTERNAL MEDICINE

## 2022-07-08 PROCEDURE — 1036F TOBACCO NON-USER: CPT | Performed by: INTERNAL MEDICINE

## 2022-07-08 PROCEDURE — G8417 CALC BMI ABV UP PARAM F/U: HCPCS | Performed by: INTERNAL MEDICINE

## 2022-07-08 PROCEDURE — 99213 OFFICE O/P EST LOW 20 MIN: CPT | Performed by: INTERNAL MEDICINE

## 2022-07-08 PROCEDURE — 1123F ACP DISCUSS/DSCN MKR DOCD: CPT | Performed by: INTERNAL MEDICINE

## 2022-07-08 NOTE — PROGRESS NOTES
MA Rooming Questions  Patient: Pedro Richard  MRN: [id number]    Date: 7/8/2022        1. Do you have any new issues?   no         2. Do you need any refills on medications?    no    3. Have you had any imaging done since your last visit?   no    4. Have you been hospitalized or seen in the emergency room since your last visit here?   no    5. Did the patient have a depression screening completed today?  No    No data recorded     PHQ-9 Given to (if applicable):               PHQ-9 Score (if applicable):                     [] Positive     []  Negative              Does question #9 need addressed (if applicable)                     [] Yes    []  No               Santi Rivera CMA

## 2022-07-14 LAB — DIABETIC RETINOPATHY: NORMAL

## 2022-07-21 DIAGNOSIS — I10 ESSENTIAL HYPERTENSION: ICD-10-CM

## 2022-07-21 DIAGNOSIS — E78.2 MIXED HYPERLIPIDEMIA: ICD-10-CM

## 2022-07-21 DIAGNOSIS — D70.8 OTHER NEUTROPENIA (HCC): ICD-10-CM

## 2022-07-21 DIAGNOSIS — I51.81 TAKOTSUBO CARDIOMYOPATHY: ICD-10-CM

## 2022-07-21 DIAGNOSIS — E11.9 CONTROLLED TYPE 2 DIABETES MELLITUS WITHOUT COMPLICATION, WITHOUT LONG-TERM CURRENT USE OF INSULIN (HCC): ICD-10-CM

## 2022-07-21 LAB
A/G RATIO: 2.6 (ref 1.1–2.2)
ALBUMIN SERPL-MCNC: 4.6 G/DL (ref 3.4–5)
ALP BLD-CCNC: 70 U/L (ref 40–129)
ALT SERPL-CCNC: 17 U/L (ref 10–40)
ANION GAP SERPL CALCULATED.3IONS-SCNC: 17 MMOL/L (ref 3–16)
AST SERPL-CCNC: 20 U/L (ref 15–37)
BASOPHILS ABSOLUTE: 0 K/UL (ref 0–0.2)
BASOPHILS RELATIVE PERCENT: 0.8 %
BILIRUB SERPL-MCNC: 0.6 MG/DL (ref 0–1)
BUN BLDV-MCNC: 34 MG/DL (ref 7–20)
CALCIUM SERPL-MCNC: 9.2 MG/DL (ref 8.3–10.6)
CHLORIDE BLD-SCNC: 101 MMOL/L (ref 99–110)
CHOLESTEROL, TOTAL: 173 MG/DL (ref 0–199)
CO2: 25 MMOL/L (ref 21–32)
CREAT SERPL-MCNC: 1.3 MG/DL (ref 0.8–1.3)
EOSINOPHILS ABSOLUTE: 0.2 K/UL (ref 0–0.6)
EOSINOPHILS RELATIVE PERCENT: 5.3 %
GFR AFRICAN AMERICAN: >60
GFR NON-AFRICAN AMERICAN: 53
GLUCOSE BLD-MCNC: 149 MG/DL (ref 70–99)
HCT VFR BLD CALC: 45.2 % (ref 40.5–52.5)
HDLC SERPL-MCNC: 58 MG/DL (ref 40–60)
HEMOGLOBIN: 15.7 G/DL (ref 13.5–17.5)
LDL CHOLESTEROL CALCULATED: 96 MG/DL
LYMPHOCYTES ABSOLUTE: 1.2 K/UL (ref 1–5.1)
LYMPHOCYTES RELATIVE PERCENT: 39.6 %
MAGNESIUM: 2.1 MG/DL (ref 1.8–2.4)
MCH RBC QN AUTO: 33.3 PG (ref 26–34)
MCHC RBC AUTO-ENTMCNC: 34.6 G/DL (ref 31–36)
MCV RBC AUTO: 96.1 FL (ref 80–100)
MONOCYTES ABSOLUTE: 0.3 K/UL (ref 0–1.3)
MONOCYTES RELATIVE PERCENT: 11.2 %
NEUTROPHILS ABSOLUTE: 1.3 K/UL (ref 1.7–7.7)
NEUTROPHILS RELATIVE PERCENT: 43.1 %
PDW BLD-RTO: 13.8 % (ref 12.4–15.4)
PLATELET # BLD: 155 K/UL (ref 135–450)
PMV BLD AUTO: 9.9 FL (ref 5–10.5)
POTASSIUM SERPL-SCNC: 3.9 MMOL/L (ref 3.5–5.1)
RBC # BLD: 4.7 M/UL (ref 4.2–5.9)
SODIUM BLD-SCNC: 143 MMOL/L (ref 136–145)
TOTAL PROTEIN: 6.4 G/DL (ref 6.4–8.2)
TRIGL SERPL-MCNC: 97 MG/DL (ref 0–150)
TSH SERPL DL<=0.05 MIU/L-ACNC: 2.62 UIU/ML (ref 0.27–4.2)
VLDLC SERPL CALC-MCNC: 19 MG/DL
WBC # BLD: 2.9 K/UL (ref 4–11)

## 2022-07-21 PROCEDURE — 36415 COLL VENOUS BLD VENIPUNCTURE: CPT | Performed by: INTERNAL MEDICINE

## 2022-07-22 LAB
ESTIMATED AVERAGE GLUCOSE: 185.8 MG/DL
HBA1C MFR BLD: 8.1 %

## 2022-07-22 NOTE — RESULT ENCOUNTER NOTE
Chol, sugars, labs appear in stable range, DM is controlled, thyroid fxn and magnesium level are blayne. Sugar and chol are better overall and blood counts w his baseline low white count are also stable. .   notify pt please.

## 2022-07-26 ENCOUNTER — OFFICE VISIT (OUTPATIENT)
Dept: INTERNAL MEDICINE CLINIC | Age: 82
End: 2022-07-26
Payer: MEDICARE

## 2022-07-26 VITALS
BODY MASS INDEX: 26.46 KG/M2 | OXYGEN SATURATION: 97 % | DIASTOLIC BLOOD PRESSURE: 78 MMHG | WEIGHT: 174 LBS | RESPIRATION RATE: 14 BRPM | HEART RATE: 70 BPM | SYSTOLIC BLOOD PRESSURE: 142 MMHG

## 2022-07-26 DIAGNOSIS — N18.30 STAGE 3 CHRONIC KIDNEY DISEASE, UNSPECIFIED WHETHER STAGE 3A OR 3B CKD (HCC): ICD-10-CM

## 2022-07-26 DIAGNOSIS — E11.22 TYPE 2 DIABETES MELLITUS WITH STAGE 3 CHRONIC KIDNEY DISEASE, WITHOUT LONG-TERM CURRENT USE OF INSULIN, UNSPECIFIED WHETHER STAGE 3A OR 3B CKD (HCC): Primary | ICD-10-CM

## 2022-07-26 DIAGNOSIS — N18.30 TYPE 2 DIABETES MELLITUS WITH STAGE 3 CHRONIC KIDNEY DISEASE, WITHOUT LONG-TERM CURRENT USE OF INSULIN, UNSPECIFIED WHETHER STAGE 3A OR 3B CKD (HCC): Primary | ICD-10-CM

## 2022-07-26 DIAGNOSIS — E78.2 MIXED HYPERLIPIDEMIA: ICD-10-CM

## 2022-07-26 DIAGNOSIS — I10 PRIMARY HYPERTENSION: ICD-10-CM

## 2022-07-26 DIAGNOSIS — I51.81 TAKOTSUBO CARDIOMYOPATHY: ICD-10-CM

## 2022-07-26 PROBLEM — I50.22 CHRONIC SYSTOLIC (CONGESTIVE) HEART FAILURE (HCC): Status: RESOLVED | Noted: 2022-06-15 | Resolved: 2022-07-26

## 2022-07-26 LAB
CHROMOSOME, BONE MARROW: NORMAL
COMMENT: NORMAL
GDT REPLACEMENT: NORMAL

## 2022-07-26 PROCEDURE — 1036F TOBACCO NON-USER: CPT | Performed by: INTERNAL MEDICINE

## 2022-07-26 PROCEDURE — 3052F HG A1C>EQUAL 8.0%<EQUAL 9.0%: CPT | Performed by: INTERNAL MEDICINE

## 2022-07-26 PROCEDURE — 99214 OFFICE O/P EST MOD 30 MIN: CPT | Performed by: INTERNAL MEDICINE

## 2022-07-26 PROCEDURE — 1123F ACP DISCUSS/DSCN MKR DOCD: CPT | Performed by: INTERNAL MEDICINE

## 2022-07-26 PROCEDURE — G8417 CALC BMI ABV UP PARAM F/U: HCPCS | Performed by: INTERNAL MEDICINE

## 2022-07-26 PROCEDURE — G8427 DOCREV CUR MEDS BY ELIG CLIN: HCPCS | Performed by: INTERNAL MEDICINE

## 2022-07-26 NOTE — PROGRESS NOTES
Lucía Vera  1940 07/26/22    SUBJECTIVE:    Did rec for COVID booster now since dz is prevalent    DM-  seeing Dr Shannan Bunch and getting injections. WE'LL CALL FOR EXAM REPT. HIS A1C HAS IMPROVED SIGNIFICANTLY THIS YR BUT STILL 8.1 SO WE'LL TRY INCR JARDIANCE FROM 10--25MG DAILY  Lab Results   Component Value Date    LABA1C 8.1 07/21/2022    LABA1C 8.2 04/19/2022    LABA1C 8.9 04/11/2022     Lab Results   Component Value Date    LABMICR <1.20 04/11/2022    LDLCALC 96 07/21/2022    CREATININE 1.3 07/21/2022     Hypertension: Stable. Denies CP, SOB, cough, visual changes, dizziness, palpitations or HA. Blood pressure typically runs 120s outside of the office.  - [address]" SYNDROME    Takotsubo cardiomyopathy, cont f/u DR Eric Villarreal and no sx cp or sob as noted. Low wbc and seen by DR Carloz Wagner w BM bx done. Was hypocellular and is being monitored by periodic CBC>    Colon CA, also seeing DR Khan, scope done last yr and next for 2024. OBJECTIVE:    BP (!) 142/78   Pulse 70   Resp 14   Wt 174 lb (78.9 kg)   SpO2 97%   BMI 26.46 kg/m²     Physical Exam  Constitutional:       Appearance: Normal appearance. HENT:      Head: Normocephalic and atraumatic. Eyes:      Extraocular Movements: Extraocular movements intact. Conjunctiva/sclera: Conjunctivae normal.      Pupils: Pupils are equal, round, and reactive to light. Cardiovascular:      Rate and Rhythm: Normal rate and regular rhythm. Heart sounds: Normal heart sounds. No murmur heard. Pulmonary:      Effort: Pulmonary effort is normal. No respiratory distress. Breath sounds: Normal breath sounds. Abdominal:      General: Abdomen is flat. Bowel sounds are normal. There is no distension. Palpations: Abdomen is soft. There is no mass. Tenderness: There is no abdominal tenderness. Hernia: No hernia is present. Musculoskeletal:      Cervical back: Neck supple.       Right lower leg: No

## 2022-07-26 NOTE — PATIENT INSTRUCTIONS
PLEASE BRING YOUR BP MONITOR TO YOUR NEXT APPT W ME SO WE CAN CONFIRM ACCURATE. WE WILL INCR JARDIANCE FROM 10--25MG DAILY.   TAKE 20MG JARDIANCE/TWO OF THE 10MG TILL GONE

## 2022-08-23 ENCOUNTER — OFFICE VISIT (OUTPATIENT)
Dept: CARDIOLOGY CLINIC | Age: 82
End: 2022-08-23
Payer: MEDICARE

## 2022-08-23 VITALS
BODY MASS INDEX: 26.46 KG/M2 | HEIGHT: 68 IN | WEIGHT: 174.6 LBS | DIASTOLIC BLOOD PRESSURE: 80 MMHG | HEART RATE: 80 BPM | SYSTOLIC BLOOD PRESSURE: 136 MMHG

## 2022-08-23 DIAGNOSIS — E11.22 TYPE 2 DIABETES MELLITUS WITH STAGE 3B CHRONIC KIDNEY DISEASE, WITHOUT LONG-TERM CURRENT USE OF INSULIN (HCC): ICD-10-CM

## 2022-08-23 DIAGNOSIS — F41.9 ANXIETY: ICD-10-CM

## 2022-08-23 DIAGNOSIS — I51.81 TAKOTSUBO CARDIOMYOPATHY: ICD-10-CM

## 2022-08-23 DIAGNOSIS — N18.30 STAGE 3 CHRONIC KIDNEY DISEASE, UNSPECIFIED WHETHER STAGE 3A OR 3B CKD (HCC): ICD-10-CM

## 2022-08-23 DIAGNOSIS — I10 PRIMARY HYPERTENSION: ICD-10-CM

## 2022-08-23 DIAGNOSIS — I70.0 ATHEROSCLEROSIS OF AORTA (HCC): Primary | ICD-10-CM

## 2022-08-23 DIAGNOSIS — N18.32 TYPE 2 DIABETES MELLITUS WITH STAGE 3B CHRONIC KIDNEY DISEASE, WITHOUT LONG-TERM CURRENT USE OF INSULIN (HCC): ICD-10-CM

## 2022-08-23 PROCEDURE — 1036F TOBACCO NON-USER: CPT | Performed by: INTERNAL MEDICINE

## 2022-08-23 PROCEDURE — G8417 CALC BMI ABV UP PARAM F/U: HCPCS | Performed by: INTERNAL MEDICINE

## 2022-08-23 PROCEDURE — 1123F ACP DISCUSS/DSCN MKR DOCD: CPT | Performed by: INTERNAL MEDICINE

## 2022-08-23 PROCEDURE — 3052F HG A1C>EQUAL 8.0%<EQUAL 9.0%: CPT | Performed by: INTERNAL MEDICINE

## 2022-08-23 PROCEDURE — G8427 DOCREV CUR MEDS BY ELIG CLIN: HCPCS | Performed by: INTERNAL MEDICINE

## 2022-08-23 PROCEDURE — 99214 OFFICE O/P EST MOD 30 MIN: CPT | Performed by: INTERNAL MEDICINE
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
## 2022-08-23 NOTE — PATIENT INSTRUCTIONS
**It is YOUR responsibilty to bring medication bottles and/or updated medication list to [address] [place] [address]. This will allow us to better serve you and all your healthcare needs**  Please be informed that if you contact our office outside of normal business hours the physician on call cannot help with any scheduling or rescheduling issues, procedure instruction questions or any type of medication issue. We advise you for any urgent/emergency that you go to the nearest emergency room!     PLEASE CALL OUR OFFICE DURING NORMAL BUSINESS HOURS    Monday - Friday   8 am to 5 pm    [place]: [place] [address]: [phone]    [place]:  [phone]

## 2022-09-08 DIAGNOSIS — E11.9 CONTROLLED TYPE 2 DIABETES MELLITUS WITHOUT COMPLICATION, WITHOUT LONG-TERM CURRENT USE OF INSULIN (HCC): ICD-10-CM

## 2022-09-08 DIAGNOSIS — I10 ESSENTIAL HYPERTENSION: ICD-10-CM

## 2022-09-09 RX ORDER — HYDROCHLOROTHIAZIDE 25 MG/1
TABLET ORAL
Qty: 45 TABLET | Refills: 1 | Status: SHIPPED | OUTPATIENT
Start: 2022-09-09 | End: 2022-10-25 | Stop reason: SDUPTHER

## 2022-09-09 RX ORDER — LISINOPRIL 40 MG/1
TABLET ORAL
Qty: 90 TABLET | Refills: 1 | Status: SHIPPED | OUTPATIENT
Start: 2022-09-09

## 2022-10-10 NOTE — PROGRESS NOTES
Patient Name:  Agusto Carranza  Patient :  1940  Patient MRN:  [id number]     Primary Oncologist: Wendi Abbott MD  Referring Provider: Josephine Solis MD     Date of Service: 2022      Chief Complaint:    Chief Complaint   Patient presents with    Follow-up      He came in for follow up visit. Patient Active Problem List:     Hyperlipidemia     Adenocarcinoma of colon     Atherosclerosis of aorta      Anxiety     Onychomycosis     Controlled type 2 diabetes mellitus without complication, without long-term current use of insulin      Age-related macular degeneration, wet, right eye      Takotsubo cardiomyopathy     Hypertension     Other neutropenia ([address]     HPI:   Tamara Cameron is a pleasant 80year old male patient who was referred for evaluation of leukopenia. In 2022 creatinine was 1.4. LFTs wnl.    2022 WBC: 2.8 (L) RBC: 4.75 Hemoglobin: 15.6 Hematocrit: 46.0 MCV: 96.7 Platelet Count: 441  Basophils %: 0.5 Neutrophils Absolute: 1.1 (L) Lymphocytes Absolute: 1.2 Monocytes Absolute: 0.3 Eosinophils Absolute: 0.2 Basophils Absolute: 0.0    WBC has been fluctuating between 2.8 and 4.7. He denied any frequent infection. I reviewed his medication list.  We discussed about potential bone marrow study to rule out myelodysplasia or other bone marrow diseases. On May 10, 2022 WBC was 2.98, hemoglobin 15.7, platelet 740. Absolute neutrophil was 1.53. He had history of rectal cancer in 2005 status post chemo and radiation therapy. Colonoscopy was in 2021. Follow-up in 3 years. In 2022 WBC 2.7, Hg 15.2, plt 169. Retic 1.4. ANC 1.3.    6/15/2022 bone marrow report  A-C. Bone marrow, biopsy, clot section, and aspirate smears:   -     Hemodilute specimen, see comment. Peripheral blood smear:   -     Leukopenia. Comment   The core biopsy shows bone trabeculae with fat. No cellular bone marrow tissue is seen.  Peripheral blood counts are not low enough to suggest an essentially acellular marrow, therefore, the biopsy may have been taken from a particularly hypocellular spot in the bone. The peripheral smear shows leukopenia with no overtly abnormal circulating cells. If clinically indicated, a repeat bone marrow biopsy to obtain a more cellular sample may be considered. Microscopic description:   Bone marrow biopsy and clot section: Bone trabeculae are   seen with fat. No cellular marrow particles are seen. Bone marrow aspirate smear: Hemodilute specimen. Flow cytometry: No diagnostic immunophenotypic abnormalities detected. Karyotype: normal (1)  FISH: For MDS was negative. Peripheral blood smear:   CBC: WBC: 2.7 k/cumm, Hb: 15.2 g/dL, MCV: 96.8 fL, RDW: 13.4%, platelets: 799 k/cumm   Differential: Neutrophils: 47.7%, lymphocytes: 34.8%, monocytes: 11.6%, eosinophils: 5.2%, basophils: 0.7% RBCs: Normocytic normochromic   WBCs: Leukopenia with no overtly abnormal cells seen   Platelets: Adequate with some large platelets present     7/2022 CMP grossly stable with random . TSH 1.62. WBC 2.9, Hg 15.7, plat 155. [address] 1.3.    11/8/2022 he came in for follow up visit. He is agreeable to have labs today. We discussed about potential repeat bone marrow study if he has worsening leukopenia. He denied any weight loss. Denied any frequent infection. He will follow-up with family doctor in January 2023. No acute pain. Denied any nausea, vomiting or diarrhea. No fever or chills. No chest pain, shortness of breath or palpitation. No headache or dizzy spell. No specific bone pain. No melena or hematochezia. Denied any dysuria or hematuria.     Past Medical History:   Diagnosis Date    Adenocarcinoma of colon [facility]) 2005    Dr Jennifer Reid 8/24/21- colonoscopy, recheck 3 yrs    Age-related macular degeneration, wet, right eye ([facility] 02/2017    Dr Spring starting shots    Anxiety     ON PRN ATIVAN ~30 TABS Q 3MO INTERVAL    Atherosclerosis of aorta ([facility] noted on CXR 2012- no aneurysm on u/s- 3/2017    CKD (chronic kidney disease) stage 3, GFR 30-59 ml/min ([facility])     Depression     DM II (diabetes mellitus, type II), controlled ([address]     metformin higher than 500mg BID causes severe diarrhea    Family history of coronary artery disease     History of cardiac cath 12/09/2017    Mid LAD has 20% stenosis. Start beta blockers and ace     History of echocardiogram 12/09/2017    EF 30-35%     Hx of Doppler echocardiogram 03/06/2018    Limited-EF50-60%,normal    Hyperlipidemia     Hypertension     WITH STRONG COMPONENT OF WHITE COAT SYNDROME-- HIGH BPS W PHYSICIAN APPTS--- TRYING PRN HYDRALAZINE PRIOR TO VISITS    Onychomycosis     Other neutropenia ([address] 04/12/2022    Dr Tabitha Kumar    Takotsubo cardiomyopathy 12/2017    Dr Milton Babin-- PLS NOTE, HAS INCR ANXIETY AND FATIGUE ON METOPROLOL, SO ONLY TAKING PRN SBP >170    Tinnitus       Past Surgical History:   Procedure Laterality Date    CARDIAC CATHETERIZATION  12/09/2017    Mid LAD has 20% stenosis. Renzo King  8/10    Dr. M[facility]  11/05    Low anterior rectosigmoid colectomy Dr. Irma Martin  2007    Cataract extraction     Social History:   He denied any history of smoking. No alcohol abuse. He has 5 children. Family History:   No cancer in the family. Review of Systems: The remainder of ROS is unremarkable. Vital Signs: BP (!) 172/79 (Site: Right Upper Arm, Position: Sitting, Cuff Size: Medium Adult)   Pulse 79   Temp 98.3 °F (36.8 °C) (Temporal)   Ht 5' 8\" (1.727 m)   Wt 172 lb 3.2 oz (78.1 kg)   SpO2 99%   BMI 26.18 kg/m²      Physical Exam:  CONSTITUTIONAL: awake, alert, cooperative, no apparent distress   EYES: pupils equal and round.  Sclera clear and conjunctiva normal  ENT: Normocephalic, without obvious abnormality, atraumatic  NECK: supple, symmetrical, no jugular venous distension and no carotid bruits   HEMATOLOGIC/LYMPHATIC: no cervical, supraclavicular or axillary lymphadenopathy   LUNGS: no increased work of breathing and clear to auscultation   CARDIOVASCULAR: regular rate and rhythm, normal S1 and S2, no murmur   ABDOMEN: normal bowel sound, soft, non-distended, non-tender, no masses palpated, no hepatosplenomegaly   MUSCULOSKELETAL: full range of motion noted, tone is normal  NEUROLOGIC: Motor skills grossly intact. CN II - XII grossly intact. SKIN: Normal skin color, texture, turgor and no jaundice. appears intact   EXTREMITIES: no LE edema or cyanosis. Homans negative. Labs:  Hematology:  Lab Results   Component Value Date    WBC 2.9 (L) 07/21/2022    RBC 4.70 07/21/2022    HGB 15.7 07/21/2022    HCT 45.2 07/21/2022    MCV 96.1 07/21/2022    MCH 33.3 07/21/2022    MCHC 34.6 07/21/2022    RDW 13.8 07/21/2022     07/21/2022    MPV 9.9 07/21/2022    SEGSPCT 47.7 06/15/2022    EOSRELPCT 5.3 07/21/2022    BASOPCT 0.8 07/21/2022    LYMPHOPCT 39.6 07/21/2022    MONOPCT 11.2 07/21/2022    SEGSABS 1.3 06/15/2022    EOSABS 0.2 07/21/2022    BASOSABS 0.0 07/21/2022    LYMPHSABS 1.2 07/21/2022    MONOSABS 0.3 07/21/2022    DIFFTYPE AUTOMATED DIFFERENTIAL 06/15/2022     Chemistry:  Lab Results   Component Value Date     07/21/2022    K 3.9 07/21/2022     07/21/2022    CO2 25 07/21/2022    BUN 34 (H) 07/21/2022    CREATININE 1.3 07/21/2022    GLUCOSE 149 (H) 07/21/2022    CALCIUM 9.2 07/21/2022    PROT 6.4 07/21/2022    LABALBU 4.6 07/21/2022    BILITOT 0.6 07/21/2022    ALKPHOS 70 07/21/2022    AST 20 07/21/2022    ALT 17 07/21/2022    LABGLOM 53 (A) 07/21/2022    GFRAA >60 07/21/2022    AGRATIO 2.6 (H) 07/21/2022    GLOB 2.1 08/16/2021    MG 2.10 07/21/2022    POCGLU 148 (H) 12/10/2017     Immunology:  Lab Results   Component Value Date    PROT 6.4 07/21/2022     Tumor Markers:  Lab Results   Component Value Date    CEA 0.8 08/28/2012    PSA 2.88 11/15/2011      Observations:  PHQ-9 Total Score: 0 (11/8/2022  9:28 AM)     Assessment & Plan:  1. Due to the persistent leukopenia, he is agreeable to have a bone marrow study in June 2022. We discussed the potential etiologies of leukopenia. He denied any frequent infection. I reviewed the bone marrow report with him and family. Bone marrow was hemodiluted. Grossly there is no evidence of acute leukemia or MDS. He is agreeable that we continue to monitor CBC. I will check labs today. Advised to call for the test result. If he has worsening leukopenia, I may repeat a bone marrow study. 2.  He has history of rectal cancer. He received chemo intervention therapy in 2005. He has colonoscopy in August 2021. Follow-up in 3 years was recommended by GI. We discussed about healthy diet and lifestyle. Return to clinic in 3 months or sooner. All of his question has been answered for today. I have discussed the above stated plan with the patient and they verbalized understanding and agreed with the plan.

## 2022-10-25 ENCOUNTER — OFFICE VISIT (OUTPATIENT)
Dept: INTERNAL MEDICINE CLINIC | Age: 82
End: 2022-10-25
Payer: MEDICARE

## 2022-10-25 VITALS
RESPIRATION RATE: 16 BRPM | BODY MASS INDEX: 25.85 KG/M2 | HEART RATE: 77 BPM | WEIGHT: 170 LBS | DIASTOLIC BLOOD PRESSURE: 76 MMHG | TEMPERATURE: 97.7 F | SYSTOLIC BLOOD PRESSURE: 122 MMHG | OXYGEN SATURATION: 96 %

## 2022-10-25 DIAGNOSIS — E11.9 CONTROLLED TYPE 2 DIABETES MELLITUS WITHOUT COMPLICATION, WITHOUT LONG-TERM CURRENT USE OF INSULIN (HCC): ICD-10-CM

## 2022-10-25 DIAGNOSIS — I51.81 TAKOTSUBO CARDIOMYOPATHY: ICD-10-CM

## 2022-10-25 DIAGNOSIS — D72.819 LEUKOPENIA, UNSPECIFIED TYPE: ICD-10-CM

## 2022-10-25 DIAGNOSIS — E78.2 MIXED HYPERLIPIDEMIA: ICD-10-CM

## 2022-10-25 DIAGNOSIS — E11.22 TYPE 2 DIABETES MELLITUS WITH STAGE 3 CHRONIC KIDNEY DISEASE, WITHOUT LONG-TERM CURRENT USE OF INSULIN, UNSPECIFIED WHETHER STAGE 3A OR 3B CKD (HCC): Primary | ICD-10-CM

## 2022-10-25 DIAGNOSIS — F41.9 ANXIETY: ICD-10-CM

## 2022-10-25 DIAGNOSIS — I10 ESSENTIAL HYPERTENSION: ICD-10-CM

## 2022-10-25 DIAGNOSIS — N18.30 TYPE 2 DIABETES MELLITUS WITH STAGE 3 CHRONIC KIDNEY DISEASE, WITHOUT LONG-TERM CURRENT USE OF INSULIN, UNSPECIFIED WHETHER STAGE 3A OR 3B CKD (HCC): Primary | ICD-10-CM

## 2022-10-25 DIAGNOSIS — Z23 NEED FOR INFLUENZA VACCINATION: ICD-10-CM

## 2022-10-25 LAB — HBA1C MFR BLD: 8.3 %

## 2022-10-25 PROCEDURE — 90694 VACC AIIV4 NO PRSRV 0.5ML IM: CPT | Performed by: INTERNAL MEDICINE

## 2022-10-25 PROCEDURE — G0008 ADMIN INFLUENZA VIRUS VAC: HCPCS | Performed by: INTERNAL MEDICINE

## 2022-10-25 PROCEDURE — 1036F TOBACCO NON-USER: CPT | Performed by: INTERNAL MEDICINE

## 2022-10-25 PROCEDURE — G8484 FLU IMMUNIZE NO ADMIN: HCPCS | Performed by: INTERNAL MEDICINE

## 2022-10-25 PROCEDURE — 99214 OFFICE O/P EST MOD 30 MIN: CPT | Performed by: INTERNAL MEDICINE

## 2022-10-25 PROCEDURE — 1123F ACP DISCUSS/DSCN MKR DOCD: CPT | Performed by: INTERNAL MEDICINE

## 2022-10-25 PROCEDURE — G8417 CALC BMI ABV UP PARAM F/U: HCPCS | Performed by: INTERNAL MEDICINE

## 2022-10-25 PROCEDURE — 83036 HEMOGLOBIN GLYCOSYLATED A1C: CPT | Performed by: INTERNAL MEDICINE

## 2022-10-25 PROCEDURE — G8427 DOCREV CUR MEDS BY ELIG CLIN: HCPCS | Performed by: INTERNAL MEDICINE

## 2022-10-25 PROCEDURE — 3052F HG A1C>EQUAL 8.0%<EQUAL 9.0%: CPT | Performed by: INTERNAL MEDICINE

## 2022-10-25 RX ORDER — HYDROCHLOROTHIAZIDE 25 MG/1
12.5 TABLET ORAL DAILY
Qty: 45 TABLET | Refills: 1 | Status: SHIPPED | OUTPATIENT
Start: 2022-10-25

## 2022-10-25 RX ORDER — ROSUVASTATIN CALCIUM 10 MG/1
10 TABLET, COATED ORAL DAILY
Qty: 90 TABLET | Refills: 1 | Status: SHIPPED | OUTPATIENT
Start: 2022-10-25

## 2022-10-25 RX ORDER — LORAZEPAM 0.5 MG/1
0.5 TABLET ORAL 2 TIMES DAILY PRN
Qty: 30 TABLET | Refills: 0 | Status: SHIPPED | OUTPATIENT
Start: 2022-10-25 | End: 2022-11-24

## 2022-10-25 RX ORDER — LORAZEPAM 0.5 MG/1
0.5 TABLET ORAL DAILY
Qty: 2 TABLET | Refills: 0 | Status: CANCELLED | OUTPATIENT
Start: 2022-10-25 | End: 2022-10-27

## 2022-10-25 NOTE — PROGRESS NOTES
Augstin Franks  1940  10/25/22    SUBJECTIVE:    DM-  at home glc fluctuating from 95- to 165. POCT A1C TODAY SL HIGHER 8.3. Lab Results   Component Value Date    LABA1C 8.1 07/21/2022    LABA1C 8.2 04/19/2022    LABA1C 8.9 04/11/2022     Lab Results   Component Value Date    LABMICR <1.20 04/11/2022    LDLCALC 96 07/21/2022    CREATININE 1.3 07/21/2022     Hypertension: Stable. Denies CP, SOB, cough, visual changes, dizziness, palpitations or HA. Lipids:  Has history of high cholesterol, not on medication but trying to continue regular low fat diet and maintenance of weight, regular exercise. Colon CA and had lower endoscopy last yr, recheck is 2024. Cont monitoring w Dr Cristal Ellsworth. Dr Kadie Keating monitoring his leukopenia. Takotsubo, CMP, no sx cp or sob. Seeing Dr Debbie Villela    CKD, 3, avoiding nsaids and maintaining hydration. Anxiety stable too w ativan, Controlled substances monitoring: possible medication side effects, risk of tolerance and/or dependence, and alternative treatments discussed, no signs of potential drug abuse or diversion identified and [place]ARRS report reviewed today- activity consistent with treatment plan. OBJECTIVE:    /76   Pulse 77   Temp 97.7 °F (36.5 °C)   Resp 16   Wt 170 lb (77.1 kg)   SpO2 96%   BMI 25.85 kg/m²     Physical Exam  Constitutional:       Appearance: Normal appearance. HENT:      Head: Normocephalic and atraumatic. Eyes:      Extraocular Movements: Extraocular movements intact. Conjunctiva/sclera: Conjunctivae normal.      Pupils: Pupils are equal, round, and reactive to light. Cardiovascular:      Rate and Rhythm: Normal rate and regular rhythm. Heart sounds: Normal heart sounds. No murmur heard. Pulmonary:      Effort: Pulmonary effort is normal. No respiratory distress. Breath sounds: Normal breath sounds. Abdominal:      General: Abdomen is flat. Bowel sounds are normal. There is no distension.       Palpations: Abdomen is soft. There is no mass. Tenderness: There is no abdominal tenderness. Hernia: No hernia is present. Musculoskeletal:      Cervical back: Neck supple. Right lower leg: No edema. Left lower leg: No edema. Neurological:      Mental Status: He is alert. Psychiatric:         Mood and Affect: Mood normal.       ASSESSMENT:    1. Type 2 diabetes mellitus with stage 3 chronic kidney disease, without long-term current use of insulin, unspecified whether stage 3a or 3b CKD ([address]    2. Mixed hyperlipidemia    3. Essential hypertension    4. Leukopenia, unspecified type    5. Anxiety    6. Controlled type 2 diabetes mellitus without complication, without long-term current use of insulin ([address]    7. Need for influenza vaccination    8. Takotsubo cardiomyopathy        PLAN:    Arya Rodgers was seen today for diabetes. Diagnoses and all orders for this visit:    Type 2 diabetes mellitus with stage 3 chronic kidney disease, without long-term current use of insulin, unspecified whether stage 3a or 3b CKD ([address]- hgb a1c sl higher at 8.3 today, POCT. We'll cont working w diet and exercise, he'll then f/u fasting lab in a month to see if levels improve    Mixed hyperlipidemia - Pt will continue to work on a low fat diet and also exercise, wt loss as appropriate. Will continue periodic monitoring of fasting lipid profile, glucose, liver function. -     rosuvastatin (CRESTOR) 10 MG tablet; Take 1 tablet by mouth daily    Essential hypertension - Blood pressure stable and will continue current regimen. Will plan periodic monitoring of renal function, electrolytes, lipid profile. -     hydroCHLOROthiazide (HYDRODIURIL) 25 MG tablet; Take 0.5 tablets by mouth daily    Leukopenia, unspecified type- cont f/u counts w Dr Shanita Palma also evaluating    Anxiety  -Mood stable on current regimen w/o any significant manifestations of severe depression or anxiety noted at this time. Cont current meds.     - LORazepam (ATIVAN) 0.5 MG tablet; Take 1 tablet by mouth 2 times daily as needed for Anxiety for up to 30 days. Controlled type 2 diabetes mellitus without complication, without long-term current use of insulin (HCC)  -     POCT glycosylated hemoglobin (Hb A1C)    Need for influenza vaccination  -     Influenza, FLUAD, (age 72 y+), IM, Preservative Free, 0.5 mL    Takotsubo cardiomopathy, cont f/u cardio, Pt clinically w/o evidence of cardiovascular instability, cont regimen.

## 2022-10-26 DIAGNOSIS — I10 ESSENTIAL HYPERTENSION: ICD-10-CM

## 2022-10-26 RX ORDER — AMLODIPINE BESYLATE 5 MG/1
TABLET ORAL
Qty: 90 TABLET | Refills: 1 | Status: SHIPPED | OUTPATIENT
Start: 2022-10-26

## 2022-11-08 ENCOUNTER — HOSPITAL ENCOUNTER (OUTPATIENT)
Dept: INFUSION THERAPY | Age: 82
Discharge: HOME | End: 2022-11-08
Payer: MEDICARE

## 2022-11-08 ENCOUNTER — OFFICE VISIT (OUTPATIENT)
Dept: ONCOLOGY | Age: 82
End: 2022-11-08
Payer: MEDICARE

## 2022-11-08 VITALS
WEIGHT: 172.2 LBS | BODY MASS INDEX: 26.1 KG/M2 | HEIGHT: 68 IN | SYSTOLIC BLOOD PRESSURE: 172 MMHG | DIASTOLIC BLOOD PRESSURE: 79 MMHG | OXYGEN SATURATION: 99 % | TEMPERATURE: 98.3 F | HEART RATE: 79 BPM

## 2022-11-08 DIAGNOSIS — D72.819 LEUKOPENIA, UNSPECIFIED TYPE: Primary | ICD-10-CM

## 2022-11-08 DIAGNOSIS — D72.819 LEUKOPENIA, UNSPECIFIED TYPE: ICD-10-CM

## 2022-11-08 LAB
ALBUMIN SERPL-MCNC: 4.7 GM/DL (ref 3.4–5)
ALP BLD-CCNC: 78 IU/L (ref 40–128)
ALT SERPL-CCNC: 15 U/L (ref 10–40)
ANION GAP SERPL CALCULATED.3IONS-SCNC: 10 MMOL/L (ref 4–16)
AST SERPL-CCNC: 19 IU/L (ref 15–37)
BASOPHILS ABSOLUTE: 0 K/CU MM
BASOPHILS RELATIVE PERCENT: 0.9 % (ref 0–1)
BILIRUB SERPL-MCNC: 0.5 MG/DL (ref 0–1)
BUN BLDV-MCNC: 28 MG/DL (ref 6–23)
CALCIUM SERPL-MCNC: 9.3 MG/DL (ref 8.3–10.6)
CHLORIDE BLD-SCNC: 102 MMOL/L (ref 99–110)
CO2: 28 MMOL/L (ref 21–32)
CREAT SERPL-MCNC: 1.1 MG/DL (ref 0.9–1.3)
DIFFERENTIAL TYPE: ABNORMAL
EOSINOPHILS ABSOLUTE: 0.2 K/CU MM
EOSINOPHILS RELATIVE PERCENT: 4.6 % (ref 0–3)
GFR SERPL CREATININE-BSD FRML MDRD: >60 ML/MIN/1.73M2
GLUCOSE BLD-MCNC: 227 MG/DL (ref 70–99)
HCT VFR BLD CALC: 46.8 % (ref 42–52)
HEMOGLOBIN: 15.3 GM/DL (ref 13.5–18)
LYMPHOCYTES ABSOLUTE: 0.8 K/CU MM
LYMPHOCYTES RELATIVE PERCENT: 24.1 % (ref 24–44)
MCH RBC QN AUTO: 32.1 PG (ref 27–31)
MCHC RBC AUTO-ENTMCNC: 32.7 % (ref 32–36)
MCV RBC AUTO: 98.3 FL (ref 78–100)
MONOCYTES ABSOLUTE: 0.3 K/CU MM
MONOCYTES RELATIVE PERCENT: 9 % (ref 0–4)
PDW BLD-RTO: 13.1 % (ref 11.7–14.9)
PLATELET # BLD: 177 K/CU MM (ref 140–440)
PMV BLD AUTO: 10.7 FL (ref 7.5–11.1)
POTASSIUM SERPL-SCNC: 4.6 MMOL/L (ref 3.5–5.1)
RBC # BLD: 4.76 M/CU MM (ref 4.6–6.2)
SEGMENTED NEUTROPHILS ABSOLUTE COUNT: 2 K/CU MM
SEGMENTED NEUTROPHILS RELATIVE PERCENT: 61.4 % (ref 36–66)
SODIUM BLD-SCNC: 140 MMOL/L (ref 135–145)
TOTAL PROTEIN: 6.4 GM/DL (ref 6.4–8.2)
WBC # BLD: 3.2 K/CU MM (ref 4–10.5)

## 2022-11-08 PROCEDURE — 85025 COMPLETE CBC W/AUTO DIFF WBC: CPT

## 2022-11-08 PROCEDURE — 99211 OFF/OP EST MAY X REQ PHY/QHP: CPT

## 2022-11-08 PROCEDURE — G8417 CALC BMI ABV UP PARAM F/U: HCPCS | Performed by: INTERNAL MEDICINE

## 2022-11-08 PROCEDURE — 1036F TOBACCO NON-USER: CPT | Performed by: INTERNAL MEDICINE

## 2022-11-08 PROCEDURE — 1123F ACP DISCUSS/DSCN MKR DOCD: CPT | Performed by: INTERNAL MEDICINE

## 2022-11-08 PROCEDURE — G8484 FLU IMMUNIZE NO ADMIN: HCPCS | Performed by: INTERNAL MEDICINE

## 2022-11-08 PROCEDURE — 80053 COMPREHEN METABOLIC PANEL: CPT

## 2022-11-08 PROCEDURE — 99213 OFFICE O/P EST LOW 20 MIN: CPT | Performed by: INTERNAL MEDICINE

## 2022-11-08 PROCEDURE — 3078F DIAST BP <80 MM HG: CPT | Performed by: INTERNAL MEDICINE

## 2022-11-08 PROCEDURE — 36415 COLL VENOUS BLD VENIPUNCTURE: CPT

## 2022-11-08 PROCEDURE — 3074F SYST BP LT 130 MM HG: CPT | Performed by: INTERNAL MEDICINE

## 2022-11-08 PROCEDURE — G8427 DOCREV CUR MEDS BY ELIG CLIN: HCPCS | Performed by: INTERNAL MEDICINE

## 2022-11-08 ASSESSMENT — PATIENT HEALTH QUESTIONNAIRE - PHQ9
SUM OF ALL RESPONSES TO PHQ QUESTIONS 1-9: 0
2. FEELING DOWN, DEPRESSED OR HOPELESS: 0
SUM OF ALL RESPONSES TO PHQ9 QUESTIONS 1 & 2: 0
1. LITTLE INTEREST OR PLEASURE IN DOING THINGS: 0
SUM OF ALL RESPONSES TO PHQ QUESTIONS 1-9: 0

## 2022-11-08 NOTE — PROGRESS NOTES
MA Rooming Questions  Patient: Agustin Franks  MRN: [id number]    Date: 11/8/2022        1. Do you have any new issues?   no         2. Do you need any refills on medications?    no    3. Have you had any imaging done since your last visit? yes - labs 7/21 @ pcp    4. Have you been hospitalized or seen in the emergency room since your last visit here?   no    5. Did the patient have a depression screening completed today?  Yes    PHQ-9 Total Score: 0 (11/8/2022  9:28 AM)       PHQ-9 Given to (if applicable):               PHQ-9 Score (if applicable):                     [] Positive     []  Negative              Does question #9 need addressed (if applicable)                     [] Yes    []  No               Laith Saint PaulDAVID

## 2022-11-11 DIAGNOSIS — B35.1 ONYCHOMYCOSIS: ICD-10-CM

## 2022-11-11 RX ORDER — KETOCONAZOLE 20 MG/G
CREAM TOPICAL
Qty: 30 G | Refills: 1 | Status: SHIPPED | OUTPATIENT
Start: 2022-11-11

## 2022-12-10 NOTE — PROGRESS NOTES
Patient Name:  Adrienne Trujillo  Patient :  1940  Patient MRN:  [id number]     Primary Oncologist: Paramjit Pena MD  Referring Provider: Angelic Altamirano MD     Date of Service: 5/10/2022      Reason for Consult: leukopenia      Chief Complaint:    Chief Complaint   Patient presents with    New Patient      Patient Active Problem List:     Hyperlipidemia     Adenocarcinoma of colon     Atherosclerosis of aorta      Anxiety     Onychomycosis     Controlled type 2 diabetes mellitus without complication, without long-term current use of insulin      Age-related macular degeneration, wet, right eye      Takotsubo cardiomyopathy     Hypertension     Other neutropenia ([address]     HPI:   Mary Ellen French is a pleasant 80year old male patient who was referred for evaluation of leukopenia. In 2022 creatinine was 1.4. LFTs wnl.    2022 WBC: 2.8 (L) RBC: 4.75 Hemoglobin: 15.6 Hematocrit: 46.0 MCV: 96.7 Platelet Count: 450  Basophils %: 0.5 Neutrophils Absolute: 1.1 (L) Lymphocytes Absolute: 1.2 Monocytes Absolute: 0.3 Eosinophils Absolute: 0.2 Basophils Absolute: 0.0    WBC has been fluctuating between 2.8 and 4.7. He denied any frequent infection. I reviewed his medication list.  We discussed about potential bone marrow study to rule out myelodysplasia or other bone marrow diseases. On May 10, 2022 WBC was 2.98, hemoglobin 15.7, platelet 561. Absolute neutrophil was 1.53. He is agreeable to have bone marrow study in the next couple weeks. I will give him low-dose Ativan which he may take at night and half an hour before the bone marrow study. He had history of rectal cancer in  status post chemo and radiation therapy. Colonoscopy was in 2021. He will have follow-up in 3 years.     Past Medical History:   Past Medical History:   Diagnosis Date    Adenocarcinoma of colon ([facility]     Dr Breanna Godfrey 21- colonoscopy, recheck 3 yrs    Age-related macular degeneration, wet, right eye ([facility] 02/2017    Dr Macho Hunter starting shots    Anxiety     ON PRN ATIVAN ~30 TABS Q 3MO INTERVAL    Atherosclerosis of aorta ([facility]     noted on CXR 2012- no aneurysm on u/s- 3/2017    Depression     DM II (diabetes mellitus, type II), controlled ([facility]     metformin higher than 500mg BID causes severe diarrhea    Family history of coronary artery disease     History of cardiac cath 12/09/2017    Mid LAD has 20% stenosis. Start beta blockers and ace     History of echocardiogram 12/09/2017    EF 30-35%     Hx of Doppler echocardiogram 03/06/2018    Limited-EF50-60%,normal    Hyperlipidemia     Hypertension     WITH STRONG COMPONENT OF WHITE COAT SYNDROME-- HIGH BPS W PHYSICIAN APPTS--- TRYING PRN HYDRALAZINE PRIOR TO VISITS    Onychomycosis     Other neutropenia ([facility] 04/12/2022    **CONSIDER HEM/ONC CONSULTATION    Takotsubo cardiomyopathy 12/2017    Dr Julia Salas-- PLS NOTE, HAS INCR ANXIETY AND FATIGUE ON METOPROLOL, SO ONLY TAKING PRN SBP >170    Tinnitus       Past Surgery History:    Past Surgical History:   Procedure Laterality Date    CARDIAC CATHETERIZATION  12/09/2017    Mid LAD has 20% stenosis.  CHOLECYSTECTOMY, LAPAROSCOPIC  8/10    Dr. Stephanie Danielson  11/05    Low anterior rectosigmoid colectomy Dr. Shane Denson  2007    Cataract extraction     Social History:   He denied any history of smoking. No alcohol abuse. He has 5 children. Family History:   No cancer in the family. Allergies   Allergen Reactions    Atenolol Other (See Comments)     DIZZINESS, SWEATY- INCR ANXIETY AND INSOMNIA    Crestor [Rosuvastatin Calcium]      Cannot take everyday upsets stomach    Lovastatin      Can not tolerate 10 mg but can tolerate 5 mg       Current Outpatient Medications on File Prior to Visit   Medication Sig Dispense Refill    LORazepam (ATIVAN) 0.5 MG tablet Take by mouth.       rosuvastatin (CRESTOR) 10 MG tablet Take 1 tablet by mouth daily 90 tablet 1    lisinopril (PRINIVIL;ZESTRIL) 40 MG tablet TAKE 1 TABLET BY MOUTH EVERY DAY 90 tablet 1    hydroCHLOROthiazide (HYDRODIURIL) 25 MG tablet TAKE 1/2 TABLET BY MOUTH EVERY DAY 45 tablet 1    hydrALAZINE (APRESOLINE) 10 MG tablet Take 1 tablet by mouth 2 times daily as needed (TAKE FOR SYSTOLIC BP >867 AND ALSO CAN TAKE 1 HOUR PRIOR TO ANY PHYSICIAN APPOINTMENTS.) 60 tablet 3    amLODIPine (NORVASC) 5 MG tablet TAKE 1 TABLET BY MOUTH EVERY DAY 90 tablet 1    blood glucose test strips (ONETOUCH ULTRA) strip 1 each by Other route 3 times daily As needed. 100 strip 5    ketoconazole (NIZORAL) 2 % cream Apply topically daily. 30 g 1    JARDIANCE 10 MG tablet TAKE 1 TABLET BY MOUTH EVERY DAY 90 tablet 1    glipiZIDE (GLUCOTROL XL) 5 MG extended release tablet TAKE 2 TABS IN THE MORNING AND 1 TAB AT BEDTIME 270 tablet 1    aspirin EC 81 MG EC tablet Take 81 mg by mouth daily       No current facility-administered medications on file prior to visit. Review of Systems:    Constitutional:  No weight loss, No fever, No chills, No night sweats. Energy level good. Eyes:  No diplopia, No transient or permanent loss of vision, No scotomata. ENT / Mouth:  No epistaxis, No dysphagia, No hoarseness, No oral ulcers, No gingival bleeding. No sore throat, No postnasal drip, No nasal drip, No mouth pain, No sinus pain, No tinnitus, Normal hearing. Cardiovascular:  No chest pain, No palpitations, No syncope, No upper extremity edema, No lower extremity edema, No calf discomfort. Respiratory:  No cough. No hemoptysis, No pleurisy, No wheezing, No dyspnea. Gastrointestinal:  No abdominal pain, No abdominal cramping, No nausea, No vomiting, No constipation, No diarrhea, No hematochezia, No melena, No jaundice, No dyspepsia, No dysphagia. Urinary:  No dysuria, No hematuria, No urinary incontinence. Musculoskeletal:  No muscle pain, No swollen joints, No joint redness, No bone pain, No spine tenderness.   Skin:  No rash, No nodules, No pruritus, No lesions. Neurologic:  No confusion, No seizures, No syncope, No tremor, No speech change, No headache, No hiccups, No abnormal gait, No sensory changes, No weakness. Psychiatric:  No depression, No anxiety, Concentration normal.  Endocrine:  No polyuria, No polydipsia, No hot flashes, No thyroid symptoms. Hematologic:  No epistaxis, No gingival bleeding, No petechiae, No ecchymosis. Lymphatic:  No lymphadenopathy, No lymphedema. Allergy / Immunologic:  No eczema, No frequent mucous infections, No frequent respiratory infections, No recurrent urticarial, No frequent skin infections. Vital Signs: BP (!) 158/74 (Site: Left Upper Arm, Position: Sitting, Cuff Size: Medium Adult)   Pulse 74   Temp 98.2 °F (36.8 °C) (Infrared)   Resp 16   Ht 5' 8\" (1.727 m)   Wt 174 lb (78.9 kg)   SpO2 96%   BMI 26.46 kg/m²      Physical Exam:  CONSTITUTIONAL: awake, alert, cooperative, no apparent distress   EYES: pupils equal, round and reactive to light, sclera clear and conjunctiva normal  ENT: Normocephalic, without obvious abnormality, atraumatic  NECK: supple, symmetrical, no jugular venous distension and no carotid bruits   HEMATOLOGIC/LYMPHATIC: no cervical, supraclavicular or axillary lymphadenopathy   LUNGS: no increased work of breathing and clear to auscultation   CARDIOVASCULAR: regular rate and rhythm, normal S1 and S2, no murmur noted  ABDOMEN: normal bowel sounds x 4, soft, non-distended, non-tender, no masses palpated, no hepatosplenomegaly   MUSCULOSKELETAL: full range of motion noted, tone is normal  NEUROLOGIC: awake, alert, oriented to name, place and time. Motor skills grossly intact. SKIN: Normal skin color, texture, turgor and no jaundice.  appears intact   EXTREMITIES: no LE edema      Labs:  Hematology:  Lab Results   Component Value Date    WBC 2.8 (L) 04/11/2022    RBC 4.75 04/11/2022    HGB 15.6 04/11/2022    HCT 46.0 04/11/2022    MCV 96.7 04/11/2022    MCH 32.8 04/11/2022 MCHC 33.9 04/11/2022    RDW 13.6 04/11/2022     04/11/2022    MPV 10.2 04/11/2022    SEGSPCT 64.3 12/10/2017    EOSRELPCT 7.2 04/11/2022    BASOPCT 0.5 04/11/2022    LYMPHOPCT 41.9 04/11/2022    MONOPCT 10.6 04/11/2022    SEGSABS 2.6 12/10/2017    EOSABS 0.2 04/11/2022    BASOSABS 0.0 04/11/2022    LYMPHSABS 1.2 04/11/2022    MONOSABS 0.3 04/11/2022    DIFFTYPE AUTOMATED DIFFERENTIAL 12/10/2017     Chemistry:  Lab Results   Component Value Date     04/11/2022    K 4.4 04/11/2022     04/11/2022    CO2 25 04/11/2022    BUN 26 (H) 04/11/2022    CREATININE 1.4 (H) 04/11/2022    GLUCOSE 170 (H) 04/11/2022    CALCIUM 9.3 04/11/2022    PROT 6.5 04/11/2022    LABALBU 4.6 04/11/2022    BILITOT 0.6 04/11/2022    ALKPHOS 73 04/11/2022    AST 19 04/11/2022    ALT 15 04/11/2022    LABGLOM 49 (A) 04/11/2022    GFRAA 59 (A) 04/11/2022    AGRATIO 2.4 (H) 04/11/2022    GLOB 2.1 08/16/2021    POCGLU 148 (H) 12/10/2017     Immunology:  Lab Results   Component Value Date    PROT 6.5 04/11/2022     Tumor Markers:  Lab Results   Component Value Date    CEA 0.8 08/28/2012    PSA 2.88 11/15/2011      Observations:  PHQ-9 Total Score: 0 (5/10/2022  1:35 PM)     Assessment & Plan:  1. Due to the persistent leukopenia, he is agreeable to have a bone marrow study in June 2022. We discussed the potential etiologies of leukopenia. He denied any frequent infection. 2.  He has history of rectal cancer. He received chemo intervention therapy in 2005. He has colonoscopy in August 2021. Follow-up in 3 years was recommended by GI. We discussed about healthy diet and lifestyle. Return to clinic for bone marrow study in June 2022. All of his question has been answered for today. I have discussed the above stated plan with the patient and they verbalized understanding and agreed with the plan. Thank you for allowing us to participate in this patient's care. none

## 2022-12-16 DIAGNOSIS — N18.30 TYPE 2 DIABETES MELLITUS WITH STAGE 3 CHRONIC KIDNEY DISEASE, WITHOUT LONG-TERM CURRENT USE OF INSULIN, UNSPECIFIED WHETHER STAGE 3A OR 3B CKD (HCC): ICD-10-CM

## 2022-12-16 DIAGNOSIS — E11.22 TYPE 2 DIABETES MELLITUS WITH STAGE 3 CHRONIC KIDNEY DISEASE, WITHOUT LONG-TERM CURRENT USE OF INSULIN, UNSPECIFIED WHETHER STAGE 3A OR 3B CKD (HCC): ICD-10-CM

## 2022-12-16 RX ORDER — EMPAGLIFLOZIN 25 MG/1
TABLET, FILM COATED ORAL
Qty: 90 TABLET | Refills: 1 | Status: SHIPPED | OUTPATIENT
Start: 2022-12-16

## 2023-01-05 NOTE — PROGRESS NOTES
Patient Name:  Agustin Franks  Patient :  1940  Patient MRN:  [id number]     Primary Oncologist: Maida Underwood MD  Referring Provider: Tanner Díaz MD     Date of Service: 2/3/2023      Chief Complaint:    No chief complaint on file. He came in for follow up visit. Patient Active Problem List:     Hyperlipidemia     Adenocarcinoma of colon     Atherosclerosis of aorta      Anxiety     Onychomycosis     Controlled type 2 diabetes mellitus without complication, without long-term current use of insulin      Age-related macular degeneration, wet, right eye      Takotsubo cardiomyopathy     Hypertension     Other neutropenia ([address]     HPI:   Heri Velarde is a pleasant 80year old male patient who was referred for evaluation of leukopenia. 2022 creatinine 1.4. LFTs wnl.    2022 WBC: 2.8 (L) RBC: 4.75 Hemoglobin: 15.6 Hematocrit: 46.0 MCV: 96.7 Platelet Count: 931  Basophils %: 0.5 Neutrophils Absolute: 1.1 (L) Lymphocytes Absolute: 1.2 Monocytes Absolute: 0.3 Eosinophils Absolute: 0.2 Basophils Absolute: 0.0    WBC has been fluctuating between 2.8 and 4.7. He denied any frequent infection. I reviewed his medication list.  We discussed about potential bone marrow study to rule out myelodysplasia or other bone marrow diseases. On May 10, 2022 WBC was 2.98, hemoglobin 15.7, platelet 569. Absolute neutrophil was 1.53. He had history of rectal cancer in 2005 status post chemo and radiation therapy. Colonoscopy was in 2021. Follow-up in 3 years. 2022 WBC 2.7, Hg 15.2, plt 169. Retic 1.4. ANC 1.3.    6/15/2022 Bone marrow, biopsy, clot section, and aspirate smears:   -     Hemodilute specimen, see comment. Peripheral blood smear:   -     Leukopenia. Comment   The core biopsy shows bone trabeculae with fat. No cellular bone marrow tissue is seen.  Peripheral blood counts are not low enough to suggest an essentially acellular marrow, therefore, the biopsy may have been taken from a particularly hypocellular spot in the bone. The peripheral smear shows leukopenia with no overtly abnormal circulating cells. If clinically indicated, a repeat bone marrow biopsy to obtain a more cellular sample may be considered. Microscopic description:   Bone marrow biopsy and clot section: Bone trabeculae are   seen with fat. No cellular marrow particles are seen. Bone marrow aspirate smear: Hemodilute specimen. Flow cytometry: No diagnostic immunophenotypic abnormalities detected. Karyotype: normal (1)  FISH: For MDS was negative. Peripheral blood smear:   CBC: WBC: 2.7 k/cumm, Hb: 15.2 g/dL, MCV: 96.8 fL, RDW: 13.4%, platelets: 479 k/cumm   Differential: Neutrophils: 47.7%, lymphocytes: 34.8%, monocytes: 11.6%, eosinophils: 5.2%, basophils: 0.7% RBCs: Normocytic normochromic   WBCs: Leukopenia with no overtly abnormal cells seen   Platelets: Adequate with some large platelets present     7/2022 CMP grossly stable with random . TSH 1.62. WBC 2.9, Hg 15.7, plat 155. ANC 1.3.    2/3/2023 he came in for follow up visit. 1/2023 creat 1.4, LFTs wnl. WBC 3.4, Hg 14.5, MCV 97.6, plat 159. ANC 2.1. He follows with Dr Hira Yadav. He has additional heartbeat on auscultation. No symptoms. No acute pain. Denied any nausea, vomiting or diarrhea. No fever or chills. No chest pain, shortness of breath or palpitation. No headache or dizzy spell. No specific bone pain. No melena or hematochezia. Denied any dysuria or hematuria.     Past Medical History:   Diagnosis Date    Adenocarcinoma of colon ([address] 2005    Dr Madison Verde 8/24/21- colonoscopy, recheck 3 yrs    Age-related macular degeneration, wet, right eye ([address] 02/2017    Dr Sadaf Limon starting shots    Anxiety     ON PRN ATIVAN ~30 TABS Q 3MO INTERVAL    Atherosclerosis of aorta ([address]     noted on CXR 2012- no aneurysm on u/s- 3/2017    CKD (chronic kidney disease) stage 3, GFR 30-59 ml/min ([facility]     Depression     DM II (diabetes mellitus, type II), controlled ([facility]     metformin higher than 500mg BID causes severe diarrhea    Family history of coronary artery disease     History of cardiac cath 12/09/2017    Mid LAD has 20% stenosis. Start beta blockers and ace     History of echocardiogram 12/09/2017    EF 30-35%     Hx of Doppler echocardiogram 03/06/2018    Limited-EF50-60%,normal    Hyperlipidemia     Hypertension     WITH STRONG COMPONENT OF WHITE COAT SYNDROME-- HIGH BPS W PHYSICIAN APPTS--- TRYING PRN HYDRALAZINE PRIOR TO VISITS    Onychomycosis     Other neutropenia ([facility] 04/12/2022    Dr Tevin Gil    Takotsubo cardiomyopathy 12/2017    Dr Heri Bella-- PLS NOTE, HAS INCR ANXIETY AND FATIGUE ON METOPROLOL, SO ONLY TAKING PRN SBP >170    Tinnitus       Past Surgical History:   Procedure Laterality Date    CARDIAC CATHETERIZATION  12/09/2017    Mid LAD has 20% stenosis. Leticia Rom  8/10    Dr. Shannon Silva  11/05    Low anterior rectosigmoid colectomy Dr. Kasia Bernardo  2007    Cataract extraction     Social History:   He denied any history of smoking. No alcohol abuse. He has 5 children. Family History:   No cancer in the family. Review of Systems: The remainder of ROS is unremarkable. Vital Signs: There were no vitals taken for this visit. Physical Exam:  CONSTITUTIONAL: awake, alert, cooperative, no apparent distress   EYES: pupils equal and round. Sclera clear and conjunctiva normal  ENT: Normocephalic, without obvious abnormality, atraumatic  NECK: supple, symmetrical, no jugular venous distension and no carotid bruits   HEMATOLOGIC/LYMPHATIC: no cervical, supraclavicular or axillary lymphadenopathy   LUNGS: no increased work of breathing and clear to auscultation   CARDIOVASCULAR: normal S1 and S2, no murmur   ABDOMEN: normal bowel sound, soft, non-distended, non-tender, no masses palpated, no rebound or guarding.   MUSCULOSKELETAL: full range of motion noted, tone is normal  NEUROLOGIC: Motor skills grossly intact. CN II - XII grossly intact. SKIN: Normal skin color, texture, turgor and no jaundice. appears intact   EXTREMITIES: no LE edema or cyanosis. Homans negative. Labs:  Hematology:  Lab Results   Component Value Date    WBC 3.2 (L) 11/08/2022    RBC 4.76 11/08/2022    HGB 15.3 11/08/2022    HCT 46.8 11/08/2022    MCV 98.3 11/08/2022    MCH 32.1 (H) 11/08/2022    MCHC 32.7 11/08/2022    RDW 13.1 11/08/2022     11/08/2022    MPV 10.7 11/08/2022    SEGSPCT 61.4 11/08/2022    EOSRELPCT 4.6 (H) 11/08/2022    BASOPCT 0.9 11/08/2022    LYMPHOPCT 24.1 11/08/2022    MONOPCT 9.0 (H) 11/08/2022    SEGSABS 2.0 11/08/2022    EOSABS 0.2 11/08/2022    BASOSABS 0.0 11/08/2022    LYMPHSABS 0.8 11/08/2022    MONOSABS 0.3 11/08/2022    DIFFTYPE AUTOMATED DIFFERENTIAL 11/08/2022     Chemistry:  Lab Results   Component Value Date     11/08/2022    K 4.6 11/08/2022     11/08/2022    CO2 28 11/08/2022    BUN 28 (H) 11/08/2022    CREATININE 1.1 11/08/2022    GLUCOSE 227 (H) 11/08/2022    CALCIUM 9.3 11/08/2022    PROT 6.4 11/08/2022    LABALBU 4.7 11/08/2022    BILITOT 0.5 11/08/2022    ALKPHOS 78 11/08/2022    AST 19 11/08/2022    ALT 15 11/08/2022    LABGLOM >60 11/08/2022    GFRAA >60 07/21/2022    AGRATIO 2.6 (H) 07/21/2022    GLOB 2.1 08/16/2021    MG 2.10 07/21/2022    POCGLU 148 (H) 12/10/2017     Immunology:  Lab Results   Component Value Date    PROT 6.4 11/08/2022     Tumor Markers:  Lab Results   Component Value Date    CEA 0.8 08/28/2012    PSA 2.88 11/15/2011      Observations:  No data recorded     Assessment & Plan:  1. Due to the persistent leukopenia, he is agreeable to have a bone marrow study in June 2022. He denied any frequent infection. I reviewed the bone marrow report with him and family. Bone marrow was hemodiluted. Grossly there is no evidence of acute leukemia or MDS.    1/2023 creat 1.4, LFTs wnl. WBC 3.4, Hg 14.5, MCV 97.6, plat 159.  41 [facility] 2.1.  He is agreeable that we continue to monitor CBC. I will check labs prior to next OV. 2.  He has history of rectal cancer. He received chemo intervention therapy in 2005. He has colonoscopy in August 2021. Follow-up in 3 years was recommended by GI.    3. He follows with Dr Darshan Arce. He has additional heartbeat on auscultation. No symptoms. We discussed about healthy diet and lifestyle. Return to clinic in 6 months or sooner. All of his question has been answered for today. I have discussed the above stated plan with the patient and they verbalized understanding and agreed with the plan.

## 2023-01-24 ENCOUNTER — OFFICE VISIT (OUTPATIENT)
Dept: INTERNAL MEDICINE CLINIC | Age: 83
End: 2023-01-24

## 2023-01-24 VITALS
DIASTOLIC BLOOD PRESSURE: 78 MMHG | SYSTOLIC BLOOD PRESSURE: 126 MMHG | BODY MASS INDEX: 25.76 KG/M2 | OXYGEN SATURATION: 98 % | RESPIRATION RATE: 14 BRPM | WEIGHT: 170 LBS | HEIGHT: 68 IN | HEART RATE: 90 BPM

## 2023-01-24 DIAGNOSIS — N18.30 STAGE 3 CHRONIC KIDNEY DISEASE, UNSPECIFIED WHETHER STAGE 3A OR 3B CKD (HCC): ICD-10-CM

## 2023-01-24 DIAGNOSIS — I10 PRIMARY HYPERTENSION: ICD-10-CM

## 2023-01-24 DIAGNOSIS — Z00.00 ROUTINE GENERAL MEDICAL EXAMINATION AT A HEALTH CARE FACILITY: Primary | ICD-10-CM

## 2023-01-24 DIAGNOSIS — E78.2 MIXED HYPERLIPIDEMIA: ICD-10-CM

## 2023-01-24 DIAGNOSIS — N18.30 TYPE 2 DIABETES MELLITUS WITH STAGE 3 CHRONIC KIDNEY DISEASE, WITHOUT LONG-TERM CURRENT USE OF INSULIN, UNSPECIFIED WHETHER STAGE 3A OR 3B CKD (HCC): ICD-10-CM

## 2023-01-24 DIAGNOSIS — H35.3210 EXUDATIVE AGE-RELATED MACULAR DEGENERATION OF RIGHT EYE, UNSPECIFIED STAGE (HCC): ICD-10-CM

## 2023-01-24 DIAGNOSIS — I70.0 ATHEROSCLEROSIS OF AORTA (HCC): ICD-10-CM

## 2023-01-24 DIAGNOSIS — I51.81 TAKOTSUBO CARDIOMYOPATHY: ICD-10-CM

## 2023-01-24 DIAGNOSIS — F41.9 ANXIETY: ICD-10-CM

## 2023-01-24 DIAGNOSIS — E11.22 TYPE 2 DIABETES MELLITUS WITH STAGE 3 CHRONIC KIDNEY DISEASE, WITHOUT LONG-TERM CURRENT USE OF INSULIN, UNSPECIFIED WHETHER STAGE 3A OR 3B CKD (HCC): ICD-10-CM

## 2023-01-24 DIAGNOSIS — D70.8 OTHER NEUTROPENIA (HCC): ICD-10-CM

## 2023-01-24 DIAGNOSIS — C18.9 ADENOCARCINOMA OF COLON (HCC): ICD-10-CM

## 2023-01-24 RX ORDER — LORAZEPAM 0.5 MG/1
0.5 TABLET ORAL 2 TIMES DAILY PRN
Qty: 30 TABLET | Refills: 0 | Status: SHIPPED | OUTPATIENT
Start: 2023-01-24 | End: 2023-02-23

## 2023-01-24 ASSESSMENT — PATIENT HEALTH QUESTIONNAIRE - PHQ9
SUM OF ALL RESPONSES TO PHQ9 QUESTIONS 1 & 2: 0
SUM OF ALL RESPONSES TO PHQ QUESTIONS 1-9: 0
1. LITTLE INTEREST OR PLEASURE IN DOING THINGS: 0
SUM OF ALL RESPONSES TO PHQ QUESTIONS 1-9: 0
2. FEELING DOWN, DEPRESSED OR HOPELESS: 0

## 2023-01-24 ASSESSMENT — LIFESTYLE VARIABLES
HOW OFTEN DO YOU HAVE A DRINK CONTAINING ALCOHOL: NEVER
HOW MANY STANDARD DRINKS CONTAINING ALCOHOL DO YOU HAVE ON A TYPICAL DAY: PATIENT DOES NOT DRINK

## 2023-01-24 NOTE — PROGRESS NOTES
Medicare Annual Wellness Visit    Caty Contreras is here for Diabetes (3 month check )    Assessment & Plan   Routine general medical examination at a health care facility - remains independent, functional and active, no indications/needs for other interventions noted at this time- except as noted below and also findings noted on screening medicare questions. Other neutropenia ([facility]- for cont monitoring of CBC/counts per Dr Raf Wild  Exudative age-related macular degeneration of right eye, unspecified stage ([facility]- cont f/u Dr Warren Huynh w periodic injections. Adenocarcinoma of colon ([facility]  Type 2 diabetes mellitus with stage 3 chronic kidney disease, without long-term current use of insulin, unspecified whether stage 3a or 3b CKD ([facility]  -     Comprehensive Metabolic Panel; Future  -     Lipid Panel; Future  -     Microalbumin / Creatinine Urine Ratio; Future  -      DIABETES FOOT EXAM  -     Hemoglobin A1C; Future  Atherosclerosis of aorta (HCC)- noted on remote CXR  Anxiety- Mood stable on current regimen w/o any significant manifestations of severe depression or anxiety noted at this time. Cont current meds. On rx prn  -     LORazepam (ATIVAN) 0.5 MG tablet; Take 1 tablet by mouth 2 times daily as needed for Anxiety for up to 30 days. , Disp-30 tablet, R-0Normal  Mixed hyperlipidemia  - Pt will continue to work on a low fat diet and also exercise, wt loss as appropriate. Will continue periodic monitoring of fasting lipid profile, glucose, liver function. -     Comprehensive Metabolic Panel; Future  -     Lipid Panel; Future  -     TSH; Future  Takotsubo cardiomyopathy- Pt clinically w/o evidence of cardiovascular instability, cont regimen. For cont monitoring Dr Humphreys Blind 3 chronic kidney disease, unspecified whether stage 3a or 3b CKD ([facility]- cont monitor renal fxn  Primary hypertension - Blood pressure stable and will continue current regimen.   Will plan periodic monitoring of renal function, electrolytes, lipid profile. -     Comprehensive Metabolic Panel; Future  -     Lipid Panel; Future  -     Magnesium; Future    Recommendations for Preventive Services Due: see orders and patient instructions/AVS.  Recommended screening schedule for the next 5-10 years is provided to the patient in written form: see Patient Instructions/AVS.     Return in 3 months (on 4/24/2023) for routine. Subjective   The following acute and/or chronic problems were also addressed today:  Had tested pos for COVID prior to Xmas, has since recovered. Was treated at Urgent Care w zpak apparently? DM-  less active this winter, is on jardiance and we'll f/u lab incl a1c. At home glc ~140. Hx of CKD note and we'll monitor renal fxn. .  Lab Results   Component Value Date    LABA1C 8.3 10/25/2022    LABA1C 8.1 07/21/2022    LABA1C 8.2 04/19/2022     Lab Results   Component Value Date    LABMICR <1.20 04/11/2022    LDLCALC 96 07/21/2022    CREATININE 1.1 11/08/2022     Mac degeneration bilaterally and seeing Dr Guillaume Crump, getting intra ocular inj on L. Seeing Dr Pedro Luis Merida and monitoring his wbc which has been low 3s, also  Seeing Dr Debbie Izaguirre and next yr is due for colonoscopy but he's considering alt GI. Htn and takotsubo CMP, - Stable. Denies CP, SOB, cough, visual changes, dizziness, palpitations or HA. Hx of atherosclerosis aorta noted on remote CXR but no aneurysm and also recent CTA made no mention of any significant levels. Anxiety stable w ativan also as needed, Controlled substances monitoring: possible medication side effects, risk of tolerance and/or dependence, and alternative treatments discussed, no signs of potential drug abuse or diversion identified and [place]AR[place] report reviewed today- activity consistent with treatment plan. Patient's complete Health Risk Assessment and screening values have been reviewed and are found in Flowsheets.  The following problems were reviewed today and where indicated follow up appointments were made and/or referrals ordered. Positive Risk Factor Screenings with Interventions:                 Weight and Activity:  Physical Activity: Inactive    Days of Exercise per Week: 0 days    Minutes of Exercise per Session: 0 min     On average, how many days per week do you engage in moderate to strenuous exercise (like a brisk walk)?: 0 days  Have you lost any weight without trying in the past 3 months?: No  Body mass index: (!) 25.85      Inactivity Interventions:  Advised to work on diet, exercise and wt loss                           Objective   Vitals:    01/24/23 1012   BP: 126/78   Pulse: 90   Resp: 14   SpO2: 98%   Weight: 170 lb (77.1 kg)   Height: 5' 8\" (1.727 m)      Body mass index is 25.85 kg/m². General Appearance: alert and oriented to person, place and time, well developed and well- nourished, in no acute distress  Skin: warm and dry, no rash or erythema  Head: normocephalic and atraumatic  Eyes: pupils equal, round, and reactive to light, extraocular eye movements intact, conjunctivae normal  Neck: supple and non-tender without mass, no thyromegaly or thyroid nodules, no cervical lymphadenopathy  Pulmonary/Chest: clear to auscultation bilaterally- no wheezes, rales or rhonchi, normal air movement, no respiratory distress  Cardiovascular: normal rate, regular rhythm, normal S1 and S2, no murmurs, rubs, clicks, or gallops, distal pulses intact, no carotid bruits  Abdomen: soft, non-tender, non-distended, normal bowel sounds, no masses or organomegaly  Extremities: no cyanosis, clubbing or edema  Musculoskeletal: normal range of motion, no joint swelling, deformity or tenderness  Neurologic:no cranial nerve deficit, gait, coordination and speech normal   FOOT EXAM  Visual inspection:  Deformity/amputation: absent  Skin lesions/pre-ulcerative calluses: absent- HAS SOME SCATTERED ONYCHOMYCOSIS. OFFERED PODIATRY REFERRAL WHICH HE DEFERS FOR NOWW.   Edema: right- negative, left- negative    Sensory exam:  Monofilament sensation: normal  (minimum of 5 random plantar locations tested, avoiding callused areas - > 1 area with absence of sensation is + for neuropathy)      Pulses: normal          Allergies   Allergen Reactions    Atenolol Other (See Comments)     DIZZINESS, SWEATY- INCR ANXIETY AND INSOMNIA    Crestor [Rosuvastatin Calcium]      Cannot take everyday upsets stomach    Lovastatin      Can not tolerate 10 mg but can tolerate 5 mg     Prior to Visit Medications    Medication Sig Taking? Authorizing Provider   JARDIANCE 25 MG tablet TAKE 1 TABLET BY MOUTH EVERY DAY IN THE MORNING Yes Abena Salomon MD   ketoconazole (NIZORAL) 2 % cream Apply topically daily. Yes Abena Salomon MD   amLODIPine (NORVASC) 5 MG tablet TAKE 1 TABLET BY MOUTH EVERY DAY Yes Abena Salomon MD   rosuvastatin (CRESTOR) 10 MG tablet Take 1 tablet by mouth daily Yes Abena Salomon MD   hydroCHLOROthiazide (HYDRODIURIL) 25 MG tablet Take 0.5 tablets by mouth daily Yes Abena Salomon MD   lisinopril (PRINIVIL;ZESTRIL) 40 MG tablet TAKE 1 TABLET BY MOUTH EVERY DAY Yes Abena Salomon MD   glipiZIDE (GLUCOTROL XL) 5 MG extended release tablet TAKE 2 TABS IN THE MORNING AND 1 TAB AT BEDTIME Yes Abena Salomon MD   LORazepam (ATIVAN) 0.5 MG tablet Take by mouth. Yes Historical Provider, MD   hydrALAZINE (APRESOLINE) 10 MG tablet Take 1 tablet by mouth 2 times daily as needed (TAKE FOR SYSTOLIC BP >994 AND ALSO CAN TAKE 1 HOUR PRIOR TO ANY PHYSICIAN APPOINTMENTS.) Yes Abena Salomon MD   blood glucose test strips (ONETOUCH ULTRA) strip 1 each by Other route 3 times daily As needed.  Yes Abena Salomon MD   aspirin EC 81 MG EC tablet Take 81 mg by mouth daily Yes Historical Provider, MD [place] (Including outside providers/suppliers regularly involved in providing care):   Patient Care Team:  Abena Salomon MD as PCP - Radha Eason MD as PCP - [facility] Empaneled Provider     Reviewed and updated this visit:  Tobacco  Allergies  Meds  Problems  Med Hx  Surg Hx  Soc Hx  Fam Hx

## 2023-01-27 ENCOUNTER — HOSPITAL ENCOUNTER (OUTPATIENT)
Dept: INFUSION THERAPY | Age: 83
Discharge: HOME | End: 2023-01-27
Payer: MEDICARE

## 2023-01-27 DIAGNOSIS — D72.819 LEUKOPENIA, UNSPECIFIED TYPE: ICD-10-CM

## 2023-01-27 LAB
ALBUMIN SERPL-MCNC: 4.5 GM/DL (ref 3.4–5)
ALP BLD-CCNC: 72 IU/L (ref 40–128)
ALT SERPL-CCNC: 16 U/L (ref 10–40)
ANION GAP SERPL CALCULATED.3IONS-SCNC: 9 MMOL/L (ref 4–16)
AST SERPL-CCNC: 19 IU/L (ref 15–37)
BASOPHILS ABSOLUTE: 0 K/CU MM
BASOPHILS RELATIVE PERCENT: 0.9 % (ref 0–1)
BILIRUB SERPL-MCNC: 0.6 MG/DL (ref 0–1)
BUN BLDV-MCNC: 31 MG/DL (ref 6–23)
CALCIUM SERPL-MCNC: 9.1 MG/DL (ref 8.3–10.6)
CHLORIDE BLD-SCNC: 101 MMOL/L (ref 99–110)
CO2: 28 MMOL/L (ref 21–32)
CREAT SERPL-MCNC: 1.4 MG/DL (ref 0.9–1.3)
DIFFERENTIAL TYPE: ABNORMAL
EOSINOPHILS ABSOLUTE: 0.1 K/CU MM
EOSINOPHILS RELATIVE PERCENT: 2.4 % (ref 0–3)
GFR SERPL CREATININE-BSD FRML MDRD: 50 ML/MIN/1.73M2
GLUCOSE BLD-MCNC: 246 MG/DL (ref 70–99)
HCT VFR BLD CALC: 43.9 % (ref 42–52)
HEMOGLOBIN: 14.5 GM/DL (ref 13.5–18)
LYMPHOCYTES ABSOLUTE: 0.9 K/CU MM
LYMPHOCYTES RELATIVE PERCENT: 26 % (ref 24–44)
MCH RBC QN AUTO: 32.2 PG (ref 27–31)
MCHC RBC AUTO-ENTMCNC: 33 % (ref 32–36)
MCV RBC AUTO: 97.6 FL (ref 78–100)
MONOCYTES ABSOLUTE: 0.3 K/CU MM
MONOCYTES RELATIVE PERCENT: 8.1 % (ref 0–4)
PDW BLD-RTO: 13.4 % (ref 11.7–14.9)
PLATELET # BLD: 159 K/CU MM (ref 140–440)
PMV BLD AUTO: 11.2 FL (ref 7.5–11.1)
POTASSIUM SERPL-SCNC: 4.5 MMOL/L (ref 3.5–5.1)
RBC # BLD: 4.5 M/CU MM (ref 4.6–6.2)
SEGMENTED NEUTROPHILS ABSOLUTE COUNT: 2.1 K/CU MM
SEGMENTED NEUTROPHILS RELATIVE PERCENT: 62.6 % (ref 36–66)
SODIUM BLD-SCNC: 138 MMOL/L (ref 135–145)
TOTAL PROTEIN: 6 GM/DL (ref 6.4–8.2)
WBC # BLD: 3.4 K/CU MM (ref 4–10.5)

## 2023-01-27 PROCEDURE — 80053 COMPREHEN METABOLIC PANEL: CPT

## 2023-01-27 PROCEDURE — 85025 COMPLETE CBC W/AUTO DIFF WBC: CPT

## 2023-01-27 PROCEDURE — 36415 COLL VENOUS BLD VENIPUNCTURE: CPT

## 2023-02-03 ENCOUNTER — HOSPITAL ENCOUNTER (OUTPATIENT)
Dept: INFUSION THERAPY | Age: 83
Discharge: HOME | End: 2023-02-03
Payer: MEDICARE

## 2023-02-03 ENCOUNTER — OFFICE VISIT (OUTPATIENT)
Dept: ONCOLOGY | Age: 83
End: 2023-02-03
Payer: MEDICARE

## 2023-02-03 VITALS
TEMPERATURE: 97.9 F | HEART RATE: 91 BPM | BODY MASS INDEX: 26.28 KG/M2 | HEIGHT: 68 IN | WEIGHT: 173.4 LBS | OXYGEN SATURATION: 99 % | SYSTOLIC BLOOD PRESSURE: 144 MMHG | DIASTOLIC BLOOD PRESSURE: 82 MMHG

## 2023-02-03 DIAGNOSIS — D72.819 LEUKOPENIA, UNSPECIFIED TYPE: Primary | ICD-10-CM

## 2023-02-03 PROCEDURE — 3079F DIAST BP 80-89 MM HG: CPT | Performed by: INTERNAL MEDICINE

## 2023-02-03 PROCEDURE — 1036F TOBACCO NON-USER: CPT | Performed by: INTERNAL MEDICINE

## 2023-02-03 PROCEDURE — 1123F ACP DISCUSS/DSCN MKR DOCD: CPT | Performed by: INTERNAL MEDICINE

## 2023-02-03 PROCEDURE — G8484 FLU IMMUNIZE NO ADMIN: HCPCS | Performed by: INTERNAL MEDICINE

## 2023-02-03 PROCEDURE — 99213 OFFICE O/P EST LOW 20 MIN: CPT | Performed by: INTERNAL MEDICINE

## 2023-02-03 PROCEDURE — G8427 DOCREV CUR MEDS BY ELIG CLIN: HCPCS | Performed by: INTERNAL MEDICINE

## 2023-02-03 PROCEDURE — G8417 CALC BMI ABV UP PARAM F/U: HCPCS | Performed by: INTERNAL MEDICINE

## 2023-02-03 PROCEDURE — 99211 OFF/OP EST MAY X REQ PHY/QHP: CPT

## 2023-02-03 PROCEDURE — 3077F SYST BP >= 140 MM HG: CPT | Performed by: INTERNAL MEDICINE

## 2023-02-03 NOTE — PROGRESS NOTES
MA Rooming Questions  Patient: Melissa Parks  MRN: [id number]    Date: 2/3/2023        1. Do you have any new issues?   no         2. Do you need any refills on medications?    no    3. Have you had any imaging done since your last visit?   no    4. Have you been hospitalized or seen in the emergency room since your last visit here?   no    5. Did the patient have a depression screening completed today?  No    No data recorded     PHQ-9 Given to (if applicable):               PHQ-9 Score (if applicable):                     [] Positive     []  Negative              Does question #9 need addressed (if applicable)                     [] Yes    []  No               Lori Taylor CMA

## 2023-02-23 DIAGNOSIS — E78.2 MIXED HYPERLIPIDEMIA: ICD-10-CM

## 2023-02-23 DIAGNOSIS — I10 PRIMARY HYPERTENSION: ICD-10-CM

## 2023-02-23 DIAGNOSIS — N18.30 TYPE 2 DIABETES MELLITUS WITH STAGE 3 CHRONIC KIDNEY DISEASE, WITHOUT LONG-TERM CURRENT USE OF INSULIN, UNSPECIFIED WHETHER STAGE 3A OR 3B CKD (HCC): ICD-10-CM

## 2023-02-23 DIAGNOSIS — E11.22 TYPE 2 DIABETES MELLITUS WITH STAGE 3 CHRONIC KIDNEY DISEASE, WITHOUT LONG-TERM CURRENT USE OF INSULIN, UNSPECIFIED WHETHER STAGE 3A OR 3B CKD (HCC): ICD-10-CM

## 2023-02-23 LAB
A/G RATIO: 2.4 (ref 1.1–2.2)
ALBUMIN SERPL-MCNC: 4.5 G/DL (ref 3.4–5)
ALP BLD-CCNC: 82 U/L (ref 40–129)
ALT SERPL-CCNC: 18 U/L (ref 10–40)
ANION GAP SERPL CALCULATED.3IONS-SCNC: 17 MMOL/L (ref 3–16)
AST SERPL-CCNC: 19 U/L (ref 15–37)
BILIRUB SERPL-MCNC: 0.3 MG/DL (ref 0–1)
BUN BLDV-MCNC: 28 MG/DL (ref 7–20)
CALCIUM SERPL-MCNC: 9.3 MG/DL (ref 8.3–10.6)
CHLORIDE BLD-SCNC: 102 MMOL/L (ref 99–110)
CHOLESTEROL, TOTAL: 174 MG/DL (ref 0–199)
CO2: 24 MMOL/L (ref 21–32)
CREAT SERPL-MCNC: 1.4 MG/DL (ref 0.8–1.3)
CREATININE URINE: 86.4 MG/DL (ref 39–259)
ESTIMATED AVERAGE GLUCOSE: 208.7 MG/DL
GFR SERPL CREATININE-BSD FRML MDRD: 50 ML/MIN/{1.73_M2}
GLUCOSE BLD-MCNC: 174 MG/DL (ref 70–99)
HBA1C MFR BLD: 8.9 %
HDLC SERPL-MCNC: 59 MG/DL (ref 40–60)
LDL CHOLESTEROL CALCULATED: 99 MG/DL
MAGNESIUM: 2.1 MG/DL (ref 1.8–2.4)
MICROALBUMIN UR-MCNC: <1.2 MG/DL
MICROALBUMIN/CREAT UR-RTO: NORMAL MG/G (ref 0–30)
POTASSIUM SERPL-SCNC: 4.5 MMOL/L (ref 3.5–5.1)
SODIUM BLD-SCNC: 143 MMOL/L (ref 136–145)
TOTAL PROTEIN: 6.4 G/DL (ref 6.4–8.2)
TRIGL SERPL-MCNC: 81 MG/DL (ref 0–150)
TSH SERPL DL<=0.05 MIU/L-ACNC: 4 UIU/ML (ref 0.27–4.2)
VLDLC SERPL CALC-MCNC: 16 MG/DL

## 2023-02-23 PROCEDURE — 36415 COLL VENOUS BLD VENIPUNCTURE: CPT | Performed by: INTERNAL MEDICINE

## 2023-02-24 DIAGNOSIS — E11.9 CONTROLLED TYPE 2 DIABETES MELLITUS WITHOUT COMPLICATION, WITHOUT LONG-TERM CURRENT USE OF INSULIN (HCC): ICD-10-CM

## 2023-02-24 RX ORDER — GLIPIZIDE 10 MG/1
10 TABLET, FILM COATED, EXTENDED RELEASE ORAL 2 TIMES DAILY WITH MEALS
Qty: 180 TABLET | Refills: 1 | Status: SHIPPED | OUTPATIENT
Start: 2023-02-24

## 2023-03-07 ENCOUNTER — OFFICE VISIT (OUTPATIENT)
Dept: CARDIOLOGY CLINIC | Age: 83
End: 2023-03-07
Payer: MEDICARE

## 2023-03-07 VITALS
DIASTOLIC BLOOD PRESSURE: 70 MMHG | WEIGHT: 174 LBS | HEIGHT: 68 IN | HEART RATE: 76 BPM | SYSTOLIC BLOOD PRESSURE: 136 MMHG | BODY MASS INDEX: 26.37 KG/M2

## 2023-03-07 DIAGNOSIS — I51.81 TAKOTSUBO CARDIOMYOPATHY: Primary | ICD-10-CM

## 2023-03-07 DIAGNOSIS — E78.2 MIXED HYPERLIPIDEMIA: ICD-10-CM

## 2023-03-07 DIAGNOSIS — E11.22 TYPE 2 DIABETES MELLITUS WITH STAGE 3B CHRONIC KIDNEY DISEASE, WITHOUT LONG-TERM CURRENT USE OF INSULIN (HCC): ICD-10-CM

## 2023-03-07 DIAGNOSIS — I70.0 ATHEROSCLEROSIS OF AORTA (HCC): ICD-10-CM

## 2023-03-07 DIAGNOSIS — N18.32 TYPE 2 DIABETES MELLITUS WITH STAGE 3B CHRONIC KIDNEY DISEASE, WITHOUT LONG-TERM CURRENT USE OF INSULIN (HCC): ICD-10-CM

## 2023-03-07 DIAGNOSIS — E11.9 CONTROLLED TYPE 2 DIABETES MELLITUS WITHOUT COMPLICATION, WITHOUT LONG-TERM CURRENT USE OF INSULIN (HCC): ICD-10-CM

## 2023-03-07 DIAGNOSIS — I10 PRIMARY HYPERTENSION: ICD-10-CM

## 2023-03-07 PROCEDURE — 1036F TOBACCO NON-USER: CPT | Performed by: INTERNAL MEDICINE

## 2023-03-07 PROCEDURE — G8427 DOCREV CUR MEDS BY ELIG CLIN: HCPCS | Performed by: INTERNAL MEDICINE

## 2023-03-07 PROCEDURE — G8484 FLU IMMUNIZE NO ADMIN: HCPCS | Performed by: INTERNAL MEDICINE

## 2023-03-07 PROCEDURE — 1123F ACP DISCUSS/DSCN MKR DOCD: CPT | Performed by: INTERNAL MEDICINE

## 2023-03-07 PROCEDURE — 3052F HG A1C>EQUAL 8.0%<EQUAL 9.0%: CPT | Performed by: INTERNAL MEDICINE

## 2023-03-07 PROCEDURE — G8417 CALC BMI ABV UP PARAM F/U: HCPCS | Performed by: INTERNAL MEDICINE

## 2023-03-07 PROCEDURE — 3075F SYST BP GE 130 - 139MM HG: CPT | Performed by: INTERNAL MEDICINE

## 2023-03-07 PROCEDURE — 3078F DIAST BP <80 MM HG: CPT | Performed by: INTERNAL MEDICINE

## 2023-03-07 PROCEDURE — 99214 OFFICE O/P EST MOD 30 MIN: CPT | Performed by: INTERNAL MEDICINE

## 2023-03-07 RX ORDER — LORAZEPAM 0.5 MG/1
0.5 TABLET ORAL PRN
COMMUNITY
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
## 2023-04-18 ENCOUNTER — OFFICE VISIT (OUTPATIENT)
Dept: INTERNAL MEDICINE CLINIC | Age: 83
End: 2023-04-18
Payer: MEDICARE

## 2023-04-18 VITALS
RESPIRATION RATE: 17 BRPM | SYSTOLIC BLOOD PRESSURE: 136 MMHG | DIASTOLIC BLOOD PRESSURE: 64 MMHG | OXYGEN SATURATION: 97 % | HEART RATE: 75 BPM

## 2023-04-18 DIAGNOSIS — M25.512 ACUTE PAIN OF LEFT SHOULDER: Primary | ICD-10-CM

## 2023-04-18 DIAGNOSIS — E11.22 TYPE 2 DIABETES MELLITUS WITH STAGE 3 CHRONIC KIDNEY DISEASE, WITHOUT LONG-TERM CURRENT USE OF INSULIN, UNSPECIFIED WHETHER STAGE 3A OR 3B CKD (HCC): ICD-10-CM

## 2023-04-18 DIAGNOSIS — N18.30 TYPE 2 DIABETES MELLITUS WITH STAGE 3 CHRONIC KIDNEY DISEASE, WITHOUT LONG-TERM CURRENT USE OF INSULIN, UNSPECIFIED WHETHER STAGE 3A OR 3B CKD (HCC): ICD-10-CM

## 2023-04-18 LAB — HBA1C MFR BLD: 8.8 %

## 2023-04-18 PROCEDURE — 83036 HEMOGLOBIN GLYCOSYLATED A1C: CPT | Performed by: INTERNAL MEDICINE

## 2023-04-18 PROCEDURE — G8417 CALC BMI ABV UP PARAM F/U: HCPCS | Performed by: INTERNAL MEDICINE

## 2023-04-18 PROCEDURE — 3078F DIAST BP <80 MM HG: CPT | Performed by: INTERNAL MEDICINE

## 2023-04-18 PROCEDURE — 1036F TOBACCO NON-USER: CPT | Performed by: INTERNAL MEDICINE

## 2023-04-18 PROCEDURE — G8427 DOCREV CUR MEDS BY ELIG CLIN: HCPCS | Performed by: INTERNAL MEDICINE

## 2023-04-18 PROCEDURE — 99213 OFFICE O/P EST LOW 20 MIN: CPT | Performed by: INTERNAL MEDICINE

## 2023-04-18 PROCEDURE — 3075F SYST BP GE 130 - 139MM HG: CPT | Performed by: INTERNAL MEDICINE

## 2023-04-18 PROCEDURE — 3052F HG A1C>EQUAL 8.0%<EQUAL 9.0%: CPT | Performed by: INTERNAL MEDICINE

## 2023-04-18 PROCEDURE — 1123F ACP DISCUSS/DSCN MKR DOCD: CPT | Performed by: INTERNAL MEDICINE

## 2023-04-18 RX ORDER — PIOGLITAZONEHYDROCHLORIDE 30 MG/1
30 TABLET ORAL DAILY
Qty: 90 TABLET | Refills: 1 | Status: SHIPPED | OUTPATIENT
Start: 2023-04-18

## 2023-04-18 RX ORDER — PREDNISONE 1 MG/1
TABLET ORAL
Qty: 21 TABLET | Refills: 0 | Status: SHIPPED | OUTPATIENT
Start: 2023-04-18

## 2023-04-18 NOTE — PATIENT INSTRUCTIONS
belly. Slowly rotate the forearm out away from your body. Keep your elbow and upper arm tucked against the towel roll or the side of your body until you begin to feel tightness in your shoulder. Slowly move your arm back to where you started. Repeat 8 to 12 times. Follow-up care is a key part of your treatment and safety. Be sure to make and go to all appointments, and call your doctor if you are having problems. It's also a good idea to know your test results and keep a list of the medicines you take. Current as of: November 9, 2022               Content Version: 13.6  © 2006-2023 [employer]. Care instructions adapted under license by [facility] ([facility]). If you have questions about a medical condition or this instruction, always ask your healthcare professional. [address] any warranty or liability for your use of this information.
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
## 2023-04-20 ENCOUNTER — HOSPITAL ENCOUNTER (OUTPATIENT)
Age: 83
Discharge: HOME | End: 2023-04-20
Payer: MEDICARE

## 2023-04-20 ENCOUNTER — HOSPITAL ENCOUNTER (OUTPATIENT)
Dept: GENERAL RADIOLOGY | Age: 83
Discharge: HOME | End: 2023-04-20
Payer: MEDICARE

## 2023-04-20 DIAGNOSIS — M25.512 ACUTE PAIN OF LEFT SHOULDER: ICD-10-CM

## 2023-04-20 PROCEDURE — 73030 X-RAY EXAM OF SHOULDER: CPT

## 2023-04-25 ENCOUNTER — OFFICE VISIT (OUTPATIENT)
Dept: INTERNAL MEDICINE CLINIC | Age: 83
End: 2023-04-25
Payer: MEDICARE

## 2023-04-25 VITALS
DIASTOLIC BLOOD PRESSURE: 62 MMHG | HEART RATE: 80 BPM | OXYGEN SATURATION: 95 % | RESPIRATION RATE: 14 BRPM | SYSTOLIC BLOOD PRESSURE: 118 MMHG | WEIGHT: 170 LBS | BODY MASS INDEX: 25.85 KG/M2

## 2023-04-25 DIAGNOSIS — E78.2 MIXED HYPERLIPIDEMIA: ICD-10-CM

## 2023-04-25 DIAGNOSIS — E11.22 TYPE 2 DIABETES MELLITUS WITH STAGE 3B CHRONIC KIDNEY DISEASE, WITHOUT LONG-TERM CURRENT USE OF INSULIN (HCC): Primary | ICD-10-CM

## 2023-04-25 DIAGNOSIS — N18.32 TYPE 2 DIABETES MELLITUS WITH STAGE 3B CHRONIC KIDNEY DISEASE, WITHOUT LONG-TERM CURRENT USE OF INSULIN (HCC): Primary | ICD-10-CM

## 2023-04-25 DIAGNOSIS — I51.81 TAKOTSUBO CARDIOMYOPATHY: ICD-10-CM

## 2023-04-25 DIAGNOSIS — I10 ESSENTIAL HYPERTENSION: ICD-10-CM

## 2023-04-25 PROCEDURE — 3074F SYST BP LT 130 MM HG: CPT | Performed by: INTERNAL MEDICINE

## 2023-04-25 PROCEDURE — 1036F TOBACCO NON-USER: CPT | Performed by: INTERNAL MEDICINE

## 2023-04-25 PROCEDURE — G8417 CALC BMI ABV UP PARAM F/U: HCPCS | Performed by: INTERNAL MEDICINE

## 2023-04-25 PROCEDURE — 1123F ACP DISCUSS/DSCN MKR DOCD: CPT | Performed by: INTERNAL MEDICINE

## 2023-04-25 PROCEDURE — 3052F HG A1C>EQUAL 8.0%<EQUAL 9.0%: CPT | Performed by: INTERNAL MEDICINE

## 2023-04-25 PROCEDURE — 99214 OFFICE O/P EST MOD 30 MIN: CPT | Performed by: INTERNAL MEDICINE

## 2023-04-25 PROCEDURE — 3078F DIAST BP <80 MM HG: CPT | Performed by: INTERNAL MEDICINE

## 2023-04-25 PROCEDURE — G8427 DOCREV CUR MEDS BY ELIG CLIN: HCPCS | Performed by: INTERNAL MEDICINE

## 2023-04-25 SDOH — ECONOMIC STABILITY: FOOD INSECURITY: WITHIN THE PAST 12 MONTHS, YOU WORRIED THAT YOUR FOOD WOULD RUN OUT BEFORE YOU GOT MONEY TO BUY MORE.: NEVER TRUE

## 2023-04-25 SDOH — ECONOMIC STABILITY: FOOD INSECURITY: WITHIN THE PAST 12 MONTHS, THE FOOD YOU BOUGHT JUST DIDN'T LAST AND YOU DIDN'T HAVE MONEY TO GET MORE.: NEVER TRUE

## 2023-04-25 SDOH — ECONOMIC STABILITY: INCOME INSECURITY: HOW HARD IS IT FOR YOU TO PAY FOR THE VERY BASICS LIKE FOOD, HOUSING, MEDICAL CARE, AND HEATING?: NOT HARD AT ALL

## 2023-05-12 DIAGNOSIS — E11.9 CONTROLLED TYPE 2 DIABETES MELLITUS WITHOUT COMPLICATION, WITHOUT LONG-TERM CURRENT USE OF INSULIN (HCC): ICD-10-CM

## 2023-05-12 RX ORDER — GLIPIZIDE 5 MG/1
TABLET, FILM COATED, EXTENDED RELEASE ORAL
Qty: 270 TABLET | Refills: 1 | OUTPATIENT
Start: 2023-05-12

## 2023-05-19 DIAGNOSIS — I10 ESSENTIAL HYPERTENSION: ICD-10-CM

## 2023-05-19 RX ORDER — AMLODIPINE BESYLATE 5 MG/1
TABLET ORAL
Qty: 90 TABLET | Refills: 1 | Status: SHIPPED | OUTPATIENT
Start: 2023-05-19

## 2023-05-23 DIAGNOSIS — E11.9 CONTROLLED TYPE 2 DIABETES MELLITUS WITHOUT COMPLICATION, WITHOUT LONG-TERM CURRENT USE OF INSULIN (HCC): ICD-10-CM

## 2023-05-23 DIAGNOSIS — I10 ESSENTIAL HYPERTENSION: ICD-10-CM

## 2023-05-23 RX ORDER — LISINOPRIL 40 MG/1
TABLET ORAL
Qty: 90 TABLET | Refills: 1 | Status: SHIPPED | OUTPATIENT
Start: 2023-05-23

## 2023-07-15 DIAGNOSIS — N18.30 TYPE 2 DIABETES MELLITUS WITH STAGE 3 CHRONIC KIDNEY DISEASE, WITHOUT LONG-TERM CURRENT USE OF INSULIN, UNSPECIFIED WHETHER STAGE 3A OR 3B CKD (HCC): ICD-10-CM

## 2023-07-15 DIAGNOSIS — E11.9 CONTROLLED TYPE 2 DIABETES MELLITUS WITHOUT COMPLICATION, WITHOUT LONG-TERM CURRENT USE OF INSULIN (HCC): ICD-10-CM

## 2023-07-15 DIAGNOSIS — E11.22 TYPE 2 DIABETES MELLITUS WITH STAGE 3 CHRONIC KIDNEY DISEASE, WITHOUT LONG-TERM CURRENT USE OF INSULIN, UNSPECIFIED WHETHER STAGE 3A OR 3B CKD (HCC): ICD-10-CM

## 2023-07-17 RX ORDER — GLIPIZIDE 10 MG/1
10 TABLET, FILM COATED, EXTENDED RELEASE ORAL 2 TIMES DAILY WITH MEALS
Qty: 180 TABLET | Refills: 1 | OUTPATIENT
Start: 2023-07-17

## 2023-07-17 RX ORDER — GLIPIZIDE 5 MG/1
TABLET, FILM COATED, EXTENDED RELEASE ORAL
Qty: 270 TABLET | Refills: 1 | OUTPATIENT
Start: 2023-07-17

## 2023-07-17 RX ORDER — EMPAGLIFLOZIN 25 MG/1
TABLET, FILM COATED ORAL
Qty: 90 TABLET | Refills: 1 | OUTPATIENT
Start: 2023-07-17

## 2023-07-18 ENCOUNTER — OFFICE VISIT (OUTPATIENT)
Dept: INTERNAL MEDICINE CLINIC | Age: 83
End: 2023-07-18
Payer: MEDICARE

## 2023-07-18 VITALS
HEART RATE: 80 BPM | OXYGEN SATURATION: 97 % | DIASTOLIC BLOOD PRESSURE: 72 MMHG | BODY MASS INDEX: 26.76 KG/M2 | RESPIRATION RATE: 14 BRPM | SYSTOLIC BLOOD PRESSURE: 110 MMHG | WEIGHT: 176 LBS

## 2023-07-18 DIAGNOSIS — C18.9 ADENOCARCINOMA OF COLON (HCC): ICD-10-CM

## 2023-07-18 DIAGNOSIS — I10 ESSENTIAL HYPERTENSION: ICD-10-CM

## 2023-07-18 DIAGNOSIS — N18.30 TYPE 2 DIABETES MELLITUS WITH STAGE 3 CHRONIC KIDNEY DISEASE, WITHOUT LONG-TERM CURRENT USE OF INSULIN, UNSPECIFIED WHETHER STAGE 3A OR 3B CKD (HCC): Primary | ICD-10-CM

## 2023-07-18 DIAGNOSIS — E78.2 MIXED HYPERLIPIDEMIA: ICD-10-CM

## 2023-07-18 DIAGNOSIS — F41.9 ANXIETY: ICD-10-CM

## 2023-07-18 DIAGNOSIS — E11.22 TYPE 2 DIABETES MELLITUS WITH STAGE 3 CHRONIC KIDNEY DISEASE, WITHOUT LONG-TERM CURRENT USE OF INSULIN, UNSPECIFIED WHETHER STAGE 3A OR 3B CKD (HCC): Primary | ICD-10-CM

## 2023-07-18 PROCEDURE — 1036F TOBACCO NON-USER: CPT | Performed by: INTERNAL MEDICINE

## 2023-07-18 PROCEDURE — 3052F HG A1C>EQUAL 8.0%<EQUAL 9.0%: CPT | Performed by: INTERNAL MEDICINE

## 2023-07-18 PROCEDURE — G8417 CALC BMI ABV UP PARAM F/U: HCPCS | Performed by: INTERNAL MEDICINE

## 2023-07-18 PROCEDURE — 3074F SYST BP LT 130 MM HG: CPT | Performed by: INTERNAL MEDICINE

## 2023-07-18 PROCEDURE — 3078F DIAST BP <80 MM HG: CPT | Performed by: INTERNAL MEDICINE

## 2023-07-18 PROCEDURE — 99214 OFFICE O/P EST MOD 30 MIN: CPT | Performed by: INTERNAL MEDICINE

## 2023-07-18 PROCEDURE — G8427 DOCREV CUR MEDS BY ELIG CLIN: HCPCS | Performed by: INTERNAL MEDICINE

## 2023-07-18 PROCEDURE — 1123F ACP DISCUSS/DSCN MKR DOCD: CPT | Performed by: INTERNAL MEDICINE

## 2023-07-18 PROCEDURE — 83037 HB GLYCOSYLATED A1C HOME DEV: CPT | Performed by: INTERNAL MEDICINE

## 2023-07-18 RX ORDER — GLIPIZIDE 10 MG/1
10 TABLET, FILM COATED, EXTENDED RELEASE ORAL 2 TIMES DAILY WITH MEALS
Qty: 180 TABLET | Refills: 1 | Status: SHIPPED | OUTPATIENT
Start: 2023-07-18

## 2023-07-18 RX ORDER — LORAZEPAM 0.5 MG/1
0.5 TABLET ORAL 2 TIMES DAILY PRN
Qty: 30 TABLET | Refills: 0 | Status: SHIPPED | OUTPATIENT
Start: 2023-07-18 | End: 2023-08-17

## 2023-07-27 ENCOUNTER — HOSPITAL ENCOUNTER (OUTPATIENT)
Dept: INFUSION THERAPY | Age: 83
Discharge: HOME | End: 2023-07-27
Payer: MEDICARE

## 2023-07-27 DIAGNOSIS — D72.819 LEUKOPENIA, UNSPECIFIED TYPE: ICD-10-CM

## 2023-07-27 LAB
ALBUMIN SERPL-MCNC: 4.7 GM/DL (ref 3.4–5)
ALP BLD-CCNC: 72 IU/L (ref 40–128)
ALT SERPL-CCNC: 16 U/L (ref 10–40)
ANION GAP SERPL CALCULATED.3IONS-SCNC: 11 MMOL/L (ref 4–16)
AST SERPL-CCNC: 21 IU/L (ref 15–37)
BASOPHILS ABSOLUTE: 0 K/CU MM
BASOPHILS RELATIVE PERCENT: 0.6 % (ref 0–1)
BILIRUB SERPL-MCNC: 0.7 MG/DL (ref 0–1)
BUN SERPL-MCNC: 32 MG/DL (ref 6–23)
CALCIUM SERPL-MCNC: 9 MG/DL (ref 8.3–10.6)
CHLORIDE BLD-SCNC: 103 MMOL/L (ref 99–110)
CO2: 26 MMOL/L (ref 21–32)
CREAT SERPL-MCNC: 1.3 MG/DL (ref 0.9–1.3)
DIFFERENTIAL TYPE: ABNORMAL
EOSINOPHILS ABSOLUTE: 0.1 K/CU MM
EOSINOPHILS RELATIVE PERCENT: 4.2 % (ref 0–3)
GFR SERPL CREATININE-BSD FRML MDRD: 55 ML/MIN/1.73M2
GLUCOSE SERPL-MCNC: 162 MG/DL (ref 70–99)
HCT VFR BLD CALC: 46.2 % (ref 42–52)
HEMOGLOBIN: 15.5 GM/DL (ref 13.5–18)
LYMPHOCYTES ABSOLUTE: 1.2 K/CU MM
LYMPHOCYTES RELATIVE PERCENT: 36.5 % (ref 24–44)
MCH RBC QN AUTO: 32.6 PG (ref 27–31)
MCHC RBC AUTO-ENTMCNC: 33.5 % (ref 32–36)
MCV RBC AUTO: 97.1 FL (ref 78–100)
MONOCYTES ABSOLUTE: 0.3 K/CU MM
MONOCYTES RELATIVE PERCENT: 9.3 % (ref 0–4)
PDW BLD-RTO: 13.7 % (ref 11.7–14.9)
PLATELET # BLD: 172 K/CU MM (ref 140–440)
PMV BLD AUTO: 10.9 FL (ref 7.5–11.1)
POTASSIUM SERPL-SCNC: 4.5 MMOL/L (ref 3.5–5.1)
RBC # BLD: 4.76 M/CU MM (ref 4.6–6.2)
SEGMENTED NEUTROPHILS ABSOLUTE COUNT: 1.7 K/CU MM
SEGMENTED NEUTROPHILS RELATIVE PERCENT: 49.4 % (ref 36–66)
SODIUM BLD-SCNC: 140 MMOL/L (ref 135–145)
TOTAL PROTEIN: 6.3 GM/DL (ref 6.4–8.2)
WBC # BLD: 3.3 K/CU MM (ref 4–10.5)

## 2023-07-27 PROCEDURE — 85025 COMPLETE CBC W/AUTO DIFF WBC: CPT

## 2023-07-27 PROCEDURE — 80053 COMPREHEN METABOLIC PANEL: CPT

## 2023-07-27 PROCEDURE — 36415 COLL VENOUS BLD VENIPUNCTURE: CPT

## 2023-08-07 NOTE — PROGRESS NOTES
Patient Name:  Heri Turcios  Patient :  1940  Patient MRN:  [id number]     Primary Oncologist: Alex Pagan MD  Referring Provider: Evaristo Thomson MD     Date of Service: 2023      Chief Complaint:    Chief Complaint   Patient presents with    Follow-up        He came in for follow up visit. Patient Active Problem List:     Hyperlipidemia     Adenocarcinoma of colon     Atherosclerosis of aorta      Anxiety     Onychomycosis     Controlled type 2 diabetes mellitus without complication, without long-term current use of insulin      Age-related macular degeneration, wet, right eye      Takotsubo cardiomyopathy     Hypertension     Other neutropenia ([address])     HPI:   Omar Acuna is a pleasant 80year old male patient who was referred for evaluation of leukopenia. 2022 creatinine 1.4. LFTs wnl.    2022 WBC: 2.8 (L) RBC: 4.75 Hemoglobin: 15.6 Hematocrit: 46.0 MCV: 96.7 Platelet Count: 045  Basophils %: 0.5 Neutrophils Absolute: 1.1 (L) Lymphocytes Absolute: 1.2 Monocytes Absolute: 0.3 Eosinophils Absolute: 0.2 Basophils Absolute: 0.0    WBC has been fluctuating between 2.8 and 4.7. He denied any frequent infection. I reviewed his medication list.  We discussed about potential bone marrow study to rule out myelodysplasia or other bone marrow diseases. On May 10, 2022 WBC was 2.98, hemoglobin 15.7, platelet 213. Absolute neutrophil was 1.53. He had history of rectal cancer in  status post chemo and radiation therapy. Colonoscopy was in 2021. Follow-up in 3 years. 2022 WBC 2.7, Hg 15.2, plt 169. Retic 1.4. ANC 1.3.    6/15/2022 Bone marrow, biopsy, clot section, and aspirate smears:   -     Hemodilute specimen, see comment. Peripheral blood smear:   -     Leukopenia. Comment   The core biopsy shows bone trabeculae with fat. No cellular bone marrow tissue is seen.  Peripheral blood counts are not low enough to suggest an essentially acellular

## 2023-08-14 ENCOUNTER — OFFICE VISIT (OUTPATIENT)
Dept: ONCOLOGY | Age: 83
End: 2023-08-14
Payer: MEDICARE

## 2023-08-14 ENCOUNTER — HOSPITAL ENCOUNTER (OUTPATIENT)
Dept: INFUSION THERAPY | Age: 83
Discharge: HOME | End: 2023-08-14
Payer: MEDICARE

## 2023-08-14 VITALS
HEIGHT: 68 IN | OXYGEN SATURATION: 97 % | BODY MASS INDEX: 26.25 KG/M2 | TEMPERATURE: 97.9 F | DIASTOLIC BLOOD PRESSURE: 78 MMHG | WEIGHT: 173.2 LBS | SYSTOLIC BLOOD PRESSURE: 160 MMHG | HEART RATE: 88 BPM

## 2023-08-14 DIAGNOSIS — D72.819 LEUKOPENIA, UNSPECIFIED TYPE: Primary | ICD-10-CM

## 2023-08-14 DIAGNOSIS — C18.9 ADENOCARCINOMA OF COLON (HCC): ICD-10-CM

## 2023-08-14 PROCEDURE — 99211 OFF/OP EST MAY X REQ PHY/QHP: CPT

## 2023-08-14 PROCEDURE — G8417 CALC BMI ABV UP PARAM F/U: HCPCS | Performed by: NURSE PRACTITIONER

## 2023-08-14 PROCEDURE — G8427 DOCREV CUR MEDS BY ELIG CLIN: HCPCS | Performed by: NURSE PRACTITIONER

## 2023-08-14 PROCEDURE — 1123F ACP DISCUSS/DSCN MKR DOCD: CPT | Performed by: NURSE PRACTITIONER

## 2023-08-14 PROCEDURE — 1036F TOBACCO NON-USER: CPT | Performed by: NURSE PRACTITIONER

## 2023-08-14 PROCEDURE — 3077F SYST BP >= 140 MM HG: CPT | Performed by: NURSE PRACTITIONER

## 2023-08-14 PROCEDURE — 99214 OFFICE O/P EST MOD 30 MIN: CPT | Performed by: NURSE PRACTITIONER

## 2023-08-14 PROCEDURE — 3078F DIAST BP <80 MM HG: CPT | Performed by: NURSE PRACTITIONER

## 2023-08-14 ASSESSMENT — PATIENT HEALTH QUESTIONNAIRE - PHQ9
2. FEELING DOWN, DEPRESSED OR HOPELESS: 0
SUM OF ALL RESPONSES TO PHQ QUESTIONS 1-9: 0
SUM OF ALL RESPONSES TO PHQ QUESTIONS 1-9: 0
SUM OF ALL RESPONSES TO PHQ9 QUESTIONS 1 & 2: 0
SUM OF ALL RESPONSES TO PHQ QUESTIONS 1-9: 0
SUM OF ALL RESPONSES TO PHQ QUESTIONS 1-9: 0
1. LITTLE INTEREST OR PLEASURE IN DOING THINGS: 0

## 2023-08-14 NOTE — PROGRESS NOTES
MA Rooming Questions  Patient: Jonnie Jara  MRN: [id number]    Date: 8/14/2023        1. Do you have any new issues?   no         2. Do you need any refills on medications?    no    3. Have you had any imaging done since your last visit?   no    4. Have you been hospitalized or seen in the emergency room since your last visit here?   no    5. Did the patient have a depression screening completed today?  Yes    PHQ-9 Total Score: 0 (8/14/2023 11:50 AM)       PHQ-9 Given to (if applicable):               PHQ-9 Score (if applicable):                     [] Positive     [x]  Negative              Does question #9 need addressed (if applicable)                     [] Yes    []  No               Anabell Tipton CMA

## 2023-10-03 ENCOUNTER — OFFICE VISIT (OUTPATIENT)
Dept: CARDIOLOGY CLINIC | Age: 83
End: 2023-10-03
Payer: MEDICARE

## 2023-10-03 VITALS
HEART RATE: 76 BPM | SYSTOLIC BLOOD PRESSURE: 134 MMHG | HEIGHT: 68 IN | WEIGHT: 171.2 LBS | DIASTOLIC BLOOD PRESSURE: 80 MMHG | BODY MASS INDEX: 25.94 KG/M2

## 2023-10-03 DIAGNOSIS — I51.81 TAKOTSUBO CARDIOMYOPATHY: ICD-10-CM

## 2023-10-03 DIAGNOSIS — I10 PRIMARY HYPERTENSION: ICD-10-CM

## 2023-10-03 DIAGNOSIS — E11.65 TYPE 2 DIABETES MELLITUS WITH HYPERGLYCEMIA, WITHOUT LONG-TERM CURRENT USE OF INSULIN (HCC): ICD-10-CM

## 2023-10-03 DIAGNOSIS — I70.0 ATHEROSCLEROSIS OF AORTA (HCC): Primary | ICD-10-CM

## 2023-10-03 DIAGNOSIS — N18.30 STAGE 3 CHRONIC KIDNEY DISEASE, UNSPECIFIED WHETHER STAGE 3A OR 3B CKD (HCC): ICD-10-CM

## 2023-10-03 DIAGNOSIS — E78.2 MIXED HYPERLIPIDEMIA: ICD-10-CM

## 2023-10-03 DIAGNOSIS — E11.9 CONTROLLED TYPE 2 DIABETES MELLITUS WITHOUT COMPLICATION, WITHOUT LONG-TERM CURRENT USE OF INSULIN (HCC): ICD-10-CM

## 2023-10-03 PROCEDURE — 99214 OFFICE O/P EST MOD 30 MIN: CPT | Performed by: INTERNAL MEDICINE

## 2023-10-03 PROCEDURE — G8417 CALC BMI ABV UP PARAM F/U: HCPCS | Performed by: INTERNAL MEDICINE

## 2023-10-03 PROCEDURE — G8484 FLU IMMUNIZE NO ADMIN: HCPCS | Performed by: INTERNAL MEDICINE

## 2023-10-03 PROCEDURE — 3075F SYST BP GE 130 - 139MM HG: CPT | Performed by: INTERNAL MEDICINE

## 2023-10-03 PROCEDURE — 1123F ACP DISCUSS/DSCN MKR DOCD: CPT | Performed by: INTERNAL MEDICINE

## 2023-10-03 PROCEDURE — 1036F TOBACCO NON-USER: CPT | Performed by: INTERNAL MEDICINE

## 2023-10-03 PROCEDURE — 3079F DIAST BP 80-89 MM HG: CPT | Performed by: INTERNAL MEDICINE

## 2023-10-03 PROCEDURE — G8427 DOCREV CUR MEDS BY ELIG CLIN: HCPCS | Performed by: INTERNAL MEDICINE

## 2023-10-03 PROCEDURE — 3052F HG A1C>EQUAL 8.0%<EQUAL 9.0%: CPT | Performed by: INTERNAL MEDICINE

## 2023-10-03 PROCEDURE — 93000 ELECTROCARDIOGRAM COMPLETE: CPT | Performed by: INTERNAL MEDICINE

## 2023-10-03 NOTE — PROGRESS NOTES
(PMI) apex non displaced,no lifts no thrills,S1 and S2 audible, No added heart sounds, No signs of ankle edema, or volume overload, No evidence of JVD  GI:  Bowel sounds normal, Soft, No tenderness, No masses, No gross visceromegaly   :  No costovertebral angle tenderness   Musculoskeletal:  No edema, no tenderness, no deformities. Back- no tenderness  Integument:  Well hydrated, no rash   Lymphatic:  No lymphadenopathy noted   Neurologic:  Alert & oriented x 3, CN 2-12 normal, normal motor function, normal sensory function, no focal deficits noted   Psychiatric:  Speech and behavior appropriate       Medical decision making and Data review:  DATA:  Lab Results   Component Value Date    TROPONINT 0.091 (H) 12/10/2017     BNP:  No results found for: \"PROBNP\"  PT/INR:  No results found for: \"PTINR\"  Lab Results   Component Value Date    LABA1C 8.8 04/18/2023    LABA1C 8.9 02/23/2023     Lab Results   Component Value Date    CHOL 174 02/23/2023    TRIG 81 02/23/2023    HDL 59 02/23/2023    LDLCALC 99 02/23/2023     Lab Results   Component Value Date    ALT 16 07/27/2023    AST 21 07/27/2023     TSH:   Lab Results   Component Value Date    TSH 4.00 02/23/2023     Cath 12/9/17  Procedure Summary   radial access   LV gram suggests takatsobu cardiomyopathy , anterior and mid   wall is akinetic and hyper contractile base and apex with mid   ventricle ballooning   Mid LAD has 20 % stenosis , large dominant circ   RCA gives small pda ( co-dominant )      Angiographic Findings    LMCA: Normal (no stenosis %). LAD: mid LAD has 20 % calcified lesion after diagonal take off  LCx: left dominant vessel , large calibre circ , gives PDA  RCA: co dominant gives small pda    Echo 12/9/17  Summary   Left ventricular systolic function is abnormal.   Ejection fraction is visually estimated at 30-35%. Mid-ventricular hypokinesis/ballooning is present suggesting takotsubo   cardiomyopathy. Grade I diastolic dysfunction.    Essentially

## 2023-10-17 ENCOUNTER — OFFICE VISIT (OUTPATIENT)
Dept: INTERNAL MEDICINE CLINIC | Age: 83
End: 2023-10-17
Payer: MEDICARE

## 2023-10-17 VITALS
BODY MASS INDEX: 26.28 KG/M2 | WEIGHT: 173.4 LBS | HEART RATE: 77 BPM | HEIGHT: 68 IN | DIASTOLIC BLOOD PRESSURE: 70 MMHG | SYSTOLIC BLOOD PRESSURE: 130 MMHG | OXYGEN SATURATION: 98 % | RESPIRATION RATE: 16 BRPM

## 2023-10-17 DIAGNOSIS — F41.9 ANXIETY: ICD-10-CM

## 2023-10-17 DIAGNOSIS — I10 ESSENTIAL HYPERTENSION: ICD-10-CM

## 2023-10-17 DIAGNOSIS — Z23 NEED FOR INFLUENZA VACCINATION: ICD-10-CM

## 2023-10-17 DIAGNOSIS — E11.9 CONTROLLED TYPE 2 DIABETES MELLITUS WITHOUT COMPLICATION, WITHOUT LONG-TERM CURRENT USE OF INSULIN (HCC): Primary | ICD-10-CM

## 2023-10-17 DIAGNOSIS — E78.2 MIXED HYPERLIPIDEMIA: ICD-10-CM

## 2023-10-17 DIAGNOSIS — C18.9 ADENOCARCINOMA OF COLON (HCC): ICD-10-CM

## 2023-10-17 PROCEDURE — G8427 DOCREV CUR MEDS BY ELIG CLIN: HCPCS | Performed by: INTERNAL MEDICINE

## 2023-10-17 PROCEDURE — G8417 CALC BMI ABV UP PARAM F/U: HCPCS | Performed by: INTERNAL MEDICINE

## 2023-10-17 PROCEDURE — 1123F ACP DISCUSS/DSCN MKR DOCD: CPT | Performed by: INTERNAL MEDICINE

## 2023-10-17 PROCEDURE — 99214 OFFICE O/P EST MOD 30 MIN: CPT | Performed by: INTERNAL MEDICINE

## 2023-10-17 PROCEDURE — 1036F TOBACCO NON-USER: CPT | Performed by: INTERNAL MEDICINE

## 2023-10-17 PROCEDURE — G0008 ADMIN INFLUENZA VIRUS VAC: HCPCS | Performed by: INTERNAL MEDICINE

## 2023-10-17 PROCEDURE — 90694 VACC AIIV4 NO PRSRV 0.5ML IM: CPT | Performed by: INTERNAL MEDICINE

## 2023-10-17 PROCEDURE — 3075F SYST BP GE 130 - 139MM HG: CPT | Performed by: INTERNAL MEDICINE

## 2023-10-17 PROCEDURE — 3078F DIAST BP <80 MM HG: CPT | Performed by: INTERNAL MEDICINE

## 2023-10-17 PROCEDURE — G8484 FLU IMMUNIZE NO ADMIN: HCPCS | Performed by: INTERNAL MEDICINE

## 2023-10-17 PROCEDURE — 3052F HG A1C>EQUAL 8.0%<EQUAL 9.0%: CPT | Performed by: INTERNAL MEDICINE

## 2023-10-17 RX ORDER — BLOOD SUGAR DIAGNOSTIC
1 STRIP MISCELLANEOUS 3 TIMES DAILY
Qty: 100 STRIP | Refills: 5 | Status: SHIPPED | OUTPATIENT
Start: 2023-10-17

## 2023-10-17 RX ORDER — LORAZEPAM 0.5 MG/1
0.5 TABLET ORAL 2 TIMES DAILY PRN
Qty: 30 TABLET | Refills: 0 | Status: SHIPPED | OUTPATIENT
Start: 2023-10-17 | End: 2023-11-16

## 2023-10-17 RX ORDER — BIMATOPROST 0.1 MG/ML
SOLUTION/ DROPS OPHTHALMIC
COMMUNITY
Start: 2023-09-11

## 2023-11-01 DIAGNOSIS — I10 ESSENTIAL HYPERTENSION: ICD-10-CM

## 2023-11-01 DIAGNOSIS — E78.2 MIXED HYPERLIPIDEMIA: ICD-10-CM

## 2023-11-01 DIAGNOSIS — E11.9 CONTROLLED TYPE 2 DIABETES MELLITUS WITHOUT COMPLICATION, WITHOUT LONG-TERM CURRENT USE OF INSULIN (HCC): ICD-10-CM

## 2023-11-01 LAB
CHOLEST SERPL-MCNC: 172 MG/DL (ref 0–199)
DEPRECATED RDW RBC AUTO: 13.9 % (ref 12.4–15.4)
HCT VFR BLD AUTO: 47.1 % (ref 40.5–52.5)
HDLC SERPL-MCNC: 64 MG/DL (ref 40–60)
HGB BLD-MCNC: 16.1 G/DL (ref 13.5–17.5)
LDLC SERPL CALC-MCNC: 92 MG/DL
MAGNESIUM SERPL-MCNC: 2.6 MG/DL (ref 1.8–2.4)
MCH RBC QN AUTO: 33.1 PG (ref 26–34)
MCHC RBC AUTO-ENTMCNC: 34.1 G/DL (ref 31–36)
MCV RBC AUTO: 97.1 FL (ref 80–100)
PLATELET # BLD AUTO: 163 K/UL (ref 135–450)
PMV BLD AUTO: 10.2 FL (ref 5–10.5)
RBC # BLD AUTO: 4.86 M/UL (ref 4.2–5.9)
TRIGL SERPL-MCNC: 82 MG/DL (ref 0–150)
TSH SERPL DL<=0.005 MIU/L-ACNC: 2.81 UIU/ML (ref 0.27–4.2)
VLDLC SERPL CALC-MCNC: 16 MG/DL
WBC # BLD AUTO: 3.3 K/UL (ref 4–11)

## 2023-11-01 PROCEDURE — 36415 COLL VENOUS BLD VENIPUNCTURE: CPT | Performed by: INTERNAL MEDICINE

## 2023-11-02 DIAGNOSIS — E11.9 CONTROLLED TYPE 2 DIABETES MELLITUS WITHOUT COMPLICATION, WITHOUT LONG-TERM CURRENT USE OF INSULIN (HCC): Primary | ICD-10-CM

## 2023-11-02 LAB
EST. AVERAGE GLUCOSE BLD GHB EST-MCNC: 205.9 MG/DL
HBA1C MFR BLD: 8.8 %

## 2023-11-02 RX ORDER — PIOGLITAZONEHYDROCHLORIDE 15 MG/1
15 TABLET ORAL DAILY
Qty: 90 TABLET | Refills: 1 | Status: SHIPPED | OUTPATIENT
Start: 2023-11-02

## 2023-11-02 NOTE — RESULT ENCOUNTER NOTE
Chol,  abs appear in stable range, thyroid fxn is nl.  TWO ISSUES THOUGH, 1, MAGNESIUM LEVEL IS HIGH SO ASK IF IS ON ANY SUPPLEMENTS, OR IF IS IN A VITAMIN NEED TO STOP THIS.  2, SUGAR STILL HIGH DESPITE JARDIANCE AND GLIPIZIDE - I'D - rec if ok w ED that we add a low dose of additional med actos 15mg daily.  Ask him to pls try this and we'll then recheck lab at next appt

## 2023-11-16 DIAGNOSIS — I10 ESSENTIAL HYPERTENSION: ICD-10-CM

## 2023-11-17 RX ORDER — AMLODIPINE BESYLATE 5 MG/1
TABLET ORAL
Qty: 90 TABLET | Refills: 1 | Status: SHIPPED | OUTPATIENT
Start: 2023-11-17

## 2023-11-20 DIAGNOSIS — E11.9 CONTROLLED TYPE 2 DIABETES MELLITUS WITHOUT COMPLICATION, WITHOUT LONG-TERM CURRENT USE OF INSULIN (HCC): ICD-10-CM

## 2023-11-20 DIAGNOSIS — E11.22 TYPE 2 DIABETES MELLITUS WITH STAGE 3 CHRONIC KIDNEY DISEASE, WITHOUT LONG-TERM CURRENT USE OF INSULIN, UNSPECIFIED WHETHER STAGE 3A OR 3B CKD (HCC): ICD-10-CM

## 2023-11-20 DIAGNOSIS — N18.30 TYPE 2 DIABETES MELLITUS WITH STAGE 3 CHRONIC KIDNEY DISEASE, WITHOUT LONG-TERM CURRENT USE OF INSULIN, UNSPECIFIED WHETHER STAGE 3A OR 3B CKD (HCC): ICD-10-CM

## 2023-11-20 DIAGNOSIS — I10 ESSENTIAL HYPERTENSION: ICD-10-CM

## 2023-11-21 RX ORDER — HYDROCHLOROTHIAZIDE 25 MG/1
12.5 TABLET ORAL DAILY
Qty: 45 TABLET | Refills: 1 | Status: SHIPPED | OUTPATIENT
Start: 2023-11-21

## 2023-11-21 RX ORDER — EMPAGLIFLOZIN 25 MG/1
25 TABLET, FILM COATED ORAL EVERY MORNING
Qty: 90 TABLET | Refills: 1 | Status: SHIPPED | OUTPATIENT
Start: 2023-11-21

## 2023-11-21 RX ORDER — LISINOPRIL 40 MG/1
TABLET ORAL
Qty: 90 TABLET | Refills: 1 | Status: SHIPPED | OUTPATIENT
Start: 2023-11-21

## 2024-01-16 ENCOUNTER — OFFICE VISIT (OUTPATIENT)
Dept: INTERNAL MEDICINE CLINIC | Age: 84
End: 2024-01-16
Payer: MEDICARE

## 2024-01-16 VITALS
RESPIRATION RATE: 16 BRPM | DIASTOLIC BLOOD PRESSURE: 78 MMHG | HEART RATE: 70 BPM | OXYGEN SATURATION: 97 % | WEIGHT: 174 LBS | SYSTOLIC BLOOD PRESSURE: 138 MMHG | BODY MASS INDEX: 26.37 KG/M2 | HEIGHT: 68 IN

## 2024-01-16 DIAGNOSIS — N18.30 STAGE 3 CHRONIC KIDNEY DISEASE, UNSPECIFIED WHETHER STAGE 3A OR 3B CKD (HCC): ICD-10-CM

## 2024-01-16 DIAGNOSIS — I10 ESSENTIAL HYPERTENSION: ICD-10-CM

## 2024-01-16 DIAGNOSIS — I70.0 ATHEROSCLEROSIS OF AORTA (HCC): ICD-10-CM

## 2024-01-16 DIAGNOSIS — H35.3210 EXUDATIVE AGE-RELATED MACULAR DEGENERATION OF RIGHT EYE, UNSPECIFIED STAGE (HCC): ICD-10-CM

## 2024-01-16 DIAGNOSIS — C18.9 ADENOCARCINOMA OF COLON (HCC): ICD-10-CM

## 2024-01-16 DIAGNOSIS — I51.81 TAKOTSUBO CARDIOMYOPATHY: ICD-10-CM

## 2024-01-16 DIAGNOSIS — E11.22 TYPE 2 DIABETES MELLITUS WITH STAGE 3 CHRONIC KIDNEY DISEASE, WITHOUT LONG-TERM CURRENT USE OF INSULIN, UNSPECIFIED WHETHER STAGE 3A OR 3B CKD (HCC): Primary | ICD-10-CM

## 2024-01-16 DIAGNOSIS — F41.9 ANXIETY: ICD-10-CM

## 2024-01-16 DIAGNOSIS — N18.30 TYPE 2 DIABETES MELLITUS WITH STAGE 3 CHRONIC KIDNEY DISEASE, WITHOUT LONG-TERM CURRENT USE OF INSULIN, UNSPECIFIED WHETHER STAGE 3A OR 3B CKD (HCC): Primary | ICD-10-CM

## 2024-01-16 PROBLEM — D70.8 OTHER NEUTROPENIA (HCC): Status: RESOLVED | Noted: 2022-04-12 | Resolved: 2024-01-16

## 2024-01-16 PROCEDURE — 99214 OFFICE O/P EST MOD 30 MIN: CPT | Performed by: INTERNAL MEDICINE

## 2024-01-16 PROCEDURE — 1036F TOBACCO NON-USER: CPT | Performed by: INTERNAL MEDICINE

## 2024-01-16 PROCEDURE — 1123F ACP DISCUSS/DSCN MKR DOCD: CPT | Performed by: INTERNAL MEDICINE

## 2024-01-16 PROCEDURE — 3078F DIAST BP <80 MM HG: CPT | Performed by: INTERNAL MEDICINE

## 2024-01-16 PROCEDURE — 3075F SYST BP GE 130 - 139MM HG: CPT | Performed by: INTERNAL MEDICINE

## 2024-01-16 PROCEDURE — G8427 DOCREV CUR MEDS BY ELIG CLIN: HCPCS | Performed by: INTERNAL MEDICINE

## 2024-01-16 PROCEDURE — G8484 FLU IMMUNIZE NO ADMIN: HCPCS | Performed by: INTERNAL MEDICINE

## 2024-01-16 PROCEDURE — G8417 CALC BMI ABV UP PARAM F/U: HCPCS | Performed by: INTERNAL MEDICINE

## 2024-01-16 RX ORDER — LORAZEPAM 0.5 MG/1
0.5 TABLET ORAL 2 TIMES DAILY PRN
Qty: 30 TABLET | Refills: 0 | Status: SHIPPED | OUTPATIENT
Start: 2024-01-16 | End: 2024-02-15

## 2024-01-16 RX ORDER — HYDRALAZINE HYDROCHLORIDE 10 MG/1
10 TABLET, FILM COATED ORAL 2 TIMES DAILY PRN
Qty: 60 TABLET | Refills: 3 | Status: SHIPPED | OUTPATIENT
Start: 2024-01-16 | End: 2025-01-15

## 2024-01-16 NOTE — PROGRESS NOTES
Thor Jha  1940 01/16/24    SUBJECTIVE:    Dm- DID NOT START ACTOS FOR CONCERN OF CHF AND WT GAIN, EDEMA.  HE ALSO WANTED TO STOP JARDIANCE WHICH IS CAUSING HIM TO URINATE EXCESSIVELY.  ADVISED HIM SUGARS ARE HIGH, STOPPING MED CAN WORSEN DM CONTROL.  WE MENTIONED ALSO OZEMPIC, HE DEFERS FOR NOW.  A1C IS BETTER TODAY AT 8.2.  Lab Results   Component Value Date    LABA1C 8.8 11/01/2023    LABA1C 8.8 04/18/2023    LABA1C 8.9 02/23/2023     Lab Results   Component Value Date    MALBCR see below 02/23/2023    LDLCALC 92 11/01/2023    CREATININE 1.3 07/27/2023     Hypertension: Stable. Denies CP, SOB, cough, visual changes, dizziness, palpitations or HA.      CHR ANXIETY, STABLE ON ATIVAN AND RF DUE.  Controlled substances monitoring: possible medication side effects, risk of tolerance and/or dependence, and alternative treatments discussed, no signs of potential drug abuse or diversion identified and [place] report reviewed today- activity consistent with treatment plan.      HX COLON CA, DUE FOR F/U DR RAYMUNDO.   HE STATES HAD PAIN DURING PROCEDURE AND ASKED FOR ALTERNATIVE GI SPECIALIST.    EXUDATIVE RETINOPATHY, SEEING DR BEAULIEU.        OBJECTIVE:    /78   Pulse 70   Resp 16   Ht 1.727 m (5' 7.99\")   Wt 78.9 kg (174 lb)   SpO2 97%   BMI 26.46 kg/m²     Physical Exam  Vitals reviewed.   Constitutional:       General: He is not in acute distress.     Appearance: He is well-developed.   HENT:      Head: Normocephalic and atraumatic.      Mouth/Throat:      Mouth: Mucous membranes are moist.      Pharynx: Oropharynx is clear. No oropharyngeal exudate or posterior oropharyngeal erythema.   Eyes:      General: No scleral icterus.        Right eye: No discharge.         Left eye: No discharge.      Conjunctiva/sclera: Conjunctivae normal.      Pupils: Pupils are equal, round, and reactive to light.   Neck:      Thyroid: No thyromegaly.      Vascular: No JVD.      Trachea: No tracheal deviation.

## 2024-01-18 ENCOUNTER — TELEPHONE (OUTPATIENT)
Dept: GASTROENTEROLOGY | Age: 84
End: 2024-01-18

## 2024-01-18 NOTE — TELEPHONE ENCOUNTER
Called pt. In attempts to gina repeat cscope due to hx of adenocarcinoma of colon. LM w/ pt wife for pt to call back when he was home.

## 2024-01-21 NOTE — PROGRESS NOTES
Patient Name:  Thor Jha  Patient :  1940  Patient MRN:  [id number]     Primary Oncologist: Arlene Hlal MD  Referring Provider: Sterling Kaye MD     Date of Service: 2/15/2024      Chief Complaint:    Chief Complaint   Patient presents with    Follow-up      He came in for follow up visit.    Patient Active Problem List:     Hyperlipidemia     Adenocarcinoma of colon     Atherosclerosis of aorta      Anxiety     Onychomycosis     Controlled type 2 diabetes mellitus without complication, without long-term current use of insulin      Age-related macular degeneration, wet, right eye      Takotsubo cardiomyopathy     Hypertension     Other neutropenia (HCC)     HPI:   Thor Jha is a pleasant 83 year old male patient who was referred for evaluation of leukopenia.   2022 creatinine 1.4. LFTs wnl.  2022 WBC: 2.8 (L) RBC: 4.75 Hemoglobin: 15.6 Hematocrit: 46.0 MCV: 96.7 Platelet Count: 149  Basophils %: 0.5 Neutrophils Absolute: 1.1 (L) Lymphocytes Absolute: 1.2 Monocytes Absolute: 0.3 Eosinophils Absolute: 0.2 Basophils Absolute: 0.0    WBC has been fluctuating between 2.8 and 4.7.  He denied any frequent infection.  I reviewed his medication list.  We discussed about potential bone marrow study to rule out myelodysplasia or other bone marrow diseases.  On May 10, 2022 WBC was 2.98, hemoglobin 15.7, platelet 160.  Absolute neutrophil was 1.53.    He had history of rectal cancer in  status post chemo and radiation therapy.  Colonoscopy was in 2021.  Follow-up in 3 years.  2022 WBC 2.7, Hg 15.2, plt 169. Retic 1.4. ANC 1.3.    6/15/2022 Bone marrow, biopsy, clot section, and aspirate smears:   -     Hemodilute specimen, see comment.   Peripheral blood smear:   -     Leukopenia.     Comment   The core biopsy shows bone trabeculae with fat. No cellular bone marrow tissue is seen. Peripheral blood counts are not low enough to suggest an essentially acellular marrow,

## 2024-01-22 ENCOUNTER — TELEPHONE (OUTPATIENT)
Dept: INTERNAL MEDICINE CLINIC | Age: 84
End: 2024-01-22

## 2024-01-22 DIAGNOSIS — N18.30 TYPE 2 DIABETES MELLITUS WITH STAGE 3 CHRONIC KIDNEY DISEASE, WITHOUT LONG-TERM CURRENT USE OF INSULIN, UNSPECIFIED WHETHER STAGE 3A OR 3B CKD (HCC): Primary | ICD-10-CM

## 2024-01-22 DIAGNOSIS — E11.22 TYPE 2 DIABETES MELLITUS WITH STAGE 3 CHRONIC KIDNEY DISEASE, WITHOUT LONG-TERM CURRENT USE OF INSULIN, UNSPECIFIED WHETHER STAGE 3A OR 3B CKD (HCC): Primary | ICD-10-CM

## 2024-01-22 RX ORDER — REPAGLINIDE 1 MG/1
1 TABLET ORAL
Qty: 90 TABLET | Refills: 3 | Status: SHIPPED | OUTPATIENT
Start: 2024-01-22

## 2024-01-22 NOTE — TELEPHONE ENCOUNTER
Patient called state there was an issue with the jardiance and wanted to know if he can go back on Metformin.  Per Dr. Kaye Metformin caused diarrhea.  Dr. Kaye sent in Prandin 1 mg.  Patient notified and expressed understanding

## 2024-01-25 ENCOUNTER — TELEPHONE (OUTPATIENT)
Dept: GASTROENTEROLOGY | Age: 84
End: 2024-01-25

## 2024-01-25 NOTE — TELEPHONE ENCOUNTER
Called pt. In attempts to gina repeat cscope due to hx of adenocarcinoma of colon. LM for pt to call back to make an appt.

## 2024-02-01 ENCOUNTER — TELEPHONE (OUTPATIENT)
Dept: GASTROENTEROLOGY | Age: 84
End: 2024-02-01

## 2024-02-01 NOTE — TELEPHONE ENCOUNTER
Called pt. In attempts to gina repeat cscope due to hx of adenocarcinoma of colon. Pt. Stated he was going to stay with dr. Khan office to be scope at [facility].

## 2024-02-09 ENCOUNTER — TELEPHONE (OUTPATIENT)
Dept: INTERNAL MEDICINE CLINIC | Age: 84
End: 2024-02-09

## 2024-02-09 DIAGNOSIS — E11.9 CONTROLLED TYPE 2 DIABETES MELLITUS WITHOUT COMPLICATION, WITHOUT LONG-TERM CURRENT USE OF INSULIN (HCC): ICD-10-CM

## 2024-02-09 NOTE — TELEPHONE ENCOUNTER
Called pt. Back he feels Prandin not helping. Requesting change back to Metformin.  Glucose 120-170 recently.  Pharmacy [facility] [address] if ok for change.

## 2024-02-13 ENCOUNTER — TELEPHONE (OUTPATIENT)
Dept: INTERNAL MEDICINE CLINIC | Age: 84
End: 2024-02-13

## 2024-02-15 ENCOUNTER — OFFICE VISIT (OUTPATIENT)
Dept: ONCOLOGY | Age: 84
End: 2024-02-15
Payer: MEDICARE

## 2024-02-15 ENCOUNTER — HOSPITAL ENCOUNTER (OUTPATIENT)
Dept: INFUSION THERAPY | Age: 84
Discharge: HOME | End: 2024-02-15
Payer: MEDICARE

## 2024-02-15 VITALS
OXYGEN SATURATION: 97 % | TEMPERATURE: 98.2 F | DIASTOLIC BLOOD PRESSURE: 82 MMHG | BODY MASS INDEX: 26.52 KG/M2 | SYSTOLIC BLOOD PRESSURE: 166 MMHG | HEART RATE: 102 BPM | WEIGHT: 175 LBS | HEIGHT: 68 IN

## 2024-02-15 DIAGNOSIS — D72.819 LEUKOPENIA, UNSPECIFIED TYPE: Primary | ICD-10-CM

## 2024-02-15 DIAGNOSIS — D72.819 LEUKOPENIA, UNSPECIFIED TYPE: ICD-10-CM

## 2024-02-15 LAB
BASOPHILS ABSOLUTE: 0 K/CU MM
BASOPHILS RELATIVE PERCENT: 0.4 % (ref 0–1)
DIFFERENTIAL TYPE: ABNORMAL
EOSINOPHILS ABSOLUTE: 0.1 K/CU MM
EOSINOPHILS RELATIVE PERCENT: 4.5 % (ref 0–3)
HCT VFR BLD CALC: 44 % (ref 42–52)
HEMOGLOBIN: 14.7 GM/DL (ref 13.5–18)
LYMPHOCYTES ABSOLUTE: 0.8 K/CU MM
LYMPHOCYTES RELATIVE PERCENT: 30.7 % (ref 24–44)
MCH RBC QN AUTO: 32.4 PG (ref 27–31)
MCHC RBC AUTO-ENTMCNC: 33.4 % (ref 32–36)
MCV RBC AUTO: 96.9 FL (ref 78–100)
MONOCYTES ABSOLUTE: 0.2 K/CU MM
MONOCYTES RELATIVE PERCENT: 9 % (ref 0–4)
PDW BLD-RTO: 12.9 % (ref 11.7–14.9)
PLATELET # BLD: 171 K/CU MM (ref 140–440)
PMV BLD AUTO: 10.5 FL (ref 7.5–11.1)
RBC # BLD: 4.54 M/CU MM (ref 4.6–6.2)
SEGMENTED NEUTROPHILS ABSOLUTE COUNT: 1.4 K/CU MM
SEGMENTED NEUTROPHILS RELATIVE PERCENT: 55.4 % (ref 36–66)
WBC # BLD: 2.4 K/CU MM (ref 4–10.5)

## 2024-02-15 PROCEDURE — 36415 COLL VENOUS BLD VENIPUNCTURE: CPT

## 2024-02-15 PROCEDURE — 3077F SYST BP >= 140 MM HG: CPT | Performed by: INTERNAL MEDICINE

## 2024-02-15 PROCEDURE — 85025 COMPLETE CBC W/AUTO DIFF WBC: CPT

## 2024-02-15 PROCEDURE — 1036F TOBACCO NON-USER: CPT | Performed by: INTERNAL MEDICINE

## 2024-02-15 PROCEDURE — G8417 CALC BMI ABV UP PARAM F/U: HCPCS | Performed by: INTERNAL MEDICINE

## 2024-02-15 PROCEDURE — 99213 OFFICE O/P EST LOW 20 MIN: CPT | Performed by: INTERNAL MEDICINE

## 2024-02-15 PROCEDURE — G8427 DOCREV CUR MEDS BY ELIG CLIN: HCPCS | Performed by: INTERNAL MEDICINE

## 2024-02-15 PROCEDURE — G8484 FLU IMMUNIZE NO ADMIN: HCPCS | Performed by: INTERNAL MEDICINE

## 2024-02-15 PROCEDURE — 99211 OFF/OP EST MAY X REQ PHY/QHP: CPT

## 2024-02-15 PROCEDURE — 3079F DIAST BP 80-89 MM HG: CPT | Performed by: INTERNAL MEDICINE

## 2024-02-15 PROCEDURE — 1123F ACP DISCUSS/DSCN MKR DOCD: CPT | Performed by: INTERNAL MEDICINE

## 2024-02-15 RX ORDER — PIOGLITAZONEHYDROCHLORIDE 15 MG/1
TABLET ORAL
COMMUNITY
Start: 2024-02-01 | End: 2024-02-15

## 2024-02-15 NOTE — PROGRESS NOTES
MA Rooming Questions  Patient: Thor Jha  MRN: [id number]    Date: 2/15/2024        1. Do you have any new issues?   no         2. Do you need any refills on medications?    no    3. Have you had any imaging done since your last visit?   no    4. Have you been hospitalized or seen in the emergency room since your last visit here?   no    5. Did the patient have a depression screening completed today? Yes    PHQ-9 Total Score: 0 (2/15/2024 10:18 AM)       PHQ-9 Given to (if applicable):               PHQ-9 Score (if applicable):                     [] Positive     []  Negative              Does question #9 need addressed (if applicable)                     [] Yes    []  No               Antoinette Mendiola CMA

## 2024-02-26 ENCOUNTER — OFFICE VISIT (OUTPATIENT)
Dept: INTERNAL MEDICINE CLINIC | Age: 84
End: 2024-02-26
Payer: MEDICARE

## 2024-02-26 VITALS
HEART RATE: 75 BPM | SYSTOLIC BLOOD PRESSURE: 154 MMHG | DIASTOLIC BLOOD PRESSURE: 80 MMHG | OXYGEN SATURATION: 97 % | WEIGHT: 179.4 LBS | BODY MASS INDEX: 27.28 KG/M2

## 2024-02-26 DIAGNOSIS — R35.0 BENIGN PROSTATIC HYPERPLASIA WITH URINARY FREQUENCY: ICD-10-CM

## 2024-02-26 DIAGNOSIS — N40.1 BENIGN PROSTATIC HYPERPLASIA WITH URINARY FREQUENCY: ICD-10-CM

## 2024-02-26 DIAGNOSIS — N18.30 TYPE 2 DIABETES MELLITUS WITH STAGE 3 CHRONIC KIDNEY DISEASE, WITHOUT LONG-TERM CURRENT USE OF INSULIN, UNSPECIFIED WHETHER STAGE 3A OR 3B CKD (HCC): ICD-10-CM

## 2024-02-26 DIAGNOSIS — R06.00 DYSPNEA, UNSPECIFIED TYPE: ICD-10-CM

## 2024-02-26 DIAGNOSIS — E11.22 TYPE 2 DIABETES MELLITUS WITH STAGE 3 CHRONIC KIDNEY DISEASE, WITHOUT LONG-TERM CURRENT USE OF INSULIN, UNSPECIFIED WHETHER STAGE 3A OR 3B CKD (HCC): Primary | ICD-10-CM

## 2024-02-26 DIAGNOSIS — N18.30 TYPE 2 DIABETES MELLITUS WITH STAGE 3 CHRONIC KIDNEY DISEASE, WITHOUT LONG-TERM CURRENT USE OF INSULIN, UNSPECIFIED WHETHER STAGE 3A OR 3B CKD (HCC): Primary | ICD-10-CM

## 2024-02-26 DIAGNOSIS — E11.22 TYPE 2 DIABETES MELLITUS WITH STAGE 3 CHRONIC KIDNEY DISEASE, WITHOUT LONG-TERM CURRENT USE OF INSULIN, UNSPECIFIED WHETHER STAGE 3A OR 3B CKD (HCC): ICD-10-CM

## 2024-02-26 DIAGNOSIS — N39.3 SUI (STRESS URINARY INCONTINENCE), MALE: ICD-10-CM

## 2024-02-26 DIAGNOSIS — I10 ESSENTIAL HYPERTENSION: ICD-10-CM

## 2024-02-26 DIAGNOSIS — I51.81 TAKOTSUBO CARDIOMYOPATHY: ICD-10-CM

## 2024-02-26 DIAGNOSIS — R60.9 PERIPHERAL EDEMA: ICD-10-CM

## 2024-02-26 PROCEDURE — 3077F SYST BP >= 140 MM HG: CPT | Performed by: INTERNAL MEDICINE

## 2024-02-26 PROCEDURE — 1036F TOBACCO NON-USER: CPT | Performed by: INTERNAL MEDICINE

## 2024-02-26 PROCEDURE — 1123F ACP DISCUSS/DSCN MKR DOCD: CPT | Performed by: INTERNAL MEDICINE

## 2024-02-26 PROCEDURE — 3079F DIAST BP 80-89 MM HG: CPT | Performed by: INTERNAL MEDICINE

## 2024-02-26 PROCEDURE — 99214 OFFICE O/P EST MOD 30 MIN: CPT | Performed by: INTERNAL MEDICINE

## 2024-02-26 PROCEDURE — 36415 COLL VENOUS BLD VENIPUNCTURE: CPT | Performed by: INTERNAL MEDICINE

## 2024-02-26 PROCEDURE — G8417 CALC BMI ABV UP PARAM F/U: HCPCS | Performed by: INTERNAL MEDICINE

## 2024-02-26 PROCEDURE — G8427 DOCREV CUR MEDS BY ELIG CLIN: HCPCS | Performed by: INTERNAL MEDICINE

## 2024-02-26 PROCEDURE — G8484 FLU IMMUNIZE NO ADMIN: HCPCS | Performed by: INTERNAL MEDICINE

## 2024-02-26 RX ORDER — BUMETANIDE 0.5 MG/1
0.5 TABLET ORAL DAILY
Qty: 5 TABLET | Refills: 0 | Status: SHIPPED | OUTPATIENT
Start: 2024-02-26

## 2024-02-26 RX ORDER — AMLODIPINE BESYLATE 5 MG/1
5 TABLET ORAL DAILY
Qty: 90 TABLET | Refills: 1
Start: 2024-02-26

## 2024-02-26 RX ORDER — REPAGLINIDE 1 MG/1
1 TABLET ORAL
Qty: 90 TABLET | Refills: 3
Start: 2024-02-26

## 2024-02-26 NOTE — PROGRESS NOTES
Thor Jha  1940 02/26/24    SUBJECTIVE:  HAD HIGH MAGNESIUM LEVEL IN NOV, NOT HAD RECHECK, WE'LL DO SO TODAY.  NOT ON SUPPLEMENTATION.    His main concern is leg swelling noted the past week or so, worse end of the day.    Has needed to wear depends at night.  No dysuria or hematuria, LBP/flank pain fever or chills.     BP high off amlodipine and legs still swelling.  Is on hydralazine BID low dose.  He was willing to switch back/requested to restart amlodipine which was more effective than the current hydralazine.     DM-  sugars improved last mo to 8.1, he was willing to add/retry jardiance now as med not related to urinary sx.    Lab Results   Component Value Date    LABA1C 8.8 11/01/2023    LABA1C 8.8 04/18/2023    LABA1C 8.9 02/23/2023     Lab Results   Component Value Date    MALBCR see below 02/23/2023    LDLCALC 92 11/01/2023    CREATININE 1.3 07/27/2023         OBJECTIVE:    BP (!) 154/80 (Site: Left Upper Arm, Position: Sitting, Cuff Size: Large Adult)   Pulse 75   Wt 81.4 kg (179 lb 6.4 oz)   SpO2 97%   BMI 27.28 kg/m²     Physical Exam  Vitals reviewed.   Constitutional:       General: He is not in acute distress.     Appearance: He is well-developed.   HENT:      Head: Normocephalic and atraumatic.      Mouth/Throat:      Mouth: Mucous membranes are moist.      Pharynx: Oropharynx is clear. No oropharyngeal exudate or posterior oropharyngeal erythema.   Eyes:      General: No scleral icterus.        Right eye: No discharge.         Left eye: No discharge.      Conjunctiva/sclera: Conjunctivae normal.      Pupils: Pupils are equal, round, and reactive to light.   Neck:      Thyroid: No thyromegaly.      Vascular: No JVD.      Trachea: No tracheal deviation.   Cardiovascular:      Rate and Rhythm: Normal rate and regular rhythm.      Heart sounds: Normal heart sounds. No murmur heard.     No friction rub. No gallop.   Pulmonary:      Effort: Pulmonary effort is normal. No respiratory

## 2024-02-26 NOTE — PATIENT INSTRUCTIONS
Med changes:  We'll stop hydralazine  Restart amlodipine   Continue prandin/repaglinide 3x/day  Restart jardiance.    One new pill is bumetanide diuretic, take for 5d only, this will help push off extra water and decrease leg swelling but you'll urinate more the next 5d.    We'll check urine studies to be sure of no infection, also screen for heart failure on a blood test.  Also we'll check psa for prostate enlargement.  If still having problems in a week w urination we'll consider urology evaluation too.

## 2024-02-27 LAB
ALBUMIN SERPL-MCNC: 4.6 G/DL (ref 3.4–5)
ALBUMIN/GLOB SERPL: 2.4 {RATIO} (ref 1.1–2.2)
ALP SERPL-CCNC: 86 U/L (ref 40–129)
ALT SERPL-CCNC: 24 U/L (ref 10–40)
ANION GAP SERPL CALCULATED.3IONS-SCNC: 17 MMOL/L (ref 3–16)
AST SERPL-CCNC: 24 U/L (ref 15–37)
BACTERIA URNS QL MICRO: ABNORMAL /HPF
BILIRUB SERPL-MCNC: 0.4 MG/DL (ref 0–1)
BILIRUB UR QL STRIP.AUTO: NEGATIVE
BUN SERPL-MCNC: 34 MG/DL (ref 7–20)
CALCIUM SERPL-MCNC: 9.7 MG/DL (ref 8.3–10.6)
CHLORIDE SERPL-SCNC: 102 MMOL/L (ref 99–110)
CHOLEST SERPL-MCNC: 172 MG/DL (ref 0–199)
CLARITY UR: CLEAR
CO2 SERPL-SCNC: 26 MMOL/L (ref 21–32)
COLOR UR: YELLOW
CREAT SERPL-MCNC: 1.5 MG/DL (ref 0.8–1.3)
CREAT UR-MCNC: 35 MG/DL (ref 39–259)
DEPRECATED RDW RBC AUTO: 13.9 % (ref 12.4–15.4)
EPI CELLS #/AREA URNS AUTO: 0 /HPF (ref 0–5)
EST. AVERAGE GLUCOSE BLD GHB EST-MCNC: 191.5 MG/DL
GFR SERPLBLD CREATININE-BSD FMLA CKD-EPI: 46 ML/MIN/{1.73_M2}
GLUCOSE SERPL-MCNC: 138 MG/DL (ref 70–99)
GLUCOSE UR STRIP.AUTO-MCNC: >=1000 MG/DL
HBA1C MFR BLD: 8.3 %
HCT VFR BLD AUTO: 42.7 % (ref 40.5–52.5)
HDLC SERPL-MCNC: 63 MG/DL (ref 40–60)
HGB BLD-MCNC: 14.3 G/DL (ref 13.5–17.5)
HGB UR QL STRIP.AUTO: NEGATIVE
HYALINE CASTS #/AREA URNS AUTO: 0 /LPF (ref 0–8)
KETONES UR STRIP.AUTO-MCNC: NEGATIVE MG/DL
LDLC SERPL CALC-MCNC: 89 MG/DL
LEUKOCYTE ESTERASE UR QL STRIP.AUTO: NEGATIVE
MAGNESIUM SERPL-MCNC: 2.1 MG/DL (ref 1.8–2.4)
MCH RBC QN AUTO: 32.6 PG (ref 26–34)
MCHC RBC AUTO-ENTMCNC: 33.4 G/DL (ref 31–36)
MCV RBC AUTO: 97.4 FL (ref 80–100)
MICROALBUMIN UR DL<=1MG/L-MCNC: 2.7 MG/DL
MICROALBUMIN/CREAT UR: 77.1 MG/G (ref 0–30)
NITRITE UR QL STRIP.AUTO: NEGATIVE
NT-PROBNP SERPL-MCNC: 283 PG/ML (ref 0–449)
PH UR STRIP.AUTO: 6 [PH] (ref 5–8)
PLATELET # BLD AUTO: 207 K/UL (ref 135–450)
PMV BLD AUTO: 10.7 FL (ref 5–10.5)
POTASSIUM SERPL-SCNC: 3.9 MMOL/L (ref 3.5–5.1)
PROT SERPL-MCNC: 6.5 G/DL (ref 6.4–8.2)
PROT UR STRIP.AUTO-MCNC: NEGATIVE MG/DL
PSA SERPL DL<=0.01 NG/ML-MCNC: 16.06 NG/ML (ref 0–4)
RBC # BLD AUTO: 4.39 M/UL (ref 4.2–5.9)
RBC CLUMPS #/AREA URNS AUTO: 0 /HPF (ref 0–4)
SODIUM SERPL-SCNC: 145 MMOL/L (ref 136–145)
SP GR UR STRIP.AUTO: 1.01 (ref 1–1.03)
TRIGL SERPL-MCNC: 100 MG/DL (ref 0–150)
UA DIPSTICK W REFLEX MICRO PNL UR: ABNORMAL
URN SPEC COLLECT METH UR: ABNORMAL
UROBILINOGEN UR STRIP-ACNC: 0.2 E.U./DL
VLDLC SERPL CALC-MCNC: 20 MG/DL
WBC # BLD AUTO: 3.8 K/UL (ref 4–11)
WBC #/AREA URNS AUTO: 0 /HPF (ref 0–5)

## 2024-02-27 NOTE — RESULT ENCOUNTER NOTE
Please call pt, several issues noted on lab.  First, sugars are ok but will improve back on jardiance.  Has protein in urine also which should improve w jardiance.  **His psa is significantly elevated, may be from enlarged prostate and we need to r/o prostate cancer so we need to refer to urology group locally, such as Dr Bolanos.    Magnesium level is normal, he appears sl dehydrated so is important to drink 3-4 glasses of water daily too.

## 2024-02-28 ENCOUNTER — TELEPHONE (OUTPATIENT)
Dept: INTERNAL MEDICINE CLINIC | Age: 84
End: 2024-02-28

## 2024-02-28 DIAGNOSIS — R97.20 ELEVATED PSA: Primary | ICD-10-CM

## 2024-02-28 NOTE — TELEPHONE ENCOUNTER
----- Message from Sterling Kaye MD sent at 2/27/2024 12:30 PM EST -----  Please call pt, several issues noted on lab.  First, sugars are ok but will improve back on jardiance.  Has protein in urine also which should improve w jardiance.  ##His psa is significantly elevated, may be from enlarged prostate and we need to r/o prostate cancer so we need to refer to urology group locally, such as Dr Bolanos.    Magnesium level is normal, he appears sl dehydrated so is important to drink 3-4 glasses of water daily too.

## 2024-03-04 ENCOUNTER — OFFICE VISIT (OUTPATIENT)
Dept: INTERNAL MEDICINE CLINIC | Age: 84
End: 2024-03-04
Payer: MEDICARE

## 2024-03-04 VITALS
BODY MASS INDEX: 27.13 KG/M2 | RESPIRATION RATE: 16 BRPM | HEIGHT: 68 IN | WEIGHT: 179 LBS | OXYGEN SATURATION: 97 % | SYSTOLIC BLOOD PRESSURE: 143 MMHG | DIASTOLIC BLOOD PRESSURE: 68 MMHG | HEART RATE: 83 BPM

## 2024-03-04 DIAGNOSIS — E11.22 TYPE 2 DIABETES MELLITUS WITH STAGE 3 CHRONIC KIDNEY DISEASE, WITHOUT LONG-TERM CURRENT USE OF INSULIN, UNSPECIFIED WHETHER STAGE 3A OR 3B CKD (HCC): ICD-10-CM

## 2024-03-04 DIAGNOSIS — R97.20 ELEVATED PSA: Primary | ICD-10-CM

## 2024-03-04 DIAGNOSIS — N18.30 TYPE 2 DIABETES MELLITUS WITH STAGE 3 CHRONIC KIDNEY DISEASE, WITHOUT LONG-TERM CURRENT USE OF INSULIN, UNSPECIFIED WHETHER STAGE 3A OR 3B CKD (HCC): ICD-10-CM

## 2024-03-04 DIAGNOSIS — I51.81 TAKOTSUBO CARDIOMYOPATHY: ICD-10-CM

## 2024-03-04 PROCEDURE — G8484 FLU IMMUNIZE NO ADMIN: HCPCS | Performed by: INTERNAL MEDICINE

## 2024-03-04 PROCEDURE — 3052F HG A1C>EQUAL 8.0%<EQUAL 9.0%: CPT | Performed by: INTERNAL MEDICINE

## 2024-03-04 PROCEDURE — G8417 CALC BMI ABV UP PARAM F/U: HCPCS | Performed by: INTERNAL MEDICINE

## 2024-03-04 PROCEDURE — 3077F SYST BP >= 140 MM HG: CPT | Performed by: INTERNAL MEDICINE

## 2024-03-04 PROCEDURE — 99213 OFFICE O/P EST LOW 20 MIN: CPT | Performed by: INTERNAL MEDICINE

## 2024-03-04 PROCEDURE — G8427 DOCREV CUR MEDS BY ELIG CLIN: HCPCS | Performed by: INTERNAL MEDICINE

## 2024-03-04 PROCEDURE — 1123F ACP DISCUSS/DSCN MKR DOCD: CPT | Performed by: INTERNAL MEDICINE

## 2024-03-04 PROCEDURE — 1036F TOBACCO NON-USER: CPT | Performed by: INTERNAL MEDICINE

## 2024-03-04 PROCEDURE — 3078F DIAST BP <80 MM HG: CPT | Performed by: INTERNAL MEDICINE

## 2024-03-04 NOTE — PROGRESS NOTES
Thor Jha  1940 03/04/24    SUBJECTIVE:    IS BACK ON JARDIANCE AND ALSO AMLODIPINE.  GLC -150 IN AM.    STILL ON PRANDIN TOO.    BP IMPROVED, RECHECK 142/70.  LEGS STILL MILDLY SWOLLEN    Psa IS ELEVATED AND TO SEE DR JAUREGUI THIS WED.  STATES STILL HAS ISSUES W URINARY LEAKAGE, ESPEC AT NIGHT.    OBJECTIVE:    BP (!) 143/68   Pulse 83   Resp 16   Ht 1.727 m (5' 8\")   Wt 81.2 kg (179 lb)   SpO2 97%   BMI 27.22 kg/m²     Physical Exam  Constitutional:       Appearance: Normal appearance.   Cardiovascular:      Rate and Rhythm: Normal rate and regular rhythm.      Heart sounds: No murmur heard.     No gallop.   Pulmonary:      Effort: No respiratory distress.      Breath sounds: No wheezing.   Abdominal:      General: Abdomen is flat. Bowel sounds are normal. There is no distension.      Palpations: Abdomen is soft. There is no mass.      Tenderness: There is no abdominal tenderness.      Hernia: No hernia is present.   Musculoskeletal:      Right lower leg: Edema (r) present.      Left lower leg: Edema (r) present.   Neurological:      Mental Status: He is alert.   Psychiatric:         Mood and Affect: Mood normal.         ASSESSMENT:    1. Elevated PSA    2. Takotsubo cardiomyopathy    3. Type 2 diabetes mellitus with stage 3 chronic kidney disease, without long-term current use of insulin, unspecified whether stage 3a or 3b CKD (HCC)        PLAN:    Thor was seen today for follow-up, diabetes and blood work.    Diagnoses and all orders for this visit:    Elevated PSA- for f/u urology Dr Jauregui    Taksharaubмария cardiomyopathy- he was concerned about cardiac cause for edema, check echo w Dr Pepper to read  -     Echo (TTE) complete (PRN contrast/bubble/strain/3D); Future    Type 2 diabetes mellitus with stage 3 chronic kidney disease, without long-term current use of insulin, unspecified whether stage 3a or 3b CKD (HCC)- recheck ur microalb prior to next appt to see if resolves on

## 2024-03-14 ENCOUNTER — TELEPHONE (OUTPATIENT)
Dept: INTERNAL MEDICINE CLINIC | Age: 84
End: 2024-03-14

## 2024-03-14 DIAGNOSIS — R60.9 PERIPHERAL EDEMA: ICD-10-CM

## 2024-03-14 RX ORDER — BUMETANIDE 0.5 MG/1
0.5 TABLET ORAL DAILY
Qty: 5 TABLET | Refills: 0 | Status: SHIPPED | OUTPATIENT
Start: 2024-03-14

## 2024-03-14 NOTE — TELEPHONE ENCOUNTER
Patient called legs still swelling. He is concerned with urinary dribble.  He also was under the impression you were ordering an MRI for his prostate. Please advise.

## 2024-04-02 ENCOUNTER — TELEPHONE (OUTPATIENT)
Dept: INTERNAL MEDICINE CLINIC | Age: 84
End: 2024-04-02

## 2024-04-02 DIAGNOSIS — R60.9 EDEMA, UNSPECIFIED TYPE: Primary | ICD-10-CM

## 2024-04-02 NOTE — TELEPHONE ENCOUNTER
Patient states the urologist informed him he should be seeing a kidney DR, he would like referred to Dr. Ness.    Patient also still C/O edema.    Moved patient appointment from 04/16 to 04/12 (after testing completed ECHO scheduled 4/4/24, heart house appt 4/9/24 and follow up with urologist 4/12/24 AM)    Please advise

## 2024-04-09 ENCOUNTER — TELEPHONE (OUTPATIENT)
Dept: CARDIOLOGY CLINIC | Age: 84
End: 2024-04-09

## 2024-04-09 NOTE — TELEPHONE ENCOUNTER
Called to patient the results of recent testing echo -     Left Ventricle: Normal left ventricular systolic function with a visually estimated EF of 55 - 60%. Left ventricle size is normal. Normal wall thickness. Normal wall motion. Grade I diastolic dysfunction with normal LAP.    Aortic Valve: Mild regurgitation.    Mitral Valve: Mild regurgitation.    Tricuspid Valve: Mild regurgitation. Normal RVSP.  Patient verbalized understanding of all information given.  Appointment made for him to see Shantelle is going to need some surgery.

## 2024-04-10 ENCOUNTER — TELEPHONE (OUTPATIENT)
Dept: CARDIOLOGY CLINIC | Age: 84
End: 2024-04-10

## 2024-04-10 NOTE — TELEPHONE ENCOUNTER
Cardiologist: Dr. Pepper  Surgeon: Dr. Jauregui  Surgery: Prostate Bx  surgery/procedure should be done in hospital setting:  _____yes     _____no     Anesthesia: General  Date: 5/6/2024  FAX# [phone]    Ph# [phone]    Last OV 10/3/2023 w/Shantelle      1. Atherosclerosis of aorta (HCC)    2. Takotsubo cardiomyopathy    3. Primary hypertension    4. Controlled type 2 diabetes mellitus without complication, without long-term current use of insulin (HCC)    5. Type 2 diabetes mellitus with hyperglycemia, without long-term current use of insulin (HCC)    6. Mixed hyperlipidemia    7. Stage 3 chronic kidney disease, unspecified whether stage 3a or 3b CKD (HCC)          Last EKG - 10/3/2023        NM- none on file      Echo- 4/4/2024  Left Ventricle: Normal left ventricular systolic function with a visually estimated EF of 55 - 60%. Left ventricle size is normal. Normal wall thickness. Normal wall motion. Grade I diastolic dysfunction with normal LAP.    Aortic Valve: Mild regurgitation.    Mitral Valve: Mild regurgitation.    Tricuspid Valve: Mild regurgitation. Normal RVSP. The estimated RVSP is 38 mmHg.    Pericardium: No pericardial effusion.    Image quality is good.         Cath- 12/9/2017   LV gram suggests takatsobu cardiomyopathy , anterior and mid    wall is akinetic and hyper contractile base and apex with mid    ventricle ballooning    Mid LAD has 20 % stenosis , large dominant circ    RCA gives small pda ( co-dominant )           Aspirin

## 2024-04-11 NOTE — TELEPHONE ENCOUNTER
Proceed with surgery no further testing needed   Can hold anticoagulation for 2 days before surgery  Low   risk for surgery  Hold aspirin for procedure   Does not need to be in hospital setting

## 2024-04-12 ENCOUNTER — OFFICE VISIT (OUTPATIENT)
Dept: INTERNAL MEDICINE CLINIC | Age: 84
End: 2024-04-12
Payer: MEDICARE

## 2024-04-12 VITALS
HEART RATE: 49 BPM | OXYGEN SATURATION: 99 % | WEIGHT: 171.8 LBS | SYSTOLIC BLOOD PRESSURE: 120 MMHG | DIASTOLIC BLOOD PRESSURE: 60 MMHG | BODY MASS INDEX: 26.04 KG/M2 | HEIGHT: 68 IN

## 2024-04-12 DIAGNOSIS — E11.9 CONTROLLED TYPE 2 DIABETES MELLITUS WITHOUT COMPLICATION, WITHOUT LONG-TERM CURRENT USE OF INSULIN (HCC): Primary | ICD-10-CM

## 2024-04-12 DIAGNOSIS — M15.9 OSTEOARTHRITIS, GENERALIZED: ICD-10-CM

## 2024-04-12 DIAGNOSIS — N40.2 PROSTATE NODULE: ICD-10-CM

## 2024-04-12 DIAGNOSIS — I51.81 TAKOTSUBO CARDIOMYOPATHY: ICD-10-CM

## 2024-04-12 DIAGNOSIS — F41.9 ANXIETY: ICD-10-CM

## 2024-04-12 PROCEDURE — 3078F DIAST BP <80 MM HG: CPT | Performed by: INTERNAL MEDICINE

## 2024-04-12 PROCEDURE — 99214 OFFICE O/P EST MOD 30 MIN: CPT | Performed by: INTERNAL MEDICINE

## 2024-04-12 PROCEDURE — G8427 DOCREV CUR MEDS BY ELIG CLIN: HCPCS | Performed by: INTERNAL MEDICINE

## 2024-04-12 PROCEDURE — 3074F SYST BP LT 130 MM HG: CPT | Performed by: INTERNAL MEDICINE

## 2024-04-12 PROCEDURE — 1036F TOBACCO NON-USER: CPT | Performed by: INTERNAL MEDICINE

## 2024-04-12 PROCEDURE — 1123F ACP DISCUSS/DSCN MKR DOCD: CPT | Performed by: INTERNAL MEDICINE

## 2024-04-12 PROCEDURE — G8417 CALC BMI ABV UP PARAM F/U: HCPCS | Performed by: INTERNAL MEDICINE

## 2024-04-12 PROCEDURE — 3052F HG A1C>EQUAL 8.0%<EQUAL 9.0%: CPT | Performed by: INTERNAL MEDICINE

## 2024-04-12 RX ORDER — LORAZEPAM 0.5 MG/1
0.5 TABLET ORAL 2 TIMES DAILY PRN
Qty: 30 TABLET | Refills: 0 | Status: SHIPPED | OUTPATIENT
Start: 2024-04-12 | End: 2024-05-12

## 2024-04-12 NOTE — PROGRESS NOTES
Thor FLORES Abhijeet  1940 04/12/24    SUBJECTIVE:    Has prostate lesion for bx Dr Jauregui 5/6, needs cardiac clearance.  Now also w indwelling lazaro, may need one implanted ?around his stomach?-suprapubic?  New procedure apparently.  Also w hydronephrosis on u/s , bladder distension.    Chr anxiety, stable on meds, Controlled substances monitoring: possible medication side effects, risk of tolerance and/or dependence, and alternative treatments discussed, no signs of potential drug abuse or diversion identified and [place]AR[place] report reviewed today- activity consistent with treatment plan.    Cardiomyopathy, seeing Dr Pepper and echo stable, needs cardiac clearance for bx.    DM- last A1c 8.3 and improving.    Lab Results   Component Value Date    LABA1C 8.3 02/26/2024    LABA1C 8.8 11/01/2023    LABA1C 8.8 04/18/2023     Lab Results   Component Value Date    MALBCR 77.1 (H) 02/26/2024    LDLCALC 89 02/26/2024    CREATININE 1.5 (H) 02/26/2024     Osteoarthritis at times limiting his gait, can have some L hip pain, asked for handicap placard which is reasonable        OBJECTIVE:    /60 (Site: Left Upper Arm, Position: Sitting, Cuff Size: Large Adult)   Pulse (!) 49   Ht 1.727 m (5' 7.99\")   Wt 77.9 kg (171 lb 12.8 oz)   SpO2 99%   BMI 26.13 kg/m²     Physical Exam  Vitals reviewed.   Constitutional:       General: He is not in acute distress.     Appearance: He is well-developed.   HENT:      Head: Normocephalic and atraumatic.   Eyes:      General: No scleral icterus.        Right eye: No discharge.         Left eye: No discharge.      Conjunctiva/sclera: Conjunctivae normal.      Pupils: Pupils are equal, round, and reactive to light.   Neck:      Thyroid: No thyromegaly.      Vascular: No JVD.      Trachea: No tracheal deviation.   Cardiovascular:      Rate and Rhythm: Normal rate and regular rhythm.      Heart sounds: Normal heart sounds. No murmur heard.     No friction rub. No gallop.   Pulmonary:

## 2024-05-02 DIAGNOSIS — E11.22 TYPE 2 DIABETES MELLITUS WITH STAGE 3 CHRONIC KIDNEY DISEASE, WITHOUT LONG-TERM CURRENT USE OF INSULIN, UNSPECIFIED WHETHER STAGE 3A OR 3B CKD (HCC): ICD-10-CM

## 2024-05-02 DIAGNOSIS — I10 ESSENTIAL HYPERTENSION: ICD-10-CM

## 2024-05-02 DIAGNOSIS — N18.30 TYPE 2 DIABETES MELLITUS WITH STAGE 3 CHRONIC KIDNEY DISEASE, WITHOUT LONG-TERM CURRENT USE OF INSULIN, UNSPECIFIED WHETHER STAGE 3A OR 3B CKD (HCC): ICD-10-CM

## 2024-05-03 RX ORDER — GLIPIZIDE 10 MG/1
10 TABLET, FILM COATED, EXTENDED RELEASE ORAL 2 TIMES DAILY WITH MEALS
Qty: 180 TABLET | Refills: 1 | Status: SHIPPED | OUTPATIENT
Start: 2024-05-03

## 2024-05-03 RX ORDER — HYDROCHLOROTHIAZIDE 25 MG/1
12.5 TABLET ORAL DAILY
Qty: 45 TABLET | Refills: 1 | Status: SHIPPED | OUTPATIENT
Start: 2024-05-03

## 2024-05-09 DIAGNOSIS — E11.9 CONTROLLED TYPE 2 DIABETES MELLITUS WITHOUT COMPLICATION, WITHOUT LONG-TERM CURRENT USE OF INSULIN (HCC): ICD-10-CM

## 2024-05-09 DIAGNOSIS — I10 ESSENTIAL HYPERTENSION: ICD-10-CM

## 2024-05-09 DIAGNOSIS — B35.1 ONYCHOMYCOSIS: ICD-10-CM

## 2024-05-10 RX ORDER — KETOCONAZOLE 20 MG/G
CREAM TOPICAL
Qty: 30 G | Refills: 1 | Status: SHIPPED | OUTPATIENT
Start: 2024-05-10

## 2024-05-10 RX ORDER — LISINOPRIL 40 MG/1
TABLET ORAL
Qty: 90 TABLET | Refills: 1 | Status: SHIPPED | OUTPATIENT
Start: 2024-05-10

## 2024-05-16 DIAGNOSIS — E11.22 TYPE 2 DIABETES MELLITUS WITH STAGE 3 CHRONIC KIDNEY DISEASE, WITHOUT LONG-TERM CURRENT USE OF INSULIN, UNSPECIFIED WHETHER STAGE 3A OR 3B CKD (HCC): ICD-10-CM

## 2024-05-16 DIAGNOSIS — N18.30 TYPE 2 DIABETES MELLITUS WITH STAGE 3 CHRONIC KIDNEY DISEASE, WITHOUT LONG-TERM CURRENT USE OF INSULIN, UNSPECIFIED WHETHER STAGE 3A OR 3B CKD (HCC): ICD-10-CM

## 2024-05-16 RX ORDER — REPAGLINIDE 1 MG/1
1 TABLET ORAL
Qty: 270 TABLET | Refills: 1 | Status: SHIPPED | OUTPATIENT
Start: 2024-05-16

## 2024-06-05 ENCOUNTER — TELEPHONE (OUTPATIENT)
Dept: INTERNAL MEDICINE CLINIC | Age: 84
End: 2024-06-05

## 2024-06-05 NOTE — TELEPHONE ENCOUNTER
Notified Dr. Kaye.    Ed called concerned about a fever. It was 103 Monday. Monday he did see Urologist and was given ABX. Yesterday down to 101 and today 99.7. Advised pt to keep taking abx as directed. If temp starts to go up to call Urology. If he is unsure who to call ok to call us back.

## 2024-06-10 ENCOUNTER — TELEPHONE (OUTPATIENT)
Dept: INTERNAL MEDICINE CLINIC | Age: 84
End: 2024-06-10

## 2024-06-10 ENCOUNTER — OFFICE VISIT (OUTPATIENT)
Dept: INTERNAL MEDICINE CLINIC | Age: 84
End: 2024-06-10
Payer: MEDICARE

## 2024-06-10 VITALS
RESPIRATION RATE: 16 BRPM | TEMPERATURE: 98.2 F | DIASTOLIC BLOOD PRESSURE: 60 MMHG | SYSTOLIC BLOOD PRESSURE: 110 MMHG | WEIGHT: 175 LBS | BODY MASS INDEX: 26.52 KG/M2 | HEART RATE: 94 BPM | OXYGEN SATURATION: 94 % | HEIGHT: 68 IN

## 2024-06-10 DIAGNOSIS — I10 PRIMARY HYPERTENSION: ICD-10-CM

## 2024-06-10 DIAGNOSIS — C61 PROSTATE CANCER (HCC): ICD-10-CM

## 2024-06-10 DIAGNOSIS — R50.9 FEVER, UNSPECIFIED FEVER CAUSE: ICD-10-CM

## 2024-06-10 DIAGNOSIS — E11.9 CONTROLLED TYPE 2 DIABETES MELLITUS WITHOUT COMPLICATION, WITHOUT LONG-TERM CURRENT USE OF INSULIN (HCC): ICD-10-CM

## 2024-06-10 DIAGNOSIS — A49.8 KLEBSIELLA PNEUMONIAE INFECTION: Primary | ICD-10-CM

## 2024-06-10 PROCEDURE — 3074F SYST BP LT 130 MM HG: CPT | Performed by: INTERNAL MEDICINE

## 2024-06-10 PROCEDURE — G8417 CALC BMI ABV UP PARAM F/U: HCPCS | Performed by: INTERNAL MEDICINE

## 2024-06-10 PROCEDURE — 3078F DIAST BP <80 MM HG: CPT | Performed by: INTERNAL MEDICINE

## 2024-06-10 PROCEDURE — 1123F ACP DISCUSS/DSCN MKR DOCD: CPT | Performed by: INTERNAL MEDICINE

## 2024-06-10 PROCEDURE — 99214 OFFICE O/P EST MOD 30 MIN: CPT | Performed by: INTERNAL MEDICINE

## 2024-06-10 PROCEDURE — 3052F HG A1C>EQUAL 8.0%<EQUAL 9.0%: CPT | Performed by: INTERNAL MEDICINE

## 2024-06-10 PROCEDURE — G8427 DOCREV CUR MEDS BY ELIG CLIN: HCPCS | Performed by: INTERNAL MEDICINE

## 2024-06-10 PROCEDURE — 1036F TOBACCO NON-USER: CPT | Performed by: INTERNAL MEDICINE

## 2024-06-10 RX ORDER — LEVOFLOXACIN 500 MG/1
500 TABLET, FILM COATED ORAL DAILY
Qty: 7 TABLET | Refills: 0 | Status: SHIPPED | OUTPATIENT
Start: 2024-06-10 | End: 2024-06-17

## 2024-06-10 NOTE — PROGRESS NOTES
complication, without long-term current use of insulin (HCC) - DM relatively well controlled and will continue current regimen.  Screening reviewed. See orders.    -     Comprehensive Metabolic Panel; Future  -     Lipid Panel; Future  -     Hemoglobin A1C; Future  -     Urinalysis with Microscopic; Future  -     Microalbumin / Creatinine Urine Ratio; Future

## 2024-06-11 ENCOUNTER — HOSPITAL ENCOUNTER (OUTPATIENT)
Age: 84
Discharge: HOME | End: 2024-06-11
Payer: MEDICARE

## 2024-06-11 ENCOUNTER — TELEPHONE (OUTPATIENT)
Dept: INTERNAL MEDICINE CLINIC | Age: 84
End: 2024-06-11

## 2024-06-11 DIAGNOSIS — A49.8 KLEBSIELLA PNEUMONIAE INFECTION: ICD-10-CM

## 2024-06-11 DIAGNOSIS — I15.9 SECONDARY HYPERTENSION: Primary | ICD-10-CM

## 2024-06-11 DIAGNOSIS — J18.9 PNEUMONIA DUE TO INFECTIOUS ORGANISM, UNSPECIFIED LATERALITY, UNSPECIFIED PART OF LUNG: ICD-10-CM

## 2024-06-11 DIAGNOSIS — R50.9 FEVER, UNSPECIFIED FEVER CAUSE: ICD-10-CM

## 2024-06-11 DIAGNOSIS — E11.9 CONTROLLED TYPE 2 DIABETES MELLITUS WITHOUT COMPLICATION, WITHOUT LONG-TERM CURRENT USE OF INSULIN (HCC): ICD-10-CM

## 2024-06-11 LAB
ALBUMIN SERPL-MCNC: 3.9 GM/DL (ref 3.4–5)
ALP BLD-CCNC: 180 IU/L (ref 40–128)
ALT SERPL-CCNC: 61 U/L (ref 10–40)
ANION GAP SERPL CALCULATED.3IONS-SCNC: 15 MMOL/L (ref 7–16)
AST SERPL-CCNC: 29 IU/L (ref 15–37)
BASOPHILS ABSOLUTE: 0 K/CU MM
BASOPHILS RELATIVE PERCENT: 0.6 % (ref 0–1)
BILIRUB SERPL-MCNC: 0.3 MG/DL (ref 0–1)
BUN SERPL-MCNC: 52 MG/DL (ref 6–23)
CALCIUM SERPL-MCNC: 8.7 MG/DL (ref 8.3–10.6)
CHLORIDE BLD-SCNC: 95 MMOL/L (ref 99–110)
CHOLEST SERPL-MCNC: 146 MG/DL
CO2: 23 MMOL/L (ref 21–32)
CREAT SERPL-MCNC: 2.7 MG/DL (ref 0.9–1.3)
CREAT UR-MCNC: 93.6 MG/DL (ref 39–259)
CRP SERPL HS-MCNC: 80.2 MG/L
DIFFERENTIAL TYPE: ABNORMAL
EOSINOPHILS ABSOLUTE: 0.3 K/CU MM
EOSINOPHILS RELATIVE PERCENT: 5.2 % (ref 0–3)
ESTIMATED AVERAGE GLUCOSE: 223 MG/DL
GFR, ESTIMATED: 23 ML/MIN/1.73M2
GLUCOSE SERPL-MCNC: 340 MG/DL (ref 70–99)
HBA1C MFR BLD: 9.4 % (ref 4.2–6.3)
HCT VFR BLD CALC: 39.1 % (ref 42–52)
HDLC SERPL-MCNC: 28 MG/DL
HEMOGLOBIN: 13 GM/DL (ref 13.5–18)
IMMATURE NEUTROPHIL %: 1.5 % (ref 0–0.43)
LDLC SERPL CALC-MCNC: 74 MG/DL
LYMPHOCYTES ABSOLUTE: 0.9 K/CU MM
LYMPHOCYTES RELATIVE PERCENT: 16 % (ref 24–44)
MCH RBC QN AUTO: 32.2 PG (ref 27–31)
MCHC RBC AUTO-ENTMCNC: 33.2 % (ref 32–36)
MCV RBC AUTO: 96.8 FL (ref 78–100)
MICROALBUMIN 24H UR-MCNC: 23.2 MG/DL
MICROALBUMIN/CREAT UR-RTO: 247.9 MG/G CREAT (ref 0–30)
MONOCYTES ABSOLUTE: 0.4 K/CU MM
MONOCYTES RELATIVE PERCENT: 6.9 % (ref 0–4)
NEUTROPHILS ABSOLUTE: 3.8 K/CU MM
NEUTROPHILS RELATIVE PERCENT: 69.8 % (ref 36–66)
NUCLEATED RBC %: 0 %
PDW BLD-RTO: 13.2 % (ref 11.7–14.9)
PLATELET # BLD: 289 K/CU MM (ref 140–440)
PMV BLD AUTO: 11 FL (ref 7.5–11.1)
POTASSIUM SERPL-SCNC: 5.4 MMOL/L (ref 3.5–5.1)
RBC # BLD: 4.04 M/CU MM (ref 4.6–6.2)
SODIUM BLD-SCNC: 133 MMOL/L (ref 135–145)
TOTAL IMMATURE NEUTOROPHIL: 0.08 K/CU MM
TOTAL NUCLEATED RBC: 0 K/CU MM
TOTAL PROTEIN: 6.2 GM/DL (ref 6.4–8.2)
TOTAL RETICULOCYTE COUNT: 0.05 K/CU MM
TRIGL SERPL-MCNC: 219 MG/DL
WBC # BLD: 5.4 K/CU MM (ref 4–10.5)

## 2024-06-11 PROCEDURE — 80061 LIPID PANEL: CPT

## 2024-06-11 PROCEDURE — 86140 C-REACTIVE PROTEIN: CPT

## 2024-06-11 PROCEDURE — 82570 ASSAY OF URINE CREATININE: CPT

## 2024-06-11 PROCEDURE — 83036 HEMOGLOBIN GLYCOSYLATED A1C: CPT

## 2024-06-11 PROCEDURE — 36415 COLL VENOUS BLD VENIPUNCTURE: CPT

## 2024-06-11 PROCEDURE — 87040 BLOOD CULTURE FOR BACTERIA: CPT

## 2024-06-11 PROCEDURE — 82043 UR ALBUMIN QUANTITATIVE: CPT

## 2024-06-11 PROCEDURE — 80053 COMPREHEN METABOLIC PANEL: CPT

## 2024-06-11 PROCEDURE — 85025 COMPLETE CBC W/AUTO DIFF WBC: CPT

## 2024-06-11 NOTE — TELEPHONE ENCOUNTER
----- Message from Sterling Kaye MD sent at 6/11/2024 12:03 PM EDT -----  Please call pt, - his inflammation level is high suggesting still has infection and prob pneumonia.  Hopefully is responding to the levaquin.  He's dehydrated w decr kidney fxn.  Rec hold several meds- the metformin, hydrochlorothiazide and also lisinopril.  Important to drink 3-4 glasses of water/day.  Need recheck CRP and BMP on Thursday, dx pneumonia and HTN.  We'll also reassess w his appt next week.

## 2024-06-11 NOTE — RESULT ENCOUNTER NOTE
Please call pt, - his inflammation level is high suggesting still has infection and prob pneumonia.  Hopefully is responding to the levaquin.  He's dehydrated w decr kidney fxn.  Rec hold several meds- the metformin, hydrochlorothiazide and also lisinopril.  Important to drink 3-4 glasses of water/day.  Need recheck CRP and BMP on Thursday, dx pneumonia and HTN.  We'll also reassess w his appt next week.

## 2024-06-12 ENCOUNTER — TELEPHONE (OUTPATIENT)
Dept: INTERNAL MEDICINE CLINIC | Age: 84
End: 2024-06-12

## 2024-06-12 NOTE — TELEPHONE ENCOUNTER
Called to check on pt. He said he feels the same, no better, no worse. I advised pt. To call us if he needs anything.

## 2024-06-12 NOTE — RESULT ENCOUNTER NOTE
Please call pt, HE DID HAVE ONE POSITIVE BLOOD CULTURE ON LAB, 1 OF 4, ASK IF IS IMPROVING AFTER ON ABX AND WE ALSO NEED F/U LAB TOMORROW/THURS, - WHICH SHOULD ALREADY BE ORDERED.      ASK HOW HE'S DOING AND LET ME KNOW IF HE'S ANY WORSE.  IF STABLE, ALSO DRINKING MORE WATER AND FEVERS IMPROVING THEN WE'LL JUST KEEP F/U SCHED FOR NEXT WEEK

## 2024-06-13 DIAGNOSIS — I15.9 SECONDARY HYPERTENSION: ICD-10-CM

## 2024-06-13 DIAGNOSIS — J18.9 PNEUMONIA DUE TO INFECTIOUS ORGANISM, UNSPECIFIED LATERALITY, UNSPECIFIED PART OF LUNG: ICD-10-CM

## 2024-06-13 LAB
ANION GAP SERPL CALCULATED.3IONS-SCNC: 13 MMOL/L (ref 3–16)
BUN SERPL-MCNC: 50 MG/DL (ref 7–20)
CALCIUM SERPL-MCNC: 9.1 MG/DL (ref 8.3–10.6)
CHLORIDE SERPL-SCNC: 96 MMOL/L (ref 99–110)
CO2 SERPL-SCNC: 23 MMOL/L (ref 21–32)
CREAT SERPL-MCNC: 1.8 MG/DL (ref 0.8–1.3)
CRP SERPL-MCNC: 37.3 MG/L (ref 0–5.1)
CULTURE: ABNORMAL
CULTURE: ABNORMAL
GFR SERPLBLD CREATININE-BSD FMLA CKD-EPI: 37 ML/MIN/{1.73_M2}
GLUCOSE SERPL-MCNC: 338 MG/DL (ref 70–99)
Lab: ABNORMAL
POTASSIUM SERPL-SCNC: 5.3 MMOL/L (ref 3.5–5.1)
SODIUM SERPL-SCNC: 132 MMOL/L (ref 136–145)
SPECIMEN: ABNORMAL

## 2024-06-13 PROCEDURE — 36415 COLL VENOUS BLD VENIPUNCTURE: CPT | Performed by: INTERNAL MEDICINE

## 2024-06-14 NOTE — RESULT ENCOUNTER NOTE
Please call pt, his kidney fxn and inflammation level are improving rapidly, cont abx and is ok for us to f/u w him as sched next week.

## 2024-06-16 LAB
CULTURE: NORMAL
Lab: NORMAL
SPECIMEN: NORMAL

## 2024-06-17 ENCOUNTER — OFFICE VISIT (OUTPATIENT)
Dept: INTERNAL MEDICINE CLINIC | Age: 84
End: 2024-06-17
Payer: MEDICARE

## 2024-06-17 VITALS
HEART RATE: 75 BPM | DIASTOLIC BLOOD PRESSURE: 68 MMHG | WEIGHT: 170.4 LBS | SYSTOLIC BLOOD PRESSURE: 118 MMHG | HEIGHT: 68 IN | OXYGEN SATURATION: 97 % | BODY MASS INDEX: 25.82 KG/M2

## 2024-06-17 DIAGNOSIS — A49.8 KLEBSIELLA PNEUMONIAE INFECTION: ICD-10-CM

## 2024-06-17 DIAGNOSIS — E11.65 TYPE 2 DIABETES MELLITUS WITH HYPERGLYCEMIA, WITHOUT LONG-TERM CURRENT USE OF INSULIN (HCC): ICD-10-CM

## 2024-06-17 DIAGNOSIS — R50.9 FEVER, UNSPECIFIED FEVER CAUSE: ICD-10-CM

## 2024-06-17 DIAGNOSIS — C61 PROSTATE CANCER (HCC): ICD-10-CM

## 2024-06-17 DIAGNOSIS — R78.81 BACTEREMIA: Primary | ICD-10-CM

## 2024-06-17 PROCEDURE — 3046F HEMOGLOBIN A1C LEVEL >9.0%: CPT | Performed by: INTERNAL MEDICINE

## 2024-06-17 PROCEDURE — 1036F TOBACCO NON-USER: CPT | Performed by: INTERNAL MEDICINE

## 2024-06-17 PROCEDURE — 3074F SYST BP LT 130 MM HG: CPT | Performed by: INTERNAL MEDICINE

## 2024-06-17 PROCEDURE — G8417 CALC BMI ABV UP PARAM F/U: HCPCS | Performed by: INTERNAL MEDICINE

## 2024-06-17 PROCEDURE — 1123F ACP DISCUSS/DSCN MKR DOCD: CPT | Performed by: INTERNAL MEDICINE

## 2024-06-17 PROCEDURE — 99214 OFFICE O/P EST MOD 30 MIN: CPT | Performed by: INTERNAL MEDICINE

## 2024-06-17 PROCEDURE — 3078F DIAST BP <80 MM HG: CPT | Performed by: INTERNAL MEDICINE

## 2024-06-17 PROCEDURE — G8427 DOCREV CUR MEDS BY ELIG CLIN: HCPCS | Performed by: INTERNAL MEDICINE

## 2024-06-17 RX ORDER — LEVOFLOXACIN 500 MG/1
500 TABLET, FILM COATED ORAL DAILY
Qty: 7 TABLET | Refills: 0 | Status: SHIPPED | OUTPATIENT
Start: 2024-06-17 | End: 2024-06-24

## 2024-06-17 SDOH — ECONOMIC STABILITY: FOOD INSECURITY: WITHIN THE PAST 12 MONTHS, YOU WORRIED THAT YOUR FOOD WOULD RUN OUT BEFORE YOU GOT MONEY TO BUY MORE.: NEVER TRUE

## 2024-06-17 SDOH — ECONOMIC STABILITY: INCOME INSECURITY: HOW HARD IS IT FOR YOU TO PAY FOR THE VERY BASICS LIKE FOOD, HOUSING, MEDICAL CARE, AND HEATING?: NOT HARD AT ALL

## 2024-06-17 SDOH — ECONOMIC STABILITY: FOOD INSECURITY: WITHIN THE PAST 12 MONTHS, THE FOOD YOU BOUGHT JUST DIDN'T LAST AND YOU DIDN'T HAVE MONEY TO GET MORE.: NEVER TRUE

## 2024-06-17 NOTE — PATIENT INSTRUCTIONS
Take one more week of levaquin which will finish next TUE.  Then repeat labs incl blood cultures and urine on THURS, you'll see me back for appt on FRI

## 2024-06-17 NOTE — PROGRESS NOTES
Thor Jha  1940 06/17/24    SUBJECTIVE:    Still was ill from prior K pneumonia UTI, on levaquin ,still having some fevers to ~100 at night.  Had elevated crp and FREDA but w f/u lab later last week improving.    Sugars also high last time, up to 300s last week but this am 183.  Lab Results   Component Value Date    LABA1C 9.4 (H) 06/11/2024    LABA1C 8.3 02/26/2024    LABA1C 8.8 11/01/2023     Lab Results   Component Value Date    MALBCR 247.9 (H) 06/11/2024    CREATININE 1.8 (H) 06/13/2024     Prostate CA, has sched for PET ALSO NEXT WEEK THURS.  He is hesitant about active surveillance, if other treatment options may yet be available    OBJECTIVE:    /68 (Site: Left Upper Arm, Position: Sitting, Cuff Size: Medium Adult)   Pulse 75   Ht 1.72 m (5' 7.72\")   Wt 77.3 kg (170 lb 6.4 oz)   SpO2 97%   BMI 26.13 kg/m²     Physical Exam  Constitutional:       Appearance: Normal appearance.   HENT:      Head: Normocephalic and atraumatic.   Eyes:      Extraocular Movements: Extraocular movements intact.      Conjunctiva/sclera: Conjunctivae normal.      Pupils: Pupils are equal, round, and reactive to light.   Cardiovascular:      Rate and Rhythm: Normal rate and regular rhythm.      Heart sounds: Normal heart sounds. No murmur heard.  Pulmonary:      Effort: Pulmonary effort is normal. No respiratory distress.      Breath sounds: Normal breath sounds.   Abdominal:      General: Abdomen is flat. Bowel sounds are normal. There is no distension.      Palpations: Abdomen is soft. There is no mass.      Tenderness: There is no abdominal tenderness.      Hernia: No hernia is present.   Musculoskeletal:      Cervical back: Neck supple.      Right lower leg: No edema.      Left lower leg: No edema.   Neurological:      General: No focal deficit present.      Mental Status: He is alert.   Psychiatric:         Mood and Affect: Mood normal.         ASSESSMENT:    1. Bacteremia    2. Klebsiella pneumoniae

## 2024-06-23 NOTE — PROGRESS NOTES
Patient Name:  Thor Jha  Patient :  1940  Patient MRN:  [id number]     Primary Oncologist: Arlene Hall MD  Referring Provider: Sterling Kaye MD     Date of Service: 2024      Chief Complaint:    No chief complaint on file.     He came in for follow up visit.    Patient Active Problem List:     Hyperlipidemia     Adenocarcinoma of colon     Atherosclerosis of aorta      Anxiety     Onychomycosis     Controlled type 2 diabetes mellitus without complication, without long-term current use of insulin      Age-related macular degeneration, wet, right eye      Takotsubo cardiomyopathy     Hypertension     Other neutropenia (HCC)     HPI:   Thor Jha is a pleasant 83 year old male patient who was referred for evaluation of leukopenia.   2022 creatinine 1.4. LFTs wnl.  2022 WBC: 2.8 (L) RBC: 4.75 Hemoglobin: 15.6 Hematocrit: 46.0 MCV: 96.7 Platelet Count: 149  Basophils %: 0.5 Neutrophils Absolute: 1.1 (L) Lymphocytes Absolute: 1.2 Monocytes Absolute: 0.3 Eosinophils Absolute: 0.2 Basophils Absolute: 0.0    WBC has been fluctuating between 2.8 and 4.7.  He denied any frequent infection.  I reviewed his medication list.  We discussed about potential bone marrow study to rule out myelodysplasia or other bone marrow diseases.  May 10, 2022 WBC 2.98, hemoglobin 15.7, platelet 160.  Absolute neutrophil 1.53.    He had history of rectal cancer in  status post chemo and radiation therapy.  Colonoscopy was in 2021.  Follow-up in 3 years.  2022 WBC 2.7, Hg 15.2, plt 169. Retic 1.4. ANC 1.3.    6/15/2022 Bone marrow, biopsy, clot section, and aspirate smears:   -     Hemodilute specimen, see comment.   Peripheral blood smear:   -     Leukopenia.     Comment   The core biopsy shows bone trabeculae with fat. No cellular bone marrow tissue is seen. Peripheral blood counts are not low enough to suggest an essentially acellular marrow, therefore, the biopsy may have been

## 2024-06-26 ENCOUNTER — HOSPITAL ENCOUNTER (OUTPATIENT)
Age: 84
Discharge: HOME | End: 2024-06-26
Payer: MEDICARE

## 2024-06-26 ENCOUNTER — TELEPHONE (OUTPATIENT)
Dept: INTERNAL MEDICINE CLINIC | Age: 84
End: 2024-06-26

## 2024-06-26 DIAGNOSIS — A49.8 KLEBSIELLA PNEUMONIAE INFECTION: ICD-10-CM

## 2024-06-26 DIAGNOSIS — R50.9 FEVER, UNSPECIFIED FEVER CAUSE: ICD-10-CM

## 2024-06-26 DIAGNOSIS — N30.00 ACUTE CYSTITIS WITHOUT HEMATURIA: Primary | ICD-10-CM

## 2024-06-26 DIAGNOSIS — R78.81 BACTEREMIA: ICD-10-CM

## 2024-06-26 LAB
ALBUMIN SERPL-MCNC: 3.9 GM/DL (ref 3.4–5)
ALP BLD-CCNC: 113 IU/L (ref 40–128)
ALT SERPL-CCNC: 13 U/L (ref 10–40)
ANION GAP SERPL CALCULATED.3IONS-SCNC: 14 MMOL/L (ref 7–16)
AST SERPL-CCNC: 18 IU/L (ref 15–37)
BASOPHILS ABSOLUTE: 0.1 K/CU MM
BASOPHILS RELATIVE PERCENT: 1.2 % (ref 0–1)
BILIRUB SERPL-MCNC: 0.4 MG/DL (ref 0–1)
BILIRUBIN, URINE: NEGATIVE MG/DL
BLOOD, URINE: ABNORMAL
BUN SERPL-MCNC: 23 MG/DL (ref 6–23)
CALCIUM SERPL-MCNC: 8.7 MG/DL (ref 8.3–10.6)
CHLORIDE BLD-SCNC: 104 MMOL/L (ref 99–110)
CLARITY: ABNORMAL
CO2: 22 MMOL/L (ref 21–32)
COLOR: YELLOW
CREAT SERPL-MCNC: 1.3 MG/DL (ref 0.9–1.3)
CRP SERPL HS-MCNC: 16.7 MG/L
DIFFERENTIAL TYPE: ABNORMAL
EOSINOPHILS ABSOLUTE: 0.1 K/CU MM
EOSINOPHILS RELATIVE PERCENT: 1.8 % (ref 0–3)
GFR, ESTIMATED: 55 ML/MIN/1.73M2
GLUCOSE SERPL-MCNC: 137 MG/DL (ref 70–99)
GLUCOSE URINE: >1000 MG/DL
HCT VFR BLD CALC: 38.5 % (ref 42–52)
HEMOGLOBIN: 12.4 GM/DL (ref 13.5–18)
IMMATURE NEUTROPHIL %: 0.2 % (ref 0–0.43)
KETONES, URINE: NEGATIVE MG/DL
LEUKOCYTE ESTERASE, URINE: ABNORMAL
LYMPHOCYTES ABSOLUTE: 1.6 K/CU MM
LYMPHOCYTES RELATIVE PERCENT: 31.9 % (ref 24–44)
MCH RBC QN AUTO: 31.7 PG (ref 27–31)
MCHC RBC AUTO-ENTMCNC: 32.2 % (ref 32–36)
MCV RBC AUTO: 98.5 FL (ref 78–100)
MONOCYTES ABSOLUTE: 0.4 K/CU MM
MONOCYTES RELATIVE PERCENT: 8.7 % (ref 0–4)
NEUTROPHILS ABSOLUTE: 2.8 K/CU MM
NEUTROPHILS RELATIVE PERCENT: 56.2 % (ref 36–66)
NITRITE URINE, QUANTITATIVE: NEGATIVE
NUCLEATED RBC %: 0 %
PDW BLD-RTO: 14.2 % (ref 11.7–14.9)
PH, URINE: 5.5 (ref 5–8)
PLATELET # BLD: 270 K/CU MM (ref 140–440)
PMV BLD AUTO: 10.7 FL (ref 7.5–11.1)
POTASSIUM SERPL-SCNC: 5 MMOL/L (ref 3.5–5.1)
PROTEIN UA: 100 MG/DL
RBC # BLD: 3.91 M/CU MM (ref 4.6–6.2)
SODIUM BLD-SCNC: 140 MMOL/L (ref 135–145)
SPECIFIC GRAVITY UA: >1.03 (ref 1–1.03)
TOTAL IMMATURE NEUTOROPHIL: 0.01 K/CU MM
TOTAL NUCLEATED RBC: 0 K/CU MM
TOTAL PROTEIN: 6.6 GM/DL (ref 6.4–8.2)
UROBILINOGEN, URINE: 0.2 MG/DL (ref 0.2–1)
WBC # BLD: 5 K/CU MM (ref 4–10.5)

## 2024-06-26 PROCEDURE — 36415 COLL VENOUS BLD VENIPUNCTURE: CPT

## 2024-06-26 PROCEDURE — 81001 URINALYSIS AUTO W/SCOPE: CPT

## 2024-06-26 PROCEDURE — 85025 COMPLETE CBC W/AUTO DIFF WBC: CPT

## 2024-06-26 PROCEDURE — 86140 C-REACTIVE PROTEIN: CPT

## 2024-06-26 PROCEDURE — 87086 URINE CULTURE/COLONY COUNT: CPT

## 2024-06-26 PROCEDURE — 80053 COMPREHEN METABOLIC PANEL: CPT

## 2024-06-26 PROCEDURE — 81003 URINALYSIS AUTO W/O SCOPE: CPT

## 2024-06-26 NOTE — TELEPHONE ENCOUNTER
----- Message from Sterling Kaye MD sent at 6/26/2024  4:11 PM EDT -----  Please call pt, his kidney fxn and inflammation levels are much better, continue to normalize.  We'll assess at appt end of this week.  PLS ADD URINE CULTURE TO BE SURE OF NO INFECTION,DX ACUTE CYSTITIS

## 2024-06-26 NOTE — RESULT ENCOUNTER NOTE
Please call pt, his kidney fxn and inflammation levels are much better, continue to normalize.  We'll assess at appt end of this week.  PLS ADD URINE CULTURE TO BE SURE OF NO INFECTION,DX ACUTE CYSTITIS

## 2024-06-27 ENCOUNTER — HOSPITAL ENCOUNTER (OUTPATIENT)
Dept: PET IMAGING | Age: 84
Discharge: HOME | End: 2024-06-27
Attending: UROLOGY
Payer: MEDICARE

## 2024-06-27 DIAGNOSIS — C61 MALIGNANT NEOPLASM OF PROSTATE (HCC): ICD-10-CM

## 2024-06-27 PROCEDURE — 2580000003 HC RX 258: Performed by: UROLOGY

## 2024-06-27 PROCEDURE — 3430000000 HC RX DIAGNOSTIC RADIOPHARMACEUTICAL: Performed by: UROLOGY

## 2024-06-27 PROCEDURE — 78815 PET IMAGE W/CT SKULL-THIGH: CPT

## 2024-06-27 PROCEDURE — A9595 HC RX DIAGNOSTIC RADIOPHARMACEUTICAL: HCPCS | Performed by: UROLOGY

## 2024-06-27 RX ORDER — SODIUM CHLORIDE 0.9 % (FLUSH) 0.9 %
10 SYRINGE (ML) INJECTION PRN
Status: COMPLETED | OUTPATIENT
Start: 2024-06-27 | End: 2024-06-27

## 2024-06-27 RX ADMIN — PIFLUFOLASTAT F-18 9.16 MILLICURIE: 80 INJECTION INTRAVENOUS at 10:44

## 2024-06-27 RX ADMIN — SODIUM CHLORIDE, PRESERVATIVE FREE 10 ML: 5 INJECTION INTRAVENOUS at 10:44

## 2024-06-28 ENCOUNTER — OFFICE VISIT (OUTPATIENT)
Dept: INTERNAL MEDICINE CLINIC | Age: 84
End: 2024-06-28
Payer: MEDICARE

## 2024-06-28 VITALS
WEIGHT: 169 LBS | RESPIRATION RATE: 16 BRPM | SYSTOLIC BLOOD PRESSURE: 106 MMHG | BODY MASS INDEX: 25.61 KG/M2 | HEIGHT: 68 IN | HEART RATE: 61 BPM | OXYGEN SATURATION: 97 % | DIASTOLIC BLOOD PRESSURE: 64 MMHG

## 2024-06-28 VITALS
OXYGEN SATURATION: 97 % | DIASTOLIC BLOOD PRESSURE: 64 MMHG | HEIGHT: 67 IN | BODY MASS INDEX: 26.54 KG/M2 | TEMPERATURE: 98.2 F | WEIGHT: 169.09 LBS | SYSTOLIC BLOOD PRESSURE: 106 MMHG | RESPIRATION RATE: 16 BRPM | HEART RATE: 61 BPM

## 2024-06-28 DIAGNOSIS — B37.49 CANDIDA UTI: Primary | ICD-10-CM

## 2024-06-28 DIAGNOSIS — F41.9 ANXIETY: ICD-10-CM

## 2024-06-28 DIAGNOSIS — Z00.00 MEDICARE ANNUAL WELLNESS VISIT, SUBSEQUENT: Primary | ICD-10-CM

## 2024-06-28 DIAGNOSIS — A49.8 KLEBSIELLA PNEUMONIAE INFECTION: ICD-10-CM

## 2024-06-28 DIAGNOSIS — C61 PROSTATE CANCER (HCC): ICD-10-CM

## 2024-06-28 LAB
CULTURE: ABNORMAL
CULTURE: ABNORMAL
Lab: ABNORMAL
SPECIMEN: ABNORMAL

## 2024-06-28 PROCEDURE — 3078F DIAST BP <80 MM HG: CPT | Performed by: INTERNAL MEDICINE

## 2024-06-28 PROCEDURE — G8427 DOCREV CUR MEDS BY ELIG CLIN: HCPCS | Performed by: INTERNAL MEDICINE

## 2024-06-28 PROCEDURE — G8417 CALC BMI ABV UP PARAM F/U: HCPCS | Performed by: INTERNAL MEDICINE

## 2024-06-28 PROCEDURE — 99213 OFFICE O/P EST LOW 20 MIN: CPT | Performed by: INTERNAL MEDICINE

## 2024-06-28 PROCEDURE — G0439 PPPS, SUBSEQ VISIT: HCPCS | Performed by: INTERNAL MEDICINE

## 2024-06-28 PROCEDURE — 3074F SYST BP LT 130 MM HG: CPT | Performed by: INTERNAL MEDICINE

## 2024-06-28 PROCEDURE — 1036F TOBACCO NON-USER: CPT | Performed by: INTERNAL MEDICINE

## 2024-06-28 PROCEDURE — 1123F ACP DISCUSS/DSCN MKR DOCD: CPT | Performed by: INTERNAL MEDICINE

## 2024-06-28 RX ORDER — FLUCONAZOLE 100 MG/1
100 TABLET ORAL DAILY
Qty: 7 TABLET | Refills: 0 | Status: SHIPPED | OUTPATIENT
Start: 2024-06-28 | End: 2024-07-05

## 2024-06-28 RX ORDER — LORAZEPAM 0.5 MG/1
0.5 TABLET ORAL 2 TIMES DAILY PRN
Qty: 30 TABLET | Refills: 0 | Status: SHIPPED | OUTPATIENT
Start: 2024-06-28 | End: 2024-07-28

## 2024-06-28 ASSESSMENT — PATIENT HEALTH QUESTIONNAIRE - PHQ9
SUM OF ALL RESPONSES TO PHQ QUESTIONS 1-9: 0
SUM OF ALL RESPONSES TO PHQ QUESTIONS 1-9: 0
2. FEELING DOWN, DEPRESSED OR HOPELESS: NOT AT ALL
SUM OF ALL RESPONSES TO PHQ QUESTIONS 1-9: 0
SUM OF ALL RESPONSES TO PHQ QUESTIONS 1-9: 0
SUM OF ALL RESPONSES TO PHQ9 QUESTIONS 1 & 2: 0
1. LITTLE INTEREST OR PLEASURE IN DOING THINGS: NOT AT ALL

## 2024-06-28 NOTE — PROGRESS NOTES
Medicare Annual Wellness Visit    Thor Jha is here for Medicare AWV    Assessment & Plan   Medicare annual wellness visit, subsequent  Recommendations for Preventive Services Due: see orders and patient instructions/AVS.  Recommended screening schedule for the next 5-10 years is provided to the patient in written form: see Patient Instructions/AVS.     Return in 1 year (on 6/28/2025).     Subjective       Patient's complete Health Risk Assessment and screening values have been reviewed and are found in Flowsheets. The following problems were reviewed today and where indicated follow up appointments were made and/or referrals ordered.    Positive Risk Factor Screenings with Interventions:    Fall Risk:  Do you feel unsteady or are you worried about falling? : no  2 or more falls in past year?: (!) yes (falls due to pain in back)  Fall with injury in past year?: no     Interventions:    Reviewed medications, home hazards, visual acuity, and co-morbidities that can increase risk for falls              Dentist Screen:  Have you seen the dentist within the past year?: (!) No    Intervention:  Advised to schedule with their dentist      Safety:  Do you have non-slip mats or non-slip surfaces or shower bars or grab bars in your shower or bathtub?: (!) No  Interventions:  Patient declined any further interventions or treatment     Advanced Directives:  Do you have a Living Will?: (!) No    Intervention:  ALSO IS TO CONSIDER LW                     Objective   Vitals:    06/28/24 1404   BP: 106/64   Pulse: 61   Resp: 16   Temp: 98.2 °F (36.8 °C)   SpO2: 97%   Weight: 76.7 kg (169 lb 1.5 oz)   Height: 1.702 m (5' 7\")      Body mass index is 26.48 kg/m².               Allergies   Allergen Reactions    Atenolol Other (See Comments)     DIZZINESS, SWEATY- INCR ANXIETY AND INSOMNIA    Crestor [Rosuvastatin Calcium]      Cannot take everyday upsets stomach    Lovastatin      Can not tolerate 10 mg but can tolerate 5 mg

## 2024-06-28 NOTE — PROGRESS NOTES
Thor Jha  1940 06/28/24    SUBJECTIVE:    PET noted and w prob R and L prostate CA.  At this point Dr Soler is monitoring/active surveillance, but he would like eval w Dr Locke if could be candidate for RT, did have RT for colon CA in the past.    Prior bladder infection ckeared after abx but now w candida in urine. No dysuria noted, fever, chills.    Some anxiety w doctor visits, taking ativan prn.  Rf requested.  Controlled substances monitoring: possible medication side effects, risk of tolerance and/or dependence, and alternative treatments discussed, no signs of potential drug abuse or diversion identified and OARRS report reviewed today- activity consistent with treatment plan.    OBJECTIVE:    /64   Pulse 61   Resp 16   Ht 1.72 m (5' 7.72\")   Wt 76.7 kg (169 lb)   SpO2 97%   BMI 25.91 kg/m²     Physical Exam  Constitutional:       Appearance: Normal appearance.   Cardiovascular:      Rate and Rhythm: Normal rate and regular rhythm.      Heart sounds: No murmur heard.     No gallop.   Pulmonary:      Effort: No respiratory distress.      Breath sounds: No wheezing.   Abdominal:      General: Abdomen is flat. Bowel sounds are normal. There is no distension.      Palpations: Abdomen is soft. There is no mass.      Tenderness: There is no abdominal tenderness.      Hernia: No hernia is present.   Musculoskeletal:      Right lower leg: No edema.      Left lower leg: No edema.   Neurological:      Mental Status: He is alert.   Psychiatric:         Mood and Affect: Mood normal.         ASSESSMENT:    1. Candida UTI    2. Klebsiella pneumoniae infection    3. Anxiety    4. Prostate cancer (HCC)        PLAN:    Thor was seen today for follow-up.    Diagnoses and all orders for this visit:    Candida UTI- most importantly now off abx, we'll also add course of diflucan  -     fluconazole (DIFLUCAN) 100 MG tablet; Take 1 tablet by mouth daily for 7 days    Klebsiella pneumoniae infection-

## 2024-07-02 ENCOUNTER — HOSPITAL ENCOUNTER (OUTPATIENT)
Dept: INFUSION THERAPY | Age: 84
Discharge: HOME | End: 2024-07-02
Payer: MEDICARE

## 2024-07-02 ENCOUNTER — OFFICE VISIT (OUTPATIENT)
Dept: INTERNAL MEDICINE CLINIC | Age: 84
End: 2024-07-02
Payer: MEDICARE

## 2024-07-02 ENCOUNTER — OFFICE VISIT (OUTPATIENT)
Dept: ONCOLOGY | Age: 84
End: 2024-07-02
Payer: MEDICARE

## 2024-07-02 ENCOUNTER — HOSPITAL ENCOUNTER (OUTPATIENT)
Age: 84
Discharge: HOME | End: 2024-07-02
Payer: MEDICARE

## 2024-07-02 ENCOUNTER — HOSPITAL ENCOUNTER (OUTPATIENT)
Dept: RADIATION ONCOLOGY | Age: 84
Discharge: HOME | End: 2024-07-02

## 2024-07-02 VITALS
WEIGHT: 171 LBS | HEIGHT: 67 IN | TEMPERATURE: 97.9 F | HEART RATE: 89 BPM | BODY MASS INDEX: 26.84 KG/M2 | SYSTOLIC BLOOD PRESSURE: 132 MMHG | OXYGEN SATURATION: 98 % | DIASTOLIC BLOOD PRESSURE: 69 MMHG

## 2024-07-02 VITALS
WEIGHT: 171.6 LBS | OXYGEN SATURATION: 97 % | SYSTOLIC BLOOD PRESSURE: 120 MMHG | HEART RATE: 75 BPM | DIASTOLIC BLOOD PRESSURE: 80 MMHG | BODY MASS INDEX: 26.93 KG/M2 | HEIGHT: 67 IN

## 2024-07-02 DIAGNOSIS — I10 PRIMARY HYPERTENSION: ICD-10-CM

## 2024-07-02 DIAGNOSIS — D72.819 LEUKOPENIA, UNSPECIFIED TYPE: Primary | ICD-10-CM

## 2024-07-02 DIAGNOSIS — E11.9 CONTROLLED TYPE 2 DIABETES MELLITUS WITHOUT COMPLICATION, WITHOUT LONG-TERM CURRENT USE OF INSULIN (HCC): Primary | ICD-10-CM

## 2024-07-02 DIAGNOSIS — D64.9 ANEMIA, UNSPECIFIED TYPE: ICD-10-CM

## 2024-07-02 DIAGNOSIS — R53.82 CHRONIC FATIGUE: ICD-10-CM

## 2024-07-02 DIAGNOSIS — C61 PROSTATE CANCER (HCC): ICD-10-CM

## 2024-07-02 DIAGNOSIS — I51.81 TAKOTSUBO CARDIOMYOPATHY: ICD-10-CM

## 2024-07-02 LAB — PROSTATE SPECIFIC ANTIGEN: 16.78 NG/ML (ref 0–4)

## 2024-07-02 PROCEDURE — 1036F TOBACCO NON-USER: CPT | Performed by: INTERNAL MEDICINE

## 2024-07-02 PROCEDURE — G0103 PSA SCREENING: HCPCS

## 2024-07-02 PROCEDURE — G8427 DOCREV CUR MEDS BY ELIG CLIN: HCPCS | Performed by: INTERNAL MEDICINE

## 2024-07-02 PROCEDURE — 36415 COLL VENOUS BLD VENIPUNCTURE: CPT

## 2024-07-02 PROCEDURE — 3074F SYST BP LT 130 MM HG: CPT | Performed by: INTERNAL MEDICINE

## 2024-07-02 PROCEDURE — 99211 OFF/OP EST MAY X REQ PHY/QHP: CPT

## 2024-07-02 PROCEDURE — G8417 CALC BMI ABV UP PARAM F/U: HCPCS | Performed by: INTERNAL MEDICINE

## 2024-07-02 PROCEDURE — 3079F DIAST BP 80-89 MM HG: CPT | Performed by: INTERNAL MEDICINE

## 2024-07-02 PROCEDURE — 3046F HEMOGLOBIN A1C LEVEL >9.0%: CPT | Performed by: INTERNAL MEDICINE

## 2024-07-02 PROCEDURE — 99214 OFFICE O/P EST MOD 30 MIN: CPT | Performed by: INTERNAL MEDICINE

## 2024-07-02 PROCEDURE — 1123F ACP DISCUSS/DSCN MKR DOCD: CPT | Performed by: INTERNAL MEDICINE

## 2024-07-02 PROCEDURE — 99215 OFFICE O/P EST HI 40 MIN: CPT | Performed by: INTERNAL MEDICINE

## 2024-07-02 PROCEDURE — 3075F SYST BP GE 130 - 139MM HG: CPT | Performed by: INTERNAL MEDICINE

## 2024-07-02 PROCEDURE — 3078F DIAST BP <80 MM HG: CPT | Performed by: INTERNAL MEDICINE

## 2024-07-02 ASSESSMENT — PATIENT HEALTH QUESTIONNAIRE - PHQ9
1. LITTLE INTEREST OR PLEASURE IN DOING THINGS: NOT AT ALL
2. FEELING DOWN, DEPRESSED OR HOPELESS: NOT AT ALL
SUM OF ALL RESPONSES TO PHQ QUESTIONS 1-9: 0
SUM OF ALL RESPONSES TO PHQ9 QUESTIONS 1 & 2: 0

## 2024-07-02 NOTE — PROGRESS NOTES
MA Rooming Questions  Patient: Thor Jha  MRN: [id number]    Date: 7/2/2024        1. Do you have any new issues?   no         2. Do you need any refills on medications?    no    3. Have you had any imaging done since your last visit?   yes - PET scan, MRI and biopsy at [facility]    4. Have you been hospitalized or seen in the emergency room since your last visit here?   no    5. Did the patient have a depression screening completed today? Yes    PHQ-9 Total Score: 0 (7/2/2024  9:42 AM)       PHQ-9 Given to (if applicable):               PHQ-9 Score (if applicable):                     [] Positive     [x]  Negative              Does question #9 need addressed (if applicable)                     [] Yes    []  No               Mary Villalobos CMA

## 2024-07-02 NOTE — PROGRESS NOTES
Thor Domínguezn  1940 07/02/24    SUBJECTIVE:    Last psa most recently 16 in Feb, saw Dr Busch today w Dr Hall and not candidate for RT.  May be candidate for hormonal therapy, pill and shot.  We'll check psa now.    We don't have PSA results fr prior [facility] testing and will call.     [facility] CMP, last echo w EF normalized    DM- last A1c high, we'll check poct now.  Lab Results   Component Value Date    LABA1C 9.4 (H) 06/11/2024    LABA1C 8.3 02/26/2024    LABA1C 8.8 11/01/2023     Lab Results   Component Value Date    MALBCR 247.9 (H) 06/11/2024    CREATININE 1.3 06/26/2024       OBJECTIVE:    /80 (Site: Left Upper Arm, Position: Sitting, Cuff Size: Medium Adult)   Pulse 75   Ht 1.702 m (5' 7.01\")   Wt 77.8 kg (171 lb 9.6 oz)   SpO2 97%   BMI 26.87 kg/m²     Physical Exam  Vitals reviewed.   Constitutional:       General: He is not in acute distress.     Appearance: He is well-developed.   HENT:      Head: Normocephalic and atraumatic.      Mouth/Throat:      Mouth: Mucous membranes are moist.      Pharynx: Oropharynx is clear. No oropharyngeal exudate or posterior oropharyngeal erythema.   Eyes:      General: No scleral icterus.        Right eye: No discharge.         Left eye: No discharge.      Conjunctiva/sclera: Conjunctivae normal.      Pupils: Pupils are equal, round, and reactive to light.   Neck:      Thyroid: No thyromegaly.      Vascular: No JVD.      Trachea: No tracheal deviation.   Cardiovascular:      Rate and Rhythm: Normal rate and regular rhythm.      Heart sounds: Normal heart sounds. No murmur heard.     No friction rub. No gallop.   Pulmonary:      Effort: Pulmonary effort is normal. No respiratory distress.      Breath sounds: Normal breath sounds. No wheezing or rales.   Abdominal:      General: Bowel sounds are normal. There is no distension.      Palpations: Abdomen is soft. There is no mass.      Tenderness: There is no abdominal tenderness. There is no

## 2024-07-03 NOTE — CONSULTS
spells  GI:  Patient denies nausea, vomiting, diarrhea, melena, or hematochezia  : Patient denies dysuria, hematuria, or urinary incontinence.  Integumentary: patient denies skin rashes, pruritis, or arm edema  Endocrine:  Patient denies any diabetic or thyroid problems  Neurologic: Patient denies headaches, focal weakness, or disorientation  Psychiatric: The patient denies any depression, anxiety, or rapid mood swings.    PHYSICAL EXAMINATION:   VITAL SIGNS: reviewed and noted in the EMR  ECOG PERFORMANCE STATUS: 1  PAIN RATIN    GENERAL: Pleasant well-developed adult in no acute distress.  Alert and oriented ×3  SKIN: Warm and dry, without jaundice, ecchymoses, or petechiae.  HEENT: Normocephalic, atraumatic.  Pupils are round and symmetric. Sclerae anicteric  NECK:  No JVD; Supple. No cervical, supraclavicular, or infraclavicular lymphadenopathy is palpable.  LUNGS: Bilaterally clear to auscultation.  No rales, rhonci, or wheezing are present. Good inspiratory effort, no accessory muscle use.   CARDIAC: Regular rate and rhythm, without murmurs, clicks, or gallops   ABDOMEN: Normoactive bowels sounds are present.  Soft. Non-tender and non-distended.  No hepatosplenomegaly or masses are present.  EXTREMITIES: No cyanosis, clubbing or edema is present.  FROM  NEUROLOGIC: Gait unremarkable. The patient is alert and oriented.  CN II-XII are grossly intact.  Sensation to light touch is intact and symmetric in the fingers and feet.  Muscle strength is 5/5 in both the upper and lower extremities.    IMPRESSION/PLAN:    Thor Jha is a 83 y.o. male with unfavorable IR prostate cancer mitchel 4+3, [id number], PSA 16 who presents for discussion of radiotherapy treatment options.     I told him we typically treat prostate cancer with surgery or radiation therapy.  I am not sure is a great surgical candidate because of his age, prior surgery in the pelvis, and prior radiation.  In terms of radiation I would not

## 2024-07-03 NOTE — RESULT ENCOUNTER NOTE
Please call pt, his PSA is stable at 16.78, prior 16.06  will also forward to Dr Jauregui and Dr Hall

## 2024-07-09 ENCOUNTER — TELEPHONE (OUTPATIENT)
Dept: INTERNAL MEDICINE CLINIC | Age: 84
End: 2024-07-09

## 2024-07-09 NOTE — TELEPHONE ENCOUNTER
Called Urology back. They do not have any PSA other than the one we sent. I called Ed. He said he has not seen anyone other than Dr. Cardona.

## 2024-07-09 NOTE — TELEPHONE ENCOUNTER
Called urology I requested last PSA they did. She said last one they had was from Feb. They sent over the one that we did in Feb. Were you looking for something current?   I had a note we were waiting on PSA from Urology on my desk.

## 2024-07-28 NOTE — PROGRESS NOTES
Patient Name:  Thor Jha  Patient :  1940  Patient MRN:  [id number]     Primary Oncologist: Arlene Hall MD  Referring Provider: Sterling Kaye MD     Date of Service: 2024      Chief Complaint:    Chief Complaint   Patient presents with    Follow-up      FU visit.    Patient Active Problem List:     Hyperlipidemia     Adenocarcinoma of colon     Atherosclerosis of aorta      Anxiety     Onychomycosis     Controlled type 2 diabetes mellitus without complication, without long-term current use of insulin      Age-related macular degeneration, wet, right eye      Takotsubo cardiomyopathy     Hypertension     Other neutropenia (HCC)     HPI:   Thor Jha is a pleasant 83 year old male patient who was referred for evaluation of leukopenia.   2022 creatinine 1.4. LFTs wnl.  2022 WBC: 2.8 (L) RBC: 4.75 Hemoglobin: 15.6 Hematocrit: 46.0 MCV: 96.7 Platelet Count: 149  Basophils %: 0.5 Neutrophils Absolute: 1.1 (L) Lymphocytes Absolute: 1.2 Monocytes Absolute: 0.3 Eosinophils Absolute: 0.2 Basophils Absolute: 0.0    WBC has been fluctuating between 2.8 and 4.7.  He denied any frequent infection.  I reviewed his medication list.  We discussed about potential bone marrow study to rule out myelodysplasia or other bone marrow diseases.  May 10, 2022 WBC 2.98, hemoglobin 15.7, platelet 160.  Absolute neutrophil 1.53.    He had history of rectal cancer in  status post chemo and radiation therapy.  Colonoscopy was in 2021.  Follow-up in 3 years.  2022 WBC 2.7, Hg 15.2, plt 169. Retic 1.4. ANC 1.3.    6/15/2022 Bone marrow, biopsy, clot section, and aspirate smears:   -     Hemodilute specimen, see comment.   Peripheral blood smear:   -     Leukopenia.     Comment   The core biopsy shows bone trabeculae with fat. No cellular bone marrow tissue is seen. Peripheral blood counts are not low enough to suggest an essentially acellular marrow, therefore, the biopsy may have been

## 2024-07-30 DIAGNOSIS — E11.22 TYPE 2 DIABETES MELLITUS WITH STAGE 3 CHRONIC KIDNEY DISEASE, WITHOUT LONG-TERM CURRENT USE OF INSULIN, UNSPECIFIED WHETHER STAGE 3A OR 3B CKD (HCC): ICD-10-CM

## 2024-07-30 DIAGNOSIS — N18.30 TYPE 2 DIABETES MELLITUS WITH STAGE 3 CHRONIC KIDNEY DISEASE, WITHOUT LONG-TERM CURRENT USE OF INSULIN, UNSPECIFIED WHETHER STAGE 3A OR 3B CKD (HCC): ICD-10-CM

## 2024-07-30 RX ORDER — EMPAGLIFLOZIN 25 MG/1
25 TABLET, FILM COATED ORAL EVERY MORNING
Qty: 90 TABLET | Refills: 1 | Status: SHIPPED | OUTPATIENT
Start: 2024-07-30

## 2024-08-02 DIAGNOSIS — B35.1 ONYCHOMYCOSIS: ICD-10-CM

## 2024-08-02 RX ORDER — KETOCONAZOLE 20 MG/G
CREAM TOPICAL
Qty: 30 G | Refills: 1 | Status: SHIPPED | OUTPATIENT
Start: 2024-08-02

## 2024-08-03 DIAGNOSIS — E78.2 MIXED HYPERLIPIDEMIA: ICD-10-CM

## 2024-08-04 DIAGNOSIS — E11.9 CONTROLLED TYPE 2 DIABETES MELLITUS WITHOUT COMPLICATION, WITHOUT LONG-TERM CURRENT USE OF INSULIN (HCC): ICD-10-CM

## 2024-08-05 RX ORDER — ROSUVASTATIN CALCIUM 10 MG/1
10 TABLET, COATED ORAL DAILY
Qty: 90 TABLET | Refills: 1 | OUTPATIENT
Start: 2024-08-05

## 2024-08-09 DIAGNOSIS — E78.2 MIXED HYPERLIPIDEMIA: ICD-10-CM

## 2024-08-09 NOTE — TELEPHONE ENCOUNTER
Pt. Requested refill. He stated he tries to take about 3 times per week. This is why he has not needed an RX in a few years.   It upsets his stomach.

## 2024-08-10 RX ORDER — ROSUVASTATIN CALCIUM 10 MG/1
10 TABLET, COATED ORAL DAILY
Qty: 90 TABLET | Refills: 1 | Status: SHIPPED | OUTPATIENT
Start: 2024-08-10

## 2024-08-12 ENCOUNTER — TELEPHONE (OUTPATIENT)
Dept: INTERNAL MEDICINE CLINIC | Age: 84
End: 2024-08-12

## 2024-08-12 DIAGNOSIS — E11.9 CONTROLLED TYPE 2 DIABETES MELLITUS WITHOUT COMPLICATION, WITHOUT LONG-TERM CURRENT USE OF INSULIN (HCC): ICD-10-CM

## 2024-08-12 DIAGNOSIS — I10 ESSENTIAL HYPERTENSION: ICD-10-CM

## 2024-08-12 RX ORDER — HYDROCHLOROTHIAZIDE 25 MG/1
12.5 TABLET ORAL DAILY
Qty: 45 TABLET | Refills: 1 | Status: SHIPPED | OUTPATIENT
Start: 2024-08-12

## 2024-08-12 RX ORDER — LISINOPRIL 40 MG/1
TABLET ORAL
Qty: 90 TABLET | Refills: 1 | Status: SHIPPED | OUTPATIENT
Start: 2024-08-12

## 2024-08-12 NOTE — TELEPHONE ENCOUNTER
Lisinopril and HCTZ recently stopped pt. Stated per Dr. Kaye. He is concerned as his morning BP has been running around 140/70 before morning meds, amlodipine. He feels this is to high. Please advise.

## 2024-08-15 ENCOUNTER — HOSPITAL ENCOUNTER (OUTPATIENT)
Dept: INFUSION THERAPY | Age: 84
Discharge: HOME | End: 2024-08-15
Payer: MEDICARE

## 2024-08-15 DIAGNOSIS — D64.9 ANEMIA, UNSPECIFIED TYPE: ICD-10-CM

## 2024-08-15 DIAGNOSIS — R53.82 CHRONIC FATIGUE: ICD-10-CM

## 2024-08-15 DIAGNOSIS — D72.819 LEUKOPENIA, UNSPECIFIED TYPE: ICD-10-CM

## 2024-08-15 LAB
BASOPHILS ABSOLUTE: 0 K/CU MM
BASOPHILS RELATIVE PERCENT: 0.3 % (ref 0–1)
DIFFERENTIAL TYPE: ABNORMAL
EOSINOPHILS ABSOLUTE: 0.2 K/CU MM
EOSINOPHILS RELATIVE PERCENT: 5.5 % (ref 0–3)
FERRITIN: 119 NG/ML (ref 30–400)
FOLATE SERPL-MCNC: 20 NG/ML (ref 3.1–17.5)
HCT VFR BLD CALC: 43.4 % (ref 42–52)
HEMOGLOBIN: 14.6 GM/DL (ref 13.5–18)
IRON: 122 UG/DL (ref 59–158)
LYMPHOCYTES ABSOLUTE: 1.5 K/CU MM
LYMPHOCYTES RELATIVE PERCENT: 47.6 % (ref 24–44)
MCH RBC QN AUTO: 32 PG (ref 27–31)
MCHC RBC AUTO-ENTMCNC: 33.6 % (ref 32–36)
MCV RBC AUTO: 95.2 FL (ref 78–100)
MONOCYTES ABSOLUTE: 0.2 K/CU MM
MONOCYTES RELATIVE PERCENT: 7.8 % (ref 0–4)
NEUTROPHILS ABSOLUTE: 1.2 K/CU MM
NEUTROPHILS RELATIVE PERCENT: 38.8 % (ref 36–66)
PCT TRANSFERRIN: 44 % (ref 10–44)
PDW BLD-RTO: 13.4 % (ref 11.7–14.9)
PLATELET # BLD: 177 K/CU MM (ref 140–440)
PMV BLD AUTO: 10.6 FL (ref 7.5–11.1)
RBC # BLD: 4.56 M/CU MM (ref 4.6–6.2)
TOTAL IRON BINDING CAPACITY: 280 UG/DL (ref 250–450)
TSH SERPL DL<=0.005 MIU/L-ACNC: 3.69 UIU/ML (ref 0.27–4.2)
UNSATURATED IRON BINDING CAPACITY: 158 UG/DL (ref 110–370)
VITAMIN B-12: 296.2 PG/ML (ref 211–911)
WBC # BLD: 3.1 K/CU MM (ref 4–10.5)

## 2024-08-15 PROCEDURE — 83550 IRON BINDING TEST: CPT

## 2024-08-15 PROCEDURE — 82728 ASSAY OF FERRITIN: CPT

## 2024-08-15 PROCEDURE — 86430 RHEUMATOID FACTOR TEST QUAL: CPT

## 2024-08-15 PROCEDURE — 85025 COMPLETE CBC W/AUTO DIFF WBC: CPT

## 2024-08-15 PROCEDURE — 86038 ANTINUCLEAR ANTIBODIES: CPT

## 2024-08-15 PROCEDURE — 84443 ASSAY THYROID STIM HORMONE: CPT

## 2024-08-15 PROCEDURE — 83540 ASSAY OF IRON: CPT

## 2024-08-15 PROCEDURE — 36415 COLL VENOUS BLD VENIPUNCTURE: CPT

## 2024-08-15 PROCEDURE — 82746 ASSAY OF FOLIC ACID SERUM: CPT

## 2024-08-15 PROCEDURE — 86039 ANTINUCLEAR ANTIBODIES (ANA): CPT

## 2024-08-15 PROCEDURE — 82607 VITAMIN B-12: CPT

## 2024-08-17 LAB
NUCLEAR IGG SER QL IA: DETECTED
RHEUMATOID FACTOR: <10 IU/ML (ref 0–14)

## 2024-08-18 LAB
ANTINUCLEAR ANTIBODY, HEP-2, IGG: NORMAL
INTERPRETATION: NORMAL

## 2024-08-22 ENCOUNTER — OFFICE VISIT (OUTPATIENT)
Dept: ONCOLOGY | Age: 84
End: 2024-08-22
Payer: MEDICARE

## 2024-08-22 ENCOUNTER — HOSPITAL ENCOUNTER (OUTPATIENT)
Dept: INFUSION THERAPY | Age: 84
Discharge: HOME | End: 2024-08-22
Payer: MEDICARE

## 2024-08-22 VITALS
RESPIRATION RATE: 14 BRPM | OXYGEN SATURATION: 97 % | SYSTOLIC BLOOD PRESSURE: 176 MMHG | HEIGHT: 67 IN | DIASTOLIC BLOOD PRESSURE: 93 MMHG | HEART RATE: 80 BPM | BODY MASS INDEX: 25.36 KG/M2 | WEIGHT: 161.6 LBS | TEMPERATURE: 97.5 F

## 2024-08-22 DIAGNOSIS — R76.8 ANA POSITIVE: ICD-10-CM

## 2024-08-22 DIAGNOSIS — D72.819 LEUKOPENIA, UNSPECIFIED TYPE: Primary | ICD-10-CM

## 2024-08-22 PROCEDURE — G8417 CALC BMI ABV UP PARAM F/U: HCPCS | Performed by: INTERNAL MEDICINE

## 2024-08-22 PROCEDURE — 3077F SYST BP >= 140 MM HG: CPT | Performed by: INTERNAL MEDICINE

## 2024-08-22 PROCEDURE — 99213 OFFICE O/P EST LOW 20 MIN: CPT | Performed by: INTERNAL MEDICINE

## 2024-08-22 PROCEDURE — 1036F TOBACCO NON-USER: CPT | Performed by: INTERNAL MEDICINE

## 2024-08-22 PROCEDURE — 1123F ACP DISCUSS/DSCN MKR DOCD: CPT | Performed by: INTERNAL MEDICINE

## 2024-08-22 PROCEDURE — 3080F DIAST BP >= 90 MM HG: CPT | Performed by: INTERNAL MEDICINE

## 2024-08-22 PROCEDURE — G8427 DOCREV CUR MEDS BY ELIG CLIN: HCPCS | Performed by: INTERNAL MEDICINE

## 2024-08-22 PROCEDURE — 99211 OFF/OP EST MAY X REQ PHY/QHP: CPT

## 2024-08-22 RX ORDER — TAMSULOSIN HYDROCHLORIDE 0.4 MG/1
0.4 CAPSULE ORAL DAILY
COMMUNITY

## 2024-08-22 NOTE — PROGRESS NOTES
MA Rooming Questions  Patient: Thor Jha  MRN: [id number]    Date: 8/22/2024        1. Do you have any new issues?   no         2. Do you need any refills on medications?    no    3. Have you had any imaging done since your last visit?   no    4. Have you been hospitalized or seen in the emergency room since your last visit here?   no    5. Did the patient have a depression screening completed today? No    No data recorded     PHQ-9 Given to (if applicable):               PHQ-9 Score (if applicable):                     [] Positive     []  Negative              Does question #9 need addressed (if applicable)                     [] Yes    []  No               Ligia Snyder MA

## 2024-08-28 DIAGNOSIS — I10 ESSENTIAL HYPERTENSION: ICD-10-CM

## 2024-08-28 RX ORDER — AMLODIPINE BESYLATE 5 MG/1
5 TABLET ORAL DAILY
Qty: 30 TABLET | Refills: 0 | Status: SHIPPED | OUTPATIENT
Start: 2024-08-28

## 2024-09-02 NOTE — PROGRESS NOTES
Thor FLORES Abhijeet  1940 09/02/24    SUBJECTIVE:   Hx rectal ca, last 2021 and due for recheck now.  He req alt GI, we'll refer Dr Burgess.    Prostate ca, on testosterone hormone blocker w Dr Bolanos and no RT was indicated, also seeing Dr Jacobs    DM- states sugars stable, due for f/u lab.  Lab Results   Component Value Date    LABA1C 9.4 (H) 06/11/2024    LABA1C 8.3 02/26/2024    LABA1C 8.8 11/01/2023     Lab Results   Component Value Date    MALBCR 247.9 (H) 06/11/2024    CREATININE 1.3 06/26/2024     Cardiomyopathy, stable also seeing Dr Pepper    Hypertension: Stable. Denies CP, SOB, cough, visual changes, dizziness, palpitations or HA.      Chr anxiety, stable too on ativan, Controlled substances monitoring: possible medication side effects, risk of tolerance and/or dependence, and alternative treatments discussed, no signs of potential drug abuse or diversion identified and OARRS report reviewed today- activity consistent with treatment plan.    OBJECTIVE:    BP (!) 140/80 (Site: Left Upper Arm, Position: Sitting, Cuff Size: Medium Adult)   Pulse 68   Ht 1.702 m (5' 7.01\")   Wt 75.9 kg (167 lb 6.4 oz)   SpO2 96%   BMI 26.21 kg/m²     Physical Exam  Vitals reviewed.   Constitutional:       General: He is not in acute distress.     Appearance: He is well-developed.   HENT:      Head: Normocephalic and atraumatic.      Mouth/Throat:      Mouth: Mucous membranes are moist.      Pharynx: Oropharynx is clear. No oropharyngeal exudate or posterior oropharyngeal erythema.   Eyes:      General: No scleral icterus.        Right eye: No discharge.         Left eye: No discharge.      Conjunctiva/sclera: Conjunctivae normal.      Pupils: Pupils are equal, round, and reactive to light.   Neck:      Thyroid: No thyromegaly.      Vascular: No JVD.      Trachea: No tracheal deviation.   Cardiovascular:      Rate and Rhythm: Normal rate and regular rhythm.      Heart sounds: Normal heart sounds. No murmur heard.

## 2024-09-03 ENCOUNTER — OFFICE VISIT (OUTPATIENT)
Dept: INTERNAL MEDICINE CLINIC | Age: 84
End: 2024-09-03
Payer: MEDICARE

## 2024-09-03 VITALS
DIASTOLIC BLOOD PRESSURE: 80 MMHG | HEART RATE: 68 BPM | OXYGEN SATURATION: 96 % | BODY MASS INDEX: 26.27 KG/M2 | SYSTOLIC BLOOD PRESSURE: 140 MMHG | WEIGHT: 167.4 LBS | HEIGHT: 67 IN

## 2024-09-03 DIAGNOSIS — I10 ESSENTIAL HYPERTENSION: ICD-10-CM

## 2024-09-03 DIAGNOSIS — C61 PROSTATE CANCER (HCC): ICD-10-CM

## 2024-09-03 DIAGNOSIS — I51.81 TAKOTSUBO CARDIOMYOPATHY: ICD-10-CM

## 2024-09-03 DIAGNOSIS — E11.9 CONTROLLED TYPE 2 DIABETES MELLITUS WITHOUT COMPLICATION, WITHOUT LONG-TERM CURRENT USE OF INSULIN (HCC): Primary | ICD-10-CM

## 2024-09-03 DIAGNOSIS — C18.9 ADENOCARCINOMA OF COLON (HCC): ICD-10-CM

## 2024-09-03 DIAGNOSIS — F41.9 ANXIETY: ICD-10-CM

## 2024-09-03 PROCEDURE — G2211 COMPLEX E/M VISIT ADD ON: HCPCS | Performed by: INTERNAL MEDICINE

## 2024-09-03 PROCEDURE — 1123F ACP DISCUSS/DSCN MKR DOCD: CPT | Performed by: INTERNAL MEDICINE

## 2024-09-03 PROCEDURE — 3079F DIAST BP 80-89 MM HG: CPT | Performed by: INTERNAL MEDICINE

## 2024-09-03 PROCEDURE — 99214 OFFICE O/P EST MOD 30 MIN: CPT | Performed by: INTERNAL MEDICINE

## 2024-09-03 PROCEDURE — 1036F TOBACCO NON-USER: CPT | Performed by: INTERNAL MEDICINE

## 2024-09-03 PROCEDURE — G8417 CALC BMI ABV UP PARAM F/U: HCPCS | Performed by: INTERNAL MEDICINE

## 2024-09-03 PROCEDURE — 3077F SYST BP >= 140 MM HG: CPT | Performed by: INTERNAL MEDICINE

## 2024-09-03 PROCEDURE — G8427 DOCREV CUR MEDS BY ELIG CLIN: HCPCS | Performed by: INTERNAL MEDICINE

## 2024-09-03 PROCEDURE — 3046F HEMOGLOBIN A1C LEVEL >9.0%: CPT | Performed by: INTERNAL MEDICINE

## 2024-09-03 RX ORDER — RELUGOLIX 120 MG/1
TABLET, FILM COATED ORAL
COMMUNITY

## 2024-09-03 RX ORDER — LORAZEPAM 0.5 MG/1
0.5 TABLET ORAL 2 TIMES DAILY PRN
Qty: 30 TABLET | Refills: 0 | Status: SHIPPED | OUTPATIENT
Start: 2024-09-03 | End: 2024-10-03

## 2024-09-20 DIAGNOSIS — I10 ESSENTIAL HYPERTENSION: ICD-10-CM

## 2024-09-20 RX ORDER — AMLODIPINE BESYLATE 5 MG/1
5 TABLET ORAL DAILY
Qty: 90 TABLET | Refills: 1 | Status: SHIPPED | OUTPATIENT
Start: 2024-09-20

## 2024-10-01 LAB
ALT SERPL-CCNC: 18 U/L (ref 0–60)
ANA BY IFA: <1
AST SERPL-CCNC: 17 U/L (ref 0–55)
BILIRUBIN, URINE: NEGATIVE MG/DL
C3 COMPLEMENT: 137 MG/DL (ref 90–180)
C4 COMPLEMENT: 23 MG/DL (ref 10–40)
CLARITY, UA: CLEAR
COLOR, UA: YELLOW
CYCLIC CITRULLINATED PEPTIDE ANTIBODY IGG: <20 EIA
GLUCOSE URINE: >1000 MG/DL
HCT VFR BLD CALC: 46.1 % (ref 37.5–51)
HEMOGLOBIN: 15 G/DL (ref 13–17.7)
KETONES, URINE: NEGATIVE MG/DL
LEUKOCYTE ESTERASE, URINE: ABNORMAL
MCH RBC QN AUTO: 31.1 PG (ref 26–34)
MCHC RBC AUTO-ENTMCNC: 32.5 G/DL (ref 30.7–35.5)
MCV RBC AUTO: 95.6 FL (ref 80–100)
MYELOPEROXIDASE AB: <=20 EIA
NITRITE, URINE: POSITIVE
PDW BLD-RTO: 13.4 %
PH, URINE: 6 (ref 4.5–8)
PLATELET # BLD: 189 K/UL (ref 140–400)
PMV BLD AUTO: 12.3 FL (ref 7.2–11.7)
PR3 IGG: <=20 EIA
PROTEIN, URINE: 10 MG/DL
RBC # BLD: 4.82 M/UL (ref 4.14–5.8)
SED RATE, AUTOMATED: 9 MM/HR (ref 0–20)
SPECIFIC GRAVITY UA: 1.03 (ref 1–1.03)
TOTAL CK: 228 U/L (ref 0–250)
TSH ULTRASENSITIVE: 3.55 MCIU/ML (ref 0.4–4.5)
URINE HGB: NEGATIVE MG/DL
UROBILINOGEN, URINE: <2 MG/DL (ref 0–2)
WBC # BLD: 3.1 K/UL (ref 3.5–10.9)

## 2024-10-02 LAB
DSDNA ANTIBODY: <201 EIA
ENA TO SCL-70 ANTIBODY: <20 EIA
ENA TO SSA (RO) ANTIBODY: <20 EIA
ENA TO SSB (LA) ANTIBODY: <20 EIA
JO-1 ANTIBODY: <20 EIA
SM AB: <20 EIA
SMITH + RIBONUCLEOPROTEIN EXTRACTABLE NUCLEAR AB: <20 EIA

## 2024-10-03 LAB — ANTI-PM/SCL-100 AB (RDL): NEGATIVE

## 2024-10-22 DIAGNOSIS — E11.9 CONTROLLED TYPE 2 DIABETES MELLITUS WITHOUT COMPLICATION, WITHOUT LONG-TERM CURRENT USE OF INSULIN (HCC): ICD-10-CM

## 2024-10-23 RX ORDER — BLOOD SUGAR DIAGNOSTIC
1 STRIP MISCELLANEOUS 3 TIMES DAILY
Qty: 100 STRIP | Refills: 5 | Status: SHIPPED | OUTPATIENT
Start: 2024-10-23

## 2024-10-26 DIAGNOSIS — E11.22 TYPE 2 DIABETES MELLITUS WITH STAGE 3 CHRONIC KIDNEY DISEASE, WITHOUT LONG-TERM CURRENT USE OF INSULIN, UNSPECIFIED WHETHER STAGE 3A OR 3B CKD (HCC): ICD-10-CM

## 2024-10-26 DIAGNOSIS — N18.30 TYPE 2 DIABETES MELLITUS WITH STAGE 3 CHRONIC KIDNEY DISEASE, WITHOUT LONG-TERM CURRENT USE OF INSULIN, UNSPECIFIED WHETHER STAGE 3A OR 3B CKD (HCC): ICD-10-CM

## 2024-10-28 RX ORDER — GLIPIZIDE 10 MG/1
TABLET, FILM COATED, EXTENDED RELEASE ORAL
Qty: 180 TABLET | Refills: 1 | OUTPATIENT
Start: 2024-10-28

## 2024-10-31 ENCOUNTER — TELEPHONE (OUTPATIENT)
Dept: INTERNAL MEDICINE CLINIC | Age: 84
End: 2024-10-31

## 2024-10-31 LAB
A/G RATIO: 2.3 RATIO (ref 0.8–2.6)
ALBUMIN: 4.5 G/DL (ref 3.5–5.2)
ALP BLD-CCNC: 83 U/L (ref 23–144)
ALT SERPL-CCNC: 16 U/L (ref 0–60)
AST SERPL-CCNC: 20 U/L (ref 0–55)
BASOPHILS ABSOLUTE: 0.1 K/UL (ref 0–0.3)
BASOPHILS RELATIVE PERCENT: 1.6 % (ref 0–2)
BILIRUB SERPL-MCNC: 0.5 MG/DL (ref 0–1.2)
BUN / CREAT RATIO: 24 (ref 7–25)
BUN BLDV-MCNC: 34 MG/DL (ref 3–29)
CALCIUM SERPL-MCNC: 9.4 MG/DL (ref 8.5–10.5)
CHLORIDE BLD-SCNC: 101 MEQ/L (ref 96–110)
CHOLESTEROL, TOTAL: 184 MG/DL
CO2: 25 MEQ/L (ref 19–32)
CREAT SERPL-MCNC: 1.4 MG/DL (ref 0.5–1.2)
DIFFERENTIAL COUNT: ABNORMAL
EOSINOPHILS ABSOLUTE: 0.2 K/UL (ref 0–0.5)
EOSINOPHILS RELATIVE PERCENT: 5.6 % (ref 0–5)
ESTIMATED GLOMERULAR FILTRATION RATE CREATININE EQUATION: 50 MLS/MIN/1.73M2
FASTING STATUS: ABNORMAL
GLOBULIN: 2 G/DL (ref 1.9–3.6)
GLUCOSE BLD-MCNC: 190 MG/DL (ref 70–99)
HBA1C MFR BLD: 10.3 %
HCT VFR BLD CALC: 44.8 % (ref 37.5–51)
HDLC SERPL-MCNC: 68 MG/DL
HEMOGLOBIN: 14.4 G/DL (ref 13–17.7)
IMMATURE GRANS (ABS): 0 K/UL (ref 0–0.1)
IMMATURE GRANULOCYTES %: 0 %
LDL CHOLESTEROL: 100 MG/DL
LYMPHOCYTES ABSOLUTE: 1.5 K/UL (ref 0.9–4.1)
LYMPHOCYTES RELATIVE PERCENT: 45.8 % (ref 14–51)
MCH RBC QN AUTO: 31 PG (ref 26–34)
MCHC RBC AUTO-ENTMCNC: 32.1 G/DL (ref 30.7–35.5)
MCV RBC AUTO: 96.6 FL (ref 80–100)
MONOCYTES ABSOLUTE: 0.3 K/UL (ref 0.2–1)
MONOCYTES RELATIVE PERCENT: 7.8 % (ref 4–12)
NEUTROPHILS ABSOLUTE: 1.3 K/UL (ref 1.8–7.5)
NEUTROPHILS RELATIVE PERCENT: 39.2 % (ref 42–80)
PDW BLD-RTO: 13.8 %
PLATELET # BLD: 183 K/UL (ref 140–400)
PMV BLD AUTO: 12.1 FL (ref 7.2–11.7)
POTASSIUM SERPL-SCNC: 4.3 MEQ/L (ref 3.4–5.3)
RBC # BLD: 4.64 M/UL (ref 4.14–5.8)
RETICULOCYTE ABSOLUTE COUNT: 0 /100 WBC
SODIUM BLD-SCNC: 140 MEQ/L (ref 135–148)
TOTAL PROTEIN: 6.5 G/DL (ref 6–8.3)
TRIGL SERPL-MCNC: 81 MG/DL
VLDLC SERPL CALC-MCNC: 16 MG/DL (ref 4–38)
WBC # BLD: 3.2 K/UL (ref 3.5–10.9)

## 2024-10-31 NOTE — TELEPHONE ENCOUNTER
Patient called to verify it was ok to get labs for Dr. Kaye as he had some labs drawn last week. I let him know it was ok to get labs. I advised to eat something right after lab draw.

## 2024-11-11 ENCOUNTER — HOSPITAL ENCOUNTER (OUTPATIENT)
Age: 84
Discharge: HOME | End: 2024-11-11
Payer: MEDICARE

## 2024-11-11 DIAGNOSIS — R76.8 ANA POSITIVE: ICD-10-CM

## 2024-11-11 DIAGNOSIS — D72.819 LEUKOPENIA, UNSPECIFIED TYPE: ICD-10-CM

## 2024-11-11 LAB
ALBUMIN SERPL-MCNC: 4.4 G/DL (ref 3.4–5)
ALBUMIN/GLOB SERPL: 2.1 {RATIO} (ref 1.1–2.2)
ALP SERPL-CCNC: 84 U/L (ref 40–129)
ALT SERPL-CCNC: 19 U/L (ref 10–40)
ANION GAP SERPL CALCULATED.3IONS-SCNC: 14 MMOL/L (ref 9–17)
AST SERPL-CCNC: 23 U/L (ref 15–37)
BASOPHILS # BLD: 0.03 K/UL
BASOPHILS NFR BLD: 1 % (ref 0–1)
BILIRUB SERPL-MCNC: 0.4 MG/DL (ref 0–1)
BUN SERPL-MCNC: 34 MG/DL (ref 7–20)
CALCIUM SERPL-MCNC: 9.9 MG/DL (ref 8.3–10.6)
CHLORIDE SERPL-SCNC: 101 MMOL/L (ref 99–110)
CO2 SERPL-SCNC: 25 MMOL/L (ref 21–32)
CREAT SERPL-MCNC: 1.3 MG/DL (ref 0.8–1.3)
EOSINOPHIL # BLD: 0.1 K/UL
EOSINOPHILS RELATIVE PERCENT: 3 % (ref 0–3)
ERYTHROCYTE [DISTWIDTH] IN BLOOD BY AUTOMATED COUNT: 13.6 % (ref 11.7–14.9)
GFR, ESTIMATED: 55 ML/MIN/1.73M2
GLUCOSE SERPL-MCNC: 233 MG/DL (ref 74–99)
HCT VFR BLD AUTO: 42.3 % (ref 42–52)
HGB BLD-MCNC: 14 G/DL (ref 13.5–18)
IMM GRANULOCYTES # BLD AUTO: 0 K/UL
IMM GRANULOCYTES NFR BLD: 0 %
LYMPHOCYTES NFR BLD: 1.27 K/UL
LYMPHOCYTES RELATIVE PERCENT: 42 % (ref 24–44)
MCH RBC QN AUTO: 31.3 PG (ref 27–31)
MCHC RBC AUTO-ENTMCNC: 33.1 G/DL (ref 32–36)
MCV RBC AUTO: 94.6 FL (ref 78–100)
MONOCYTES NFR BLD: 0.21 K/UL
MONOCYTES NFR BLD: 7 % (ref 0–4)
NEUTROPHILS NFR BLD: 47 % (ref 36–66)
NEUTS SEG NFR BLD: 1.44 K/UL
PLATELET # BLD AUTO: 186 K/UL (ref 140–440)
PMV BLD AUTO: 11.3 FL (ref 7.5–11.1)
POTASSIUM SERPL-SCNC: 4.6 MMOL/L (ref 3.5–5.1)
PROT SERPL-MCNC: 6.4 G/DL (ref 6.4–8.2)
RBC # BLD AUTO: 4.47 M/UL (ref 4.6–6.2)
SODIUM SERPL-SCNC: 140 MMOL/L (ref 136–145)
WBC OTHER # BLD: 3.1 K/UL (ref 4–10.5)

## 2024-11-11 PROCEDURE — 36415 COLL VENOUS BLD VENIPUNCTURE: CPT

## 2024-11-11 PROCEDURE — 80053 COMPREHEN METABOLIC PANEL: CPT

## 2024-11-11 PROCEDURE — 85025 COMPLETE CBC W/AUTO DIFF WBC: CPT

## 2024-11-13 NOTE — PROGRESS NOTES
infection.  I reviewed the bone marrow report with him and family.  Bone marrow was hemodiluted.  Grossly there is no evidence of acute leukemia or MDS.    1/2023 creat 1.4, LFTs wnl. WBC 3.4, Hg 14.5, MCV 97.6, plat 159. ANC 2.1.  7/27/23 CBC with WBC 3.2, ANC 1.7, Hgb 15.5, Hct 46.2, Platelets 172.   6/2024 CMP grossly unremarkable. CRP 16.7, WBC 5, Hg 12.4, Plat 270.   8/15/2024 folate 20, B-12 296.2. WBC 3.1, Hg 14.6, MCV 95.2, plat 177.  ANC 1.2.   Ferritin 119, Iron 122, TIBC 280. Sat 44.  MEHUL detected. RF <10. He is agreeable to see rheumatologist.  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  Was seen by Dr Jessica Buck for + MEHUL. Reports no SLE.     11/11/2024 creat 1.3. LFTs wnl. WBC 3.1, Hg 14, MCV 94.6, plat 186. MPV 11.3H. Monitor CBC.     2.  He has history of rectal cancer.  He received chemo and RT in 2005.  He has colonoscopy in August 2021.  Follow-up in 3 years was recommended by GI.   I remind him about FU colonoscopy due in 2024. He would like to switch GI to Dr Andrews. No symptoms suggestive of metastatic colon cancer.    3. He follows with Dr Pepper. He has additional heartbeat on auscultation. Confirms follow up regularly with Dr. Pepper. No symptoms.     4. Referred back for prostate cancer.  2/2024 PSA 16.06.  5/6/2024 path report s/p needle biopsy.  Prostatic adeno ca, GS 4+3 =7. Grade group 2.  Percentage of total prostatic tissue involved by tumor = 42%.  Perineural invasion present.  Clinically he has intermediate risk.  6/27/2024 PET: Intense focal uptake in the leftward aspect of the prostate and additional focus of uptake in the right aspect of the prostate compatible with biopsy-proven malignancy.   No evidence of metastatic disease.  FU with Dr Jauregui with surveillance.  I discussed with him about potential therapies including surveillance vs surgery, RP +/-LN dissection.  D/t previous RT related to rectal cancer, he may not be candidate.  He has appointment with Dr Jauregui.  I

## 2024-11-14 ENCOUNTER — HOSPITAL ENCOUNTER (OUTPATIENT)
Dept: INFUSION THERAPY | Age: 84
Discharge: HOME | End: 2024-11-14
Payer: MEDICARE

## 2024-11-14 ENCOUNTER — OFFICE VISIT (OUTPATIENT)
Dept: ONCOLOGY | Age: 84
End: 2024-11-14
Payer: MEDICARE

## 2024-11-14 VITALS
HEART RATE: 85 BPM | TEMPERATURE: 97.8 F | SYSTOLIC BLOOD PRESSURE: 150 MMHG | BODY MASS INDEX: 25.39 KG/M2 | OXYGEN SATURATION: 98 % | WEIGHT: 161.8 LBS | HEIGHT: 67 IN | DIASTOLIC BLOOD PRESSURE: 70 MMHG

## 2024-11-14 DIAGNOSIS — D64.9 ANEMIA, UNSPECIFIED TYPE: Primary | ICD-10-CM

## 2024-11-14 PROCEDURE — 1126F AMNT PAIN NOTED NONE PRSNT: CPT | Performed by: INTERNAL MEDICINE

## 2024-11-14 PROCEDURE — 99213 OFFICE O/P EST LOW 20 MIN: CPT | Performed by: INTERNAL MEDICINE

## 2024-11-14 PROCEDURE — G8417 CALC BMI ABV UP PARAM F/U: HCPCS | Performed by: INTERNAL MEDICINE

## 2024-11-14 PROCEDURE — 3077F SYST BP >= 140 MM HG: CPT | Performed by: INTERNAL MEDICINE

## 2024-11-14 PROCEDURE — 1123F ACP DISCUSS/DSCN MKR DOCD: CPT | Performed by: INTERNAL MEDICINE

## 2024-11-14 PROCEDURE — 1036F TOBACCO NON-USER: CPT | Performed by: INTERNAL MEDICINE

## 2024-11-14 PROCEDURE — 1159F MED LIST DOCD IN RCRD: CPT | Performed by: INTERNAL MEDICINE

## 2024-11-14 PROCEDURE — 99211 OFF/OP EST MAY X REQ PHY/QHP: CPT

## 2024-11-14 PROCEDURE — G8427 DOCREV CUR MEDS BY ELIG CLIN: HCPCS | Performed by: INTERNAL MEDICINE

## 2024-11-14 PROCEDURE — G8484 FLU IMMUNIZE NO ADMIN: HCPCS | Performed by: INTERNAL MEDICINE

## 2024-11-14 PROCEDURE — 3078F DIAST BP <80 MM HG: CPT | Performed by: INTERNAL MEDICINE

## 2024-11-14 NOTE — PROGRESS NOTES
MA Rooming Questions  Patient: Thor Jha  MRN: [id number]    Date: 11/14/2024        1. Do you have any new issues?   no         2. Do you need any refills on medications?    no    3. Have you had any imaging done since your last visit?   yes - labs 10/31     4. Have you been hospitalized or seen in the emergency room since your last visit here?   no    5. Did the patient have a depression screening completed today? No    No data recorded     PHQ-9 Given to (if applicable):               PHQ-9 Score (if applicable):                     [] Positive     []  Negative              Does question #9 need addressed (if applicable)                     [] Yes    []  No               Antoinette Mendiola CMA

## 2024-11-26 DIAGNOSIS — E11.22 TYPE 2 DIABETES MELLITUS WITH STAGE 3 CHRONIC KIDNEY DISEASE, WITHOUT LONG-TERM CURRENT USE OF INSULIN, UNSPECIFIED WHETHER STAGE 3A OR 3B CKD (HCC): ICD-10-CM

## 2024-11-26 DIAGNOSIS — N18.30 TYPE 2 DIABETES MELLITUS WITH STAGE 3 CHRONIC KIDNEY DISEASE, WITHOUT LONG-TERM CURRENT USE OF INSULIN, UNSPECIFIED WHETHER STAGE 3A OR 3B CKD (HCC): ICD-10-CM

## 2024-11-27 RX ORDER — GLIPIZIDE 10 MG/1
TABLET, FILM COATED, EXTENDED RELEASE ORAL
Qty: 180 TABLET | Refills: 1 | Status: SHIPPED | OUTPATIENT
Start: 2024-11-27

## 2024-12-21 NOTE — PROGRESS NOTES
Rate and Rhythm: Normal rate and regular rhythm.      Heart sounds: Normal heart sounds. No murmur heard.     No friction rub. No gallop.   Pulmonary:      Effort: Pulmonary effort is normal. No respiratory distress.      Breath sounds: Normal breath sounds. No wheezing or rales.   Abdominal:      General: Bowel sounds are normal. There is no distension.      Palpations: Abdomen is soft. There is no mass.      Tenderness: There is no abdominal tenderness. There is no guarding or rebound.   Musculoskeletal:      Cervical back: Normal range of motion and neck supple.   Lymphadenopathy:      Cervical: No cervical adenopathy.   Skin:     General: Skin is warm and dry.   Neurological:      Mental Status: He is alert.   Psychiatric:         Mood and Affect: Mood normal.         ASSESSMENT:    1. Type 2 diabetes mellitus with stage 3 chronic kidney disease, without long-term current use of insulin, unspecified whether stage 3a or 3b CKD ([facility])    2. Anxiety    3. Takotsubo cardiomyopathy    4. Adenocarcinoma of colon ([facility])    5. Primary hypertension    6. Controlled type 2 diabetes mellitus without complication, without long-term current use of insulin ([facility])    7. Prostate cancer ([facility])        PLAN:  Diabetes is poorly controlled, have convinced him to try insulin and we will start at 10 units nightly, gradually titrating up to a maximum of 20 units trying to aim for sugars between 101 -140.  Patient Instructions   Start insulin at 10 units nightly.  Then if sugars in a week are still >140 yousif am, incr to 15 units.  Lastly if in two weeks after on 15 units you are still >140 in am, incr to 20 units/nightly.    Call us and decrease insulin to previous dose if sugars are dropping below 100 in am.  REGARDLESS, CALL US BACK IN ONE MONTH W YOUR AVERAGE SUGARS IN AM.      Decrease your glipizide from twice/day to only once/day in am.    Anxiety stable with Ativan as needed and refill given    Takotsubo cardiomyopathy stable and

## 2024-12-23 ENCOUNTER — OFFICE VISIT (OUTPATIENT)
Dept: INTERNAL MEDICINE CLINIC | Age: 84
End: 2024-12-23

## 2024-12-23 VITALS
DIASTOLIC BLOOD PRESSURE: 68 MMHG | SYSTOLIC BLOOD PRESSURE: 128 MMHG | OXYGEN SATURATION: 97 % | HEART RATE: 76 BPM | HEIGHT: 67 IN | BODY MASS INDEX: 26.06 KG/M2 | WEIGHT: 166 LBS

## 2024-12-23 DIAGNOSIS — C61 PROSTATE CANCER (HCC): ICD-10-CM

## 2024-12-23 DIAGNOSIS — N18.30 TYPE 2 DIABETES MELLITUS WITH STAGE 3 CHRONIC KIDNEY DISEASE, WITHOUT LONG-TERM CURRENT USE OF INSULIN, UNSPECIFIED WHETHER STAGE 3A OR 3B CKD (HCC): Primary | ICD-10-CM

## 2024-12-23 DIAGNOSIS — F41.9 ANXIETY: ICD-10-CM

## 2024-12-23 DIAGNOSIS — I10 PRIMARY HYPERTENSION: ICD-10-CM

## 2024-12-23 DIAGNOSIS — C18.9 ADENOCARCINOMA OF COLON (HCC): ICD-10-CM

## 2024-12-23 DIAGNOSIS — E11.9 CONTROLLED TYPE 2 DIABETES MELLITUS WITHOUT COMPLICATION, WITHOUT LONG-TERM CURRENT USE OF INSULIN (HCC): ICD-10-CM

## 2024-12-23 DIAGNOSIS — I51.81 TAKOTSUBO CARDIOMYOPATHY: ICD-10-CM

## 2024-12-23 DIAGNOSIS — E11.22 TYPE 2 DIABETES MELLITUS WITH STAGE 3 CHRONIC KIDNEY DISEASE, WITHOUT LONG-TERM CURRENT USE OF INSULIN, UNSPECIFIED WHETHER STAGE 3A OR 3B CKD (HCC): Primary | ICD-10-CM

## 2024-12-23 RX ORDER — GLIPIZIDE 10 MG/1
10 TABLET, FILM COATED, EXTENDED RELEASE ORAL DAILY
Qty: 90 TABLET | Refills: 1 | Status: SHIPPED | OUTPATIENT
Start: 2024-12-23

## 2024-12-23 RX ORDER — INSULIN GLARGINE 100 [IU]/ML
20 INJECTION, SOLUTION SUBCUTANEOUS NIGHTLY
Qty: 5 ADJUSTABLE DOSE PRE-FILLED PEN SYRINGE | Refills: 1 | Status: SHIPPED | OUTPATIENT
Start: 2024-12-23

## 2024-12-23 RX ORDER — LORAZEPAM 0.5 MG/1
0.5 TABLET ORAL 2 TIMES DAILY PRN
Qty: 30 TABLET | Refills: 0 | Status: SHIPPED | OUTPATIENT
Start: 2024-12-23 | End: 2025-01-22

## 2024-12-23 NOTE — PATIENT INSTRUCTIONS
Start insulin at 10 units nightly.  Then if sugars in a week are still >140 yousif am, incr to 15 units.  Lastly if in two weeks after on 15 units you are still >140 in am, incr to 20 units/nightly.    Call us and decrease insulin to previous dose if sugars are dropping below 100 in am.  REGARDLESS, CALL US BACK IN ONE MONTH W YOUR AVERAGE SUGARS IN AM.      Decrease your glipizide from twice/day to only once/day in am.

## 2024-12-30 DIAGNOSIS — E11.22 TYPE 2 DIABETES MELLITUS WITH STAGE 3B CHRONIC KIDNEY DISEASE, WITHOUT LONG-TERM CURRENT USE OF INSULIN (HCC): Primary | ICD-10-CM

## 2024-12-30 DIAGNOSIS — N18.32 TYPE 2 DIABETES MELLITUS WITH STAGE 3B CHRONIC KIDNEY DISEASE, WITHOUT LONG-TERM CURRENT USE OF INSULIN (HCC): Primary | ICD-10-CM

## 2024-12-30 DIAGNOSIS — B35.1 ONYCHOMYCOSIS: ICD-10-CM

## 2024-12-30 RX ORDER — PEN NEEDLE, DIABETIC 30 GX5/16"
1 NEEDLE, DISPOSABLE MISCELLANEOUS DAILY
Qty: 100 EACH | Refills: 3 | Status: SHIPPED | OUTPATIENT
Start: 2024-12-30

## 2024-12-30 RX ORDER — KETOCONAZOLE 20 MG/G
CREAM TOPICAL
Qty: 30 G | Refills: 1 | Status: SHIPPED | OUTPATIENT
Start: 2024-12-30

## 2025-01-14 ENCOUNTER — OFFICE VISIT (OUTPATIENT)
Dept: CARDIOLOGY CLINIC | Age: 85
End: 2025-01-14
Payer: MEDICARE

## 2025-01-14 VITALS
SYSTOLIC BLOOD PRESSURE: 142 MMHG | HEART RATE: 78 BPM | BODY MASS INDEX: 25.16 KG/M2 | WEIGHT: 166 LBS | DIASTOLIC BLOOD PRESSURE: 72 MMHG | HEIGHT: 68 IN

## 2025-01-14 DIAGNOSIS — E11.22 TYPE 2 DIABETES MELLITUS WITH STAGE 3B CHRONIC KIDNEY DISEASE, WITHOUT LONG-TERM CURRENT USE OF INSULIN (HCC): ICD-10-CM

## 2025-01-14 DIAGNOSIS — I51.81 TAKOTSUBO CARDIOMYOPATHY: ICD-10-CM

## 2025-01-14 DIAGNOSIS — C61 PROSTATE CANCER (HCC): ICD-10-CM

## 2025-01-14 DIAGNOSIS — I10 PRIMARY HYPERTENSION: Primary | ICD-10-CM

## 2025-01-14 DIAGNOSIS — F41.9 ANXIETY: ICD-10-CM

## 2025-01-14 DIAGNOSIS — E78.2 MIXED HYPERLIPIDEMIA: ICD-10-CM

## 2025-01-14 DIAGNOSIS — N18.32 TYPE 2 DIABETES MELLITUS WITH STAGE 3B CHRONIC KIDNEY DISEASE, WITHOUT LONG-TERM CURRENT USE OF INSULIN (HCC): ICD-10-CM

## 2025-01-14 DIAGNOSIS — E11.9 CONTROLLED TYPE 2 DIABETES MELLITUS WITHOUT COMPLICATION, WITHOUT LONG-TERM CURRENT USE OF INSULIN (HCC): ICD-10-CM

## 2025-01-14 DIAGNOSIS — N18.30 STAGE 3 CHRONIC KIDNEY DISEASE, UNSPECIFIED WHETHER STAGE 3A OR 3B CKD (HCC): ICD-10-CM

## 2025-01-14 DIAGNOSIS — I70.0 ATHEROSCLEROSIS OF AORTA (HCC): ICD-10-CM

## 2025-01-14 PROCEDURE — 93000 ELECTROCARDIOGRAM COMPLETE: CPT | Performed by: INTERNAL MEDICINE

## 2025-01-14 PROCEDURE — 1123F ACP DISCUSS/DSCN MKR DOCD: CPT | Performed by: INTERNAL MEDICINE

## 2025-01-14 PROCEDURE — 3077F SYST BP >= 140 MM HG: CPT | Performed by: INTERNAL MEDICINE

## 2025-01-14 PROCEDURE — G8417 CALC BMI ABV UP PARAM F/U: HCPCS | Performed by: INTERNAL MEDICINE

## 2025-01-14 PROCEDURE — 1036F TOBACCO NON-USER: CPT | Performed by: INTERNAL MEDICINE

## 2025-01-14 PROCEDURE — G8427 DOCREV CUR MEDS BY ELIG CLIN: HCPCS | Performed by: INTERNAL MEDICINE

## 2025-01-14 PROCEDURE — 3078F DIAST BP <80 MM HG: CPT | Performed by: INTERNAL MEDICINE

## 2025-01-14 PROCEDURE — 99214 OFFICE O/P EST MOD 30 MIN: CPT | Performed by: INTERNAL MEDICINE

## 2025-01-14 PROCEDURE — 1159F MED LIST DOCD IN RCRD: CPT | Performed by: INTERNAL MEDICINE

## 2025-01-14 RX ORDER — PHENOL 1.4 %
1 AEROSOL, SPRAY (ML) MUCOUS MEMBRANE DAILY
COMMUNITY

## 2025-01-14 NOTE — PATIENT INSTRUCTIONS
**It is YOUR responsibilty to bring medication bottles and/or updated medication list to EACH APPOINTMENT. This will allow us to better serve you and all your healthcare needs**  Thank you for allowing us to care for you today!   We want to ensure we can follow your treatment plan and we strive to give you the best outcomes and experience possible.   If you ever have a life threatening emergency and call 911 - for an ambulance (EMS)   Our providers can only care for you at:   [facility] or [facility].   Even if you have someone take you or you drive yourself we can only care for you in a [facility]. Our providers are not setup at the other healthcare locations!   Please be informed that if you contact our office outside of normal business hours the physician on call cannot help with any scheduling or rescheduling issues, procedure instruction questions or any type of medication issue.    We advise you for any urgent/emergency that you go to the nearest emergency room!    PLEASE CALL OUR OFFICE DURING NORMAL BUSINESS HOURS    Monday - Friday   8 am to 5 pm    [place]: [phone]    [place]: [phone]    [place]:  [phone]  We are committed to providing you the best care possible.    If you receive a survey after visiting one of our offices, please take time to share your experience concerning your physician office visit.  These surveys are confidential and no health information about you is shared.    We are eager to improve for you and we are counting on your feedback to help make that happen.
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
## 2025-01-23 DIAGNOSIS — E11.22 TYPE 2 DIABETES MELLITUS WITH STAGE 3 CHRONIC KIDNEY DISEASE, WITHOUT LONG-TERM CURRENT USE OF INSULIN, UNSPECIFIED WHETHER STAGE 3A OR 3B CKD (HCC): ICD-10-CM

## 2025-01-23 DIAGNOSIS — N18.30 TYPE 2 DIABETES MELLITUS WITH STAGE 3 CHRONIC KIDNEY DISEASE, WITHOUT LONG-TERM CURRENT USE OF INSULIN, UNSPECIFIED WHETHER STAGE 3A OR 3B CKD (HCC): ICD-10-CM

## 2025-01-24 RX ORDER — EMPAGLIFLOZIN 25 MG/1
25 TABLET, FILM COATED ORAL EVERY MORNING
Qty: 90 TABLET | Refills: 1 | OUTPATIENT
Start: 2025-01-24

## 2025-02-03 DIAGNOSIS — E11.22 TYPE 2 DIABETES MELLITUS WITH STAGE 3 CHRONIC KIDNEY DISEASE, WITHOUT LONG-TERM CURRENT USE OF INSULIN, UNSPECIFIED WHETHER STAGE 3A OR 3B CKD (HCC): ICD-10-CM

## 2025-02-03 DIAGNOSIS — N18.30 TYPE 2 DIABETES MELLITUS WITH STAGE 3 CHRONIC KIDNEY DISEASE, WITHOUT LONG-TERM CURRENT USE OF INSULIN, UNSPECIFIED WHETHER STAGE 3A OR 3B CKD (HCC): ICD-10-CM

## 2025-02-03 RX ORDER — EMPAGLIFLOZIN 25 MG/1
25 TABLET, FILM COATED ORAL EVERY MORNING
Qty: 90 TABLET | Refills: 1 | OUTPATIENT
Start: 2025-02-03

## 2025-03-18 DIAGNOSIS — I10 ESSENTIAL HYPERTENSION: ICD-10-CM

## 2025-03-18 RX ORDER — AMLODIPINE BESYLATE 5 MG/1
5 TABLET ORAL DAILY
Qty: 90 TABLET | Refills: 1 | Status: SHIPPED | OUTPATIENT
Start: 2025-03-18

## 2025-03-20 LAB
A/G RATIO: 2 RATIO (ref 0.8–2.6)
ALBUMIN: 4.3 G/DL (ref 3.5–5.2)
ALP BLD-CCNC: 89 U/L (ref 23–144)
ALT SERPL-CCNC: 22 U/L (ref 0–60)
AST SERPL-CCNC: 23 U/L (ref 0–55)
BASOPHILS ABSOLUTE: 0.1 K/UL (ref 0–0.3)
BASOPHILS RELATIVE PERCENT: 1.4 % (ref 0–2)
BILIRUB SERPL-MCNC: 0.5 MG/DL (ref 0–1.2)
BUN / CREAT RATIO: 26 (ref 7–25)
BUN BLDV-MCNC: 39 MG/DL (ref 3–29)
CALCIUM SERPL-MCNC: 9.9 MG/DL (ref 8.5–10.5)
CHLORIDE BLD-SCNC: 101 MEQ/L (ref 96–110)
CHOLESTEROL, TOTAL: 173 MG/DL
CO2: 29 MEQ/L (ref 19–32)
CREAT SERPL-MCNC: 1.5 MG/DL (ref 0.5–1.2)
CREATININE URINE: 90.2 MG/DL
DIFFERENTIAL COUNT: ABNORMAL
EOSINOPHILS ABSOLUTE: 0.2 K/UL (ref 0–0.5)
EOSINOPHILS RELATIVE PERCENT: 6.4 % (ref 0–5)
ESTIMATED GLOMERULAR FILTRATION RATE CREATININE EQUATION: 46 MLS/MIN/1.73M2
FASTING STATUS: ABNORMAL
GLOBULIN: 2.1 G/DL (ref 1.9–3.6)
GLUCOSE BLD-MCNC: 162 MG/DL (ref 70–99)
HBA1C MFR BLD: 8.9 %
HCT VFR BLD CALC: 42.6 % (ref 37.5–51)
HDLC SERPL-MCNC: 70 MG/DL
HEMOGLOBIN: 14.2 G/DL (ref 13–17.7)
IMMATURE GRANS (ABS): 0 K/UL (ref 0–0.1)
IMMATURE GRANULOCYTES %: 0.3 %
LDL CHOLESTEROL: 87 MG/DL
LYMPHOCYTES ABSOLUTE: 1.8 K/UL (ref 0.9–4.1)
LYMPHOCYTES RELATIVE PERCENT: 48.7 % (ref 14–51)
MCH RBC QN AUTO: 32.6 PG (ref 26–34)
MCHC RBC AUTO-ENTMCNC: 33.3 G/DL (ref 30.7–35.5)
MCV RBC AUTO: 97.7 FL (ref 80–100)
MICROALBUMIN/CREAT 24H UR: 2380 MCG/DL
MICROALBUMIN/CREAT UR-RTO: 26 MCG/MG CREAT.
MONOCYTES ABSOLUTE: 0.3 K/UL (ref 0.2–1)
MONOCYTES RELATIVE PERCENT: 9.5 % (ref 4–12)
NEUTROPHILS ABSOLUTE: 1.2 K/UL (ref 1.8–7.5)
NEUTROPHILS RELATIVE PERCENT: 33.7 % (ref 42–80)
PDW BLD-RTO: 12.7 %
PLATELET # BLD: 207 K/UL (ref 140–400)
PMV BLD AUTO: 11.6 FL (ref 7.2–11.7)
POTASSIUM SERPL-SCNC: 4.8 MEQ/L (ref 3.4–5.3)
RBC # BLD: 4.36 M/UL (ref 4.14–5.8)
RETICULOCYTE ABSOLUTE COUNT: 0 /100 WBC
SODIUM BLD-SCNC: 142 MEQ/L (ref 135–148)
TOTAL PROTEIN: 6.4 G/DL (ref 6–8.3)
TRIGL SERPL-MCNC: 78 MG/DL
VLDLC SERPL CALC-MCNC: 16 MG/DL (ref 4–38)
WBC # BLD: 3.6 K/UL (ref 3.5–10.9)

## 2025-03-21 ENCOUNTER — RESULTS FOLLOW-UP (OUTPATIENT)
Dept: INTERNAL MEDICINE CLINIC | Age: 85
End: 2025-03-21

## 2025-03-21 DIAGNOSIS — I10 ESSENTIAL HYPERTENSION: ICD-10-CM

## 2025-03-21 RX ORDER — HYDROCHLOROTHIAZIDE 25 MG/1
12.5 TABLET ORAL DAILY
Qty: 45 TABLET | Refills: 1 | Status: SHIPPED | OUTPATIENT
Start: 2025-03-21

## 2025-03-21 NOTE — RESULT ENCOUNTER NOTE
Chol, sugars, labs appear in stable range, DM control is much better, avg sugar the A1c has dropped from 10.3-8.9.   chol has dropped from 184-173.  MOST IMPORTANTLY THE PROTEIN IN URINE HAS IMPROVED SIGNIFICANTLY INDICATING IMPROVED KIDNEY FXN OVERALL.  notify pt please.-

## 2025-03-24 NOTE — PROGRESS NOTES
Madansalty SANDRA Jha  1940 03/24/25    SUBJECTIVE:    DM-  A1c improved after on insulin- lantus 10 units.  In am can have low glc to 70-80.    Lab Results   Component Value Date    LABA1C 8.9 (H) 03/20/2025    LABA1C 10.3 (H) 10/31/2024    LABA1C 9.4 (H) 06/11/2024     Lab Results   Component Value Date    CREATININE 1.5 (H) 03/20/2025     Colon ca, had colonoscopy last yr Dec Dr Khan, ?optional recheck, 3 yrs.    Hypertension: Stable. Denies CP, SOB, cough, visual changes, dizziness, palpitations or HA.      Lipids:  Is continuing statin therapy and low fat diet.  Tolerating medications w/o myalgias or GI upset.      Prostate CA, stable on meds.  Sees Dr Jauregui.      L eye wet mac degen, getting shots fr Dr Umana       ANXIETY STABLE W ATIVAN AND RF DUE.  Controlled substances monitoring: possible medication side effects, risk of tolerance and/or dependence, and alternative treatments discussed, no signs of potential drug abuse or diversion identified and OARRS report reviewed today- activity consistent with treatment plan.    Cardiomyopathy, stable also seeing Dr Pepper- NO CURRENT SX CP OR SOB.  OBJECTIVE:    /76   Pulse 82   Resp 16   Ht 1.727 m (5' 7.99\")   Wt 79.4 kg (175 lb)   SpO2 99%   BMI 26.61 kg/m²     Physical Exam  Constitutional:       Appearance: Normal appearance.   Cardiovascular:      Rate and Rhythm: Normal rate and regular rhythm.      Heart sounds: No murmur heard.     No gallop.   Pulmonary:      Effort: No respiratory distress.      Breath sounds: No wheezing.   Abdominal:      General: Abdomen is flat. Bowel sounds are normal. There is no distension.      Palpations: Abdomen is soft. There is no mass.      Tenderness: There is no abdominal tenderness.      Hernia: No hernia is present.   Musculoskeletal:      Right lower leg: No edema.      Left lower leg: No edema.   Neurological:      Mental Status: He is alert.   Psychiatric:         Mood and Affect: Mood normal.

## 2025-03-25 ENCOUNTER — OFFICE VISIT (OUTPATIENT)
Dept: INTERNAL MEDICINE CLINIC | Age: 85
End: 2025-03-25
Payer: MEDICARE

## 2025-03-25 VITALS
OXYGEN SATURATION: 99 % | SYSTOLIC BLOOD PRESSURE: 130 MMHG | WEIGHT: 175 LBS | BODY MASS INDEX: 26.52 KG/M2 | DIASTOLIC BLOOD PRESSURE: 76 MMHG | HEIGHT: 68 IN | HEART RATE: 82 BPM | RESPIRATION RATE: 16 BRPM

## 2025-03-25 DIAGNOSIS — I10 ESSENTIAL HYPERTENSION: ICD-10-CM

## 2025-03-25 DIAGNOSIS — H35.3210 EXUDATIVE AGE-RELATED MACULAR DEGENERATION OF RIGHT EYE, UNSPECIFIED STAGE (HCC): ICD-10-CM

## 2025-03-25 DIAGNOSIS — E78.2 MIXED HYPERLIPIDEMIA: ICD-10-CM

## 2025-03-25 DIAGNOSIS — C18.9 ADENOCARCINOMA OF COLON: ICD-10-CM

## 2025-03-25 DIAGNOSIS — F41.9 ANXIETY: ICD-10-CM

## 2025-03-25 DIAGNOSIS — C61 PROSTATE CANCER (HCC): ICD-10-CM

## 2025-03-25 DIAGNOSIS — N18.30 TYPE 2 DIABETES MELLITUS WITH STAGE 3 CHRONIC KIDNEY DISEASE, WITHOUT LONG-TERM CURRENT USE OF INSULIN, UNSPECIFIED WHETHER STAGE 3A OR 3B CKD (HCC): Primary | ICD-10-CM

## 2025-03-25 DIAGNOSIS — E11.22 TYPE 2 DIABETES MELLITUS WITH STAGE 3 CHRONIC KIDNEY DISEASE, WITHOUT LONG-TERM CURRENT USE OF INSULIN, UNSPECIFIED WHETHER STAGE 3A OR 3B CKD (HCC): Primary | ICD-10-CM

## 2025-03-25 PROCEDURE — 3075F SYST BP GE 130 - 139MM HG: CPT | Performed by: INTERNAL MEDICINE

## 2025-03-25 PROCEDURE — G8427 DOCREV CUR MEDS BY ELIG CLIN: HCPCS | Performed by: INTERNAL MEDICINE

## 2025-03-25 PROCEDURE — 3078F DIAST BP <80 MM HG: CPT | Performed by: INTERNAL MEDICINE

## 2025-03-25 PROCEDURE — G8417 CALC BMI ABV UP PARAM F/U: HCPCS | Performed by: INTERNAL MEDICINE

## 2025-03-25 PROCEDURE — G2211 COMPLEX E/M VISIT ADD ON: HCPCS | Performed by: INTERNAL MEDICINE

## 2025-03-25 PROCEDURE — 3052F HG A1C>EQUAL 8.0%<EQUAL 9.0%: CPT | Performed by: INTERNAL MEDICINE

## 2025-03-25 PROCEDURE — 1123F ACP DISCUSS/DSCN MKR DOCD: CPT | Performed by: INTERNAL MEDICINE

## 2025-03-25 PROCEDURE — 1159F MED LIST DOCD IN RCRD: CPT | Performed by: INTERNAL MEDICINE

## 2025-03-25 PROCEDURE — 1160F RVW MEDS BY RX/DR IN RCRD: CPT | Performed by: INTERNAL MEDICINE

## 2025-03-25 PROCEDURE — 99214 OFFICE O/P EST MOD 30 MIN: CPT | Performed by: INTERNAL MEDICINE

## 2025-03-25 PROCEDURE — 1036F TOBACCO NON-USER: CPT | Performed by: INTERNAL MEDICINE

## 2025-03-25 RX ORDER — LORAZEPAM 0.5 MG/1
0.5 TABLET ORAL 2 TIMES DAILY PRN
Qty: 30 TABLET | Refills: 0 | Status: SHIPPED | OUTPATIENT
Start: 2025-03-25 | End: 2025-04-24

## 2025-03-25 SDOH — ECONOMIC STABILITY: FOOD INSECURITY: WITHIN THE PAST 12 MONTHS, YOU WORRIED THAT YOUR FOOD WOULD RUN OUT BEFORE YOU GOT MONEY TO BUY MORE.: NEVER TRUE

## 2025-03-25 SDOH — ECONOMIC STABILITY: FOOD INSECURITY: WITHIN THE PAST 12 MONTHS, THE FOOD YOU BOUGHT JUST DIDN'T LAST AND YOU DIDN'T HAVE MONEY TO GET MORE.: NEVER TRUE

## 2025-03-25 ASSESSMENT — PATIENT HEALTH QUESTIONNAIRE - PHQ9
SUM OF ALL RESPONSES TO PHQ QUESTIONS 1-9: 0
1. LITTLE INTEREST OR PLEASURE IN DOING THINGS: NOT AT ALL
SUM OF ALL RESPONSES TO PHQ QUESTIONS 1-9: 0
2. FEELING DOWN, DEPRESSED OR HOPELESS: NOT AT ALL
SUM OF ALL RESPONSES TO PHQ QUESTIONS 1-9: 0
SUM OF ALL RESPONSES TO PHQ QUESTIONS 1-9: 0

## 2025-04-20 NOTE — PROGRESS NOTES
Patient Name:  Thor Jha  Patient :  1940  Patient MRN:  [id number]     Primary Oncologist: Arlene Hall MD  Referring Provider: Sterling Kaye MD     Date of Service: 5/15/2025      Chief Complaint:    No chief complaint on file.     FU visit.    Patient Active Problem List:     Hyperlipidemia     Adenocarcinoma of colon     Atherosclerosis of aorta      Anxiety     Onychomycosis     Controlled type 2 diabetes mellitus without complication, without long-term current use of insulin      Age-related macular degeneration, wet, right eye      Takotsubo cardiomyopathy     Hypertension     Other neutropenia (HCC)     HPI:   Thor Jha is a pleasant 84 year old male patient who was referred for evaluation of leukopenia.   2022 creatinine 1.4. LFTs wnl.  2022 WBC: 2.8 (L) RBC: 4.75 Hemoglobin: 15.6 Hematocrit: 46.0 MCV: 96.7 Platelet Count: 149  Basophils %: 0.5 Neutrophils Absolute: 1.1 (L) Lymphocytes Absolute: 1.2 Monocytes Absolute: 0.3 Eosinophils Absolute: 0.2 Basophils Absolute: 0.0    WBC has been fluctuating between 2.8 and 4.7.  He denied any frequent infection.  I reviewed his medication list.  We discussed about potential bone marrow study to rule out myelodysplasia or other bone marrow diseases.  May 10, 2022 WBC 2.98, hemoglobin 15.7, platelet 160.  Absolute neutrophil 1.53.    He had history of rectal cancer in  status post chemo and radiation therapy.  Colonoscopy was in 2021.  Follow-up in 3 years.  2022 WBC 2.7, Hg 15.2, plt 169. Retic 1.4. ANC 1.3.    6/15/2022 Bone marrow, biopsy, clot section, and aspirate smears:   -     Hemodilute specimen, see comment.   Peripheral blood smear:   -     Leukopenia.     Comment   The core biopsy shows bone trabeculae with fat. No cellular bone marrow tissue is seen. Peripheral blood counts are not low enough to suggest an essentially acellular marrow, therefore, the biopsy may have been taken from a particularly

## 2025-04-29 ENCOUNTER — TELEPHONE (OUTPATIENT)
Dept: INTERNAL MEDICINE CLINIC | Age: 85
End: 2025-04-29

## 2025-04-29 NOTE — TELEPHONE ENCOUNTER
Approved  PA Detail   Prior authorization approved  Payer: Jessica ([employer]) Case ID: [id number]    [phone]    [phone]  Note from payer: Your request has been approved  Approval Details    Authorized from January 1, 2025 to April 29, 2026  Electronic appeal: Not supported  Prior auth initiated by: [facility]pharm[facility] #[id number] - [place], [place] - [address] [phone]  [phone]  View History

## 2025-05-05 ENCOUNTER — TELEMEDICINE (OUTPATIENT)
Dept: INTERNAL MEDICINE CLINIC | Age: 85
End: 2025-05-05
Payer: MEDICARE

## 2025-05-05 DIAGNOSIS — C61 PROSTATE CANCER (HCC): ICD-10-CM

## 2025-05-05 DIAGNOSIS — R50.9 FEVER, UNSPECIFIED FEVER CAUSE: Primary | ICD-10-CM

## 2025-05-05 LAB
BILIRUBIN, URINE: NORMAL MG/DL
CLARITY, UA: NORMAL
COLOR, UA: NORMAL
GLUCOSE URINE: NORMAL MG/DL
KETONES, URINE: NORMAL MG/DL
LEUKOCYTE ESTERASE, URINE: NORMAL
NITRITE, URINE: NORMAL
PH, URINE: NORMAL (ref 4.5–8)
PROTEIN, URINE: NORMAL MG/DL
SPECIFIC GRAVITY UA: NORMAL (ref 1–1.03)
URINE HGB: NORMAL MG/DL
UROBILINOGEN, URINE: NORMAL MG/DL (ref 0–2)

## 2025-05-05 PROCEDURE — 1159F MED LIST DOCD IN RCRD: CPT | Performed by: INTERNAL MEDICINE

## 2025-05-05 PROCEDURE — 1123F ACP DISCUSS/DSCN MKR DOCD: CPT | Performed by: INTERNAL MEDICINE

## 2025-05-05 PROCEDURE — 1160F RVW MEDS BY RX/DR IN RCRD: CPT | Performed by: INTERNAL MEDICINE

## 2025-05-05 PROCEDURE — 99213 OFFICE O/P EST LOW 20 MIN: CPT | Performed by: INTERNAL MEDICINE

## 2025-05-05 PROCEDURE — G2211 COMPLEX E/M VISIT ADD ON: HCPCS | Performed by: INTERNAL MEDICINE

## 2025-05-05 PROCEDURE — G8427 DOCREV CUR MEDS BY ELIG CLIN: HCPCS | Performed by: INTERNAL MEDICINE

## 2025-05-05 PROCEDURE — 1036F TOBACCO NON-USER: CPT | Performed by: INTERNAL MEDICINE

## 2025-05-05 PROCEDURE — G8417 CALC BMI ABV UP PARAM F/U: HCPCS | Performed by: INTERNAL MEDICINE

## 2025-05-05 RX ORDER — CIPROFLOXACIN 250 MG/1
250 TABLET, FILM COATED ORAL 2 TIMES DAILY
Qty: 14 TABLET | Refills: 0 | Status: SHIPPED | OUTPATIENT
Start: 2025-05-05 | End: 2025-05-08

## 2025-05-05 NOTE — PROGRESS NOTES
2025    TELEHEALTH EVALUATION -- Audio/Visual    HPI:    Tohr Jha (:  1940) has requested an audio/video evaluation for the following concern(s):- phone visit today/audio    The past 5-d w onset of fever to 101, took some ibuprofen w temporary benefit.  Mild aches and feels weaker.  No sinus congestion, cough.  Denies dysuria.  No wheezing or SOB.  Denies abd pain, did have diarrhea initially.  BMs now nl.  Denies ill contacts    Hx of prostate CA, on meds but no surgery or RT.        Review of Systems    Prior to Visit Medications    Medication Sig Taking? Authorizing Provider   Insulin Pen Needle 31G X 5 MM MISC 1 each by Does not apply route daily Yes Sterling Kaye MD   hydroCHLOROthiazide (HYDRODIURIL) 25 MG tablet TAKE 1/2 TABLET BY MOUTH DAILY Yes Sterling Kaye MD   amLODIPine (NORVASC) 5 MG tablet TAKE 1 TABLET BY MOUTH EVERY DAY Yes Sterling Kaye MD   empagliflozin (JARDIANCE) 25 MG tablet Take 1 tablet by mouth every morning Yes Sterling Kaye MD   calcium carbonate 600 MG TABS tablet Take 1 tablet by mouth daily Yes ProviderNina MD   ketoconazole (NIZORAL) 2 % cream APPLY TO AFFECTED AREA TOPICALLY EVERY DAY Yes Sterling Kaye MD   Insulin Pen Needle (PEN NEEDLES 3/16\") 31G X 5 MM MISC 1 each by Does not apply route daily Use as directed on package with Lantus 20 units QHS Yes Sterling Kaye MD   insulin glargine (LANTUS SOLOSTAR) 100 UNIT/ML injection pen Inject 20 Units into the skin nightly Yes Sterling Kaye MD   glipiZIDE (GLUCOTROL XL) 10 MG extended release tablet Take 1 tablet by mouth daily Yes Sterling Kaye MD   ONETOUCH ULTRA strip 1 EACH BY OTHER ROUTE 3 TIMES DAILY AS NEEDED. Yes Sterling Kaye MD   Relugolix (ORGOVYX) 120 MG TABS Take by mouth Yes ProviderNina MD   tamsulosin (FLOMAX) 0.4 MG capsule Take 1 capsule by mouth daily Yes ProviderNina MD   lisinopril (PRINIVIL;ZESTRIL) 40 MG

## 2025-05-06 LAB
BACTERIA, URINE: ABNORMAL /HPF
BILIRUBIN, URINE: NEGATIVE MG/DL
CLARITY, UA: CLEAR
COLOR, UA: YELLOW
EPITHELIAL CELLS, UA: ABNORMAL /HPF (ref 0–5)
EPITHELIAL CELLS, UA: ABNORMAL /HPF (ref 0–5)
ERYTHROCYTES URINE: ABNORMAL /HPF (ref 0–2)
GLUCOSE URINE: >1000 MG/DL
KETONES, URINE: NEGATIVE MG/DL
LEUKOCYTE ESTERASE, URINE: ABNORMAL
LEUKOCYTES, UA: ABNORMAL /HPF (ref 0–5)
NITRITE, URINE: NEGATIVE
PH, URINE: 5.5 (ref 4.5–8)
PROTEIN, URINE: NEGATIVE MG/DL
SPECIFIC GRAVITY UA: 1.03 (ref 1–1.03)
URINE HGB: NEGATIVE MG/DL
UROBILINOGEN, URINE: <2 MG/DL (ref 0–2)

## 2025-05-07 ENCOUNTER — RESULTS FOLLOW-UP (OUTPATIENT)
Dept: INTERNAL MEDICINE CLINIC | Age: 85
End: 2025-05-07

## 2025-05-07 NOTE — RESULT ENCOUNTER NOTE
Please CALL OR TEXT PT, HIS BLADDER INFECTION ON CULTURE SHOWS THE CIPRO SHOULD BE EFFECTIVE SO PLS MAINTAIN ADEQUATE FLUID HYDRATION AND FINISH ABX.

## 2025-05-08 ENCOUNTER — TELEPHONE (OUTPATIENT)
Dept: INTERNAL MEDICINE CLINIC | Age: 85
End: 2025-05-08

## 2025-05-08 RX ORDER — SULFAMETHOXAZOLE AND TRIMETHOPRIM 800; 160 MG/1; MG/1
1 TABLET ORAL 2 TIMES DAILY
Qty: 14 TABLET | Refills: 0 | Status: SHIPPED | OUTPATIENT
Start: 2025-05-08 | End: 2025-05-15

## 2025-05-11 DIAGNOSIS — E78.2 MIXED HYPERLIPIDEMIA: ICD-10-CM

## 2025-05-12 RX ORDER — ROSUVASTATIN CALCIUM 10 MG/1
10 TABLET, COATED ORAL DAILY
Qty: 90 TABLET | Refills: 1 | Status: SHIPPED | OUTPATIENT
Start: 2025-05-12

## 2025-05-15 ENCOUNTER — HOSPITAL ENCOUNTER (OUTPATIENT)
Dept: INFUSION THERAPY | Age: 85
Discharge: HOME | End: 2025-05-15
Payer: MEDICARE

## 2025-05-15 ENCOUNTER — OFFICE VISIT (OUTPATIENT)
Dept: ONCOLOGY | Age: 85
End: 2025-05-15
Payer: MEDICARE

## 2025-05-15 VITALS
OXYGEN SATURATION: 98 % | DIASTOLIC BLOOD PRESSURE: 78 MMHG | HEART RATE: 86 BPM | RESPIRATION RATE: 16 BRPM | WEIGHT: 174.8 LBS | TEMPERATURE: 98.2 F | BODY MASS INDEX: 26.49 KG/M2 | HEIGHT: 68 IN | SYSTOLIC BLOOD PRESSURE: 130 MMHG

## 2025-05-15 DIAGNOSIS — D72.819 LEUKOPENIA, UNSPECIFIED TYPE: ICD-10-CM

## 2025-05-15 DIAGNOSIS — D72.819 LEUKOPENIA, UNSPECIFIED TYPE: Primary | ICD-10-CM

## 2025-05-15 DIAGNOSIS — D64.9 ANEMIA, UNSPECIFIED TYPE: ICD-10-CM

## 2025-05-15 LAB
ALBUMIN SERPL-MCNC: 3.9 G/DL (ref 3.4–5)
ALBUMIN/GLOB SERPL: 1.7 {RATIO} (ref 1.1–2.2)
ALP SERPL-CCNC: 75 U/L (ref 40–129)
ALT SERPL-CCNC: 25 U/L (ref 10–40)
ANION GAP SERPL CALCULATED.3IONS-SCNC: 13 MMOL/L (ref 9–17)
AST SERPL-CCNC: 21 U/L (ref 15–37)
BASOPHILS # BLD: 0.03 K/UL
BASOPHILS NFR BLD: 1 % (ref 0–1)
BILIRUB SERPL-MCNC: 0.4 MG/DL (ref 0–1)
BUN SERPL-MCNC: 30 MG/DL (ref 7–20)
CALCIUM SERPL-MCNC: 9.3 MG/DL (ref 8.3–10.6)
CHLORIDE SERPL-SCNC: 101 MMOL/L (ref 99–110)
CO2 SERPL-SCNC: 25 MMOL/L (ref 21–32)
CREAT SERPL-MCNC: 1.3 MG/DL (ref 0.8–1.3)
EOSINOPHIL # BLD: 0.13 K/UL
EOSINOPHILS RELATIVE PERCENT: 4 % (ref 0–3)
ERYTHROCYTE [DISTWIDTH] IN BLOOD BY AUTOMATED COUNT: 13 % (ref 11.7–14.9)
FERRITIN SERPL-MCNC: 234 NG/ML (ref 30–400)
FOLATE SERPL-MCNC: 17.9 NG/ML (ref 4.8–24.2)
GFR, ESTIMATED: 54 ML/MIN/1.73M2
GLUCOSE SERPL-MCNC: 209 MG/DL (ref 74–99)
HCT VFR BLD AUTO: 43 % (ref 42–52)
HGB BLD-MCNC: 14.1 G/DL (ref 13.5–18)
IRON SATN MFR SERPL: 32 % (ref 15–50)
IRON SERPL-MCNC: 88 UG/DL (ref 59–158)
LYMPHOCYTES NFR BLD: 1.17 K/UL
LYMPHOCYTES RELATIVE PERCENT: 32 % (ref 24–44)
MCH RBC QN AUTO: 31.9 PG (ref 27–31)
MCHC RBC AUTO-ENTMCNC: 32.8 G/DL (ref 32–36)
MCV RBC AUTO: 97.3 FL (ref 78–100)
MONOCYTES NFR BLD: 0.26 K/UL
MONOCYTES NFR BLD: 7 % (ref 0–5)
NEUTROPHILS NFR BLD: 57 % (ref 36–66)
NEUTS SEG NFR BLD: 2.07 K/UL
PLATELET # BLD AUTO: 239 K/UL (ref 140–440)
PMV BLD AUTO: 10 FL (ref 7.5–11.1)
POTASSIUM SERPL-SCNC: 4.2 MMOL/L (ref 3.5–5.1)
PROT SERPL-MCNC: 6.2 G/DL (ref 6.4–8.2)
RBC # BLD AUTO: 4.42 M/UL (ref 4.6–6.2)
SODIUM SERPL-SCNC: 139 MMOL/L (ref 136–145)
TIBC SERPL-MCNC: 276 UG/DL (ref 260–445)
UNSATURATED IRON BINDING CAPACITY: 188 UG/DL (ref 110–370)
VIT B12 SERPL-MCNC: 372 PG/ML (ref 211–911)
WBC OTHER # BLD: 3.7 K/UL (ref 4–10.5)

## 2025-05-15 PROCEDURE — 82746 ASSAY OF FOLIC ACID SERUM: CPT

## 2025-05-15 PROCEDURE — 99212 OFFICE O/P EST SF 10 MIN: CPT

## 2025-05-15 PROCEDURE — 83550 IRON BINDING TEST: CPT

## 2025-05-15 PROCEDURE — G8417 CALC BMI ABV UP PARAM F/U: HCPCS | Performed by: INTERNAL MEDICINE

## 2025-05-15 PROCEDURE — 1159F MED LIST DOCD IN RCRD: CPT | Performed by: INTERNAL MEDICINE

## 2025-05-15 PROCEDURE — 1126F AMNT PAIN NOTED NONE PRSNT: CPT | Performed by: INTERNAL MEDICINE

## 2025-05-15 PROCEDURE — 85025 COMPLETE CBC W/AUTO DIFF WBC: CPT

## 2025-05-15 PROCEDURE — G8427 DOCREV CUR MEDS BY ELIG CLIN: HCPCS | Performed by: INTERNAL MEDICINE

## 2025-05-15 PROCEDURE — 36415 COLL VENOUS BLD VENIPUNCTURE: CPT

## 2025-05-15 PROCEDURE — 83540 ASSAY OF IRON: CPT

## 2025-05-15 PROCEDURE — 3075F SYST BP GE 130 - 139MM HG: CPT | Performed by: INTERNAL MEDICINE

## 2025-05-15 PROCEDURE — 1036F TOBACCO NON-USER: CPT | Performed by: INTERNAL MEDICINE

## 2025-05-15 PROCEDURE — 1123F ACP DISCUSS/DSCN MKR DOCD: CPT | Performed by: INTERNAL MEDICINE

## 2025-05-15 PROCEDURE — 3078F DIAST BP <80 MM HG: CPT | Performed by: INTERNAL MEDICINE

## 2025-05-15 PROCEDURE — 82607 VITAMIN B-12: CPT

## 2025-05-15 PROCEDURE — 82728 ASSAY OF FERRITIN: CPT

## 2025-05-15 PROCEDURE — 99213 OFFICE O/P EST LOW 20 MIN: CPT | Performed by: INTERNAL MEDICINE

## 2025-05-15 PROCEDURE — 80053 COMPREHEN METABOLIC PANEL: CPT

## 2025-05-15 RX ORDER — CIPROFLOXACIN 250 MG/1
250 TABLET, FILM COATED ORAL
COMMUNITY

## 2025-05-15 ASSESSMENT — PATIENT HEALTH QUESTIONNAIRE - PHQ9
SUM OF ALL RESPONSES TO PHQ QUESTIONS 1-9: 0
2. FEELING DOWN, DEPRESSED OR HOPELESS: NOT AT ALL
SUM OF ALL RESPONSES TO PHQ QUESTIONS 1-9: 0
1. LITTLE INTEREST OR PLEASURE IN DOING THINGS: NOT AT ALL

## 2025-05-15 NOTE — PROGRESS NOTES
MA Rooming Questions  Patient: Thor Jha  MRN: [id number]    Date: 5/15/2025        1. Do you have any new issues?   no         2. Do you need any refills on medications?    no    3. Have you had any imaging done since your last visit?   no    4. Have you been hospitalized or seen in the emergency room since your last visit here?   no    5. Did the patient have a depression screening completed today? Yes    No data recorded     PHQ-9 Given to (if applicable):               PHQ-9 Score (if applicable):                     [] Positive     []  Negative              Does question #9 need addressed (if applicable)                     [] Yes    []  No               Mary Burger CMA

## 2025-05-21 DIAGNOSIS — E11.9 CONTROLLED TYPE 2 DIABETES MELLITUS WITHOUT COMPLICATION, WITHOUT LONG-TERM CURRENT USE OF INSULIN (HCC): ICD-10-CM

## 2025-05-21 RX ORDER — INSULIN GLARGINE-YFGN 100 [IU]/ML
20 INJECTION, SOLUTION SUBCUTANEOUS NIGHTLY
Qty: 18 ML | Refills: 5 | Status: SHIPPED | OUTPATIENT
Start: 2025-05-21

## 2025-06-18 LAB — DIABETIC RETINOPATHY: NEGATIVE

## 2025-06-23 LAB
A/G RATIO: 2.3 RATIO (ref 0.8–2.6)
ALBUMIN: 4.3 G/DL (ref 3.5–5.2)
ALP BLD-CCNC: 85 U/L (ref 23–144)
ALT SERPL-CCNC: 18 U/L (ref 0–60)
AST SERPL-CCNC: 23 U/L (ref 0–55)
BILIRUB SERPL-MCNC: 0.4 MG/DL (ref 0–1.2)
BUN / CREAT RATIO: 27 (ref 7–25)
BUN BLDV-MCNC: 35 MG/DL (ref 3–29)
CALCIUM SERPL-MCNC: 9.4 MG/DL (ref 8.5–10.5)
CHLORIDE BLD-SCNC: 103 MEQ/L (ref 96–110)
CHOLESTEROL, TOTAL: 150 MG/DL
CO2: 25 MEQ/L (ref 19–32)
CREAT SERPL-MCNC: 1.3 MG/DL (ref 0.5–1.2)
ESTIMATED GLOMERULAR FILTRATION RATE CREATININE EQUATION: 54 MLS/MIN/1.73M2
FASTING STATUS: ABNORMAL
GLOBULIN: 1.9 G/DL (ref 1.9–3.6)
GLUCOSE BLD-MCNC: 184 MG/DL (ref 70–99)
HBA1C MFR BLD: 9.3 %
HDLC SERPL-MCNC: 58 MG/DL
LDL CHOLESTEROL: 73 MG/DL
POTASSIUM SERPL-SCNC: 4.9 MEQ/L (ref 3.4–5.3)
SODIUM BLD-SCNC: 141 MEQ/L (ref 135–148)
TOTAL PROTEIN: 6.2 G/DL (ref 6–8.3)
TRIGL SERPL-MCNC: 94 MG/DL
VLDLC SERPL CALC-MCNC: 19 MG/DL (ref 4–38)

## 2025-06-24 ENCOUNTER — OFFICE VISIT (OUTPATIENT)
Dept: INTERNAL MEDICINE CLINIC | Age: 85
End: 2025-06-24
Payer: MEDICARE

## 2025-06-24 VITALS
WEIGHT: 182.8 LBS | OXYGEN SATURATION: 95 % | HEART RATE: 70 BPM | DIASTOLIC BLOOD PRESSURE: 70 MMHG | SYSTOLIC BLOOD PRESSURE: 122 MMHG | BODY MASS INDEX: 27.8 KG/M2

## 2025-06-24 DIAGNOSIS — I10 ESSENTIAL HYPERTENSION: ICD-10-CM

## 2025-06-24 DIAGNOSIS — F41.9 ANXIETY: ICD-10-CM

## 2025-06-24 DIAGNOSIS — E11.65 TYPE 2 DIABETES MELLITUS WITH HYPERGLYCEMIA, WITHOUT LONG-TERM CURRENT USE OF INSULIN (HCC): Primary | ICD-10-CM

## 2025-06-24 DIAGNOSIS — E78.2 MIXED HYPERLIPIDEMIA: ICD-10-CM

## 2025-06-24 DIAGNOSIS — I51.81 TAKOTSUBO CARDIOMYOPATHY: ICD-10-CM

## 2025-06-24 PROCEDURE — 1123F ACP DISCUSS/DSCN MKR DOCD: CPT | Performed by: INTERNAL MEDICINE

## 2025-06-24 PROCEDURE — 1036F TOBACCO NON-USER: CPT | Performed by: INTERNAL MEDICINE

## 2025-06-24 PROCEDURE — G2211 COMPLEX E/M VISIT ADD ON: HCPCS | Performed by: INTERNAL MEDICINE

## 2025-06-24 PROCEDURE — 3074F SYST BP LT 130 MM HG: CPT | Performed by: INTERNAL MEDICINE

## 2025-06-24 PROCEDURE — 1160F RVW MEDS BY RX/DR IN RCRD: CPT | Performed by: INTERNAL MEDICINE

## 2025-06-24 PROCEDURE — 1159F MED LIST DOCD IN RCRD: CPT | Performed by: INTERNAL MEDICINE

## 2025-06-24 PROCEDURE — 3046F HEMOGLOBIN A1C LEVEL >9.0%: CPT | Performed by: INTERNAL MEDICINE

## 2025-06-24 PROCEDURE — 99214 OFFICE O/P EST MOD 30 MIN: CPT | Performed by: INTERNAL MEDICINE

## 2025-06-24 PROCEDURE — 3078F DIAST BP <80 MM HG: CPT | Performed by: INTERNAL MEDICINE

## 2025-06-24 PROCEDURE — G8417 CALC BMI ABV UP PARAM F/U: HCPCS | Performed by: INTERNAL MEDICINE

## 2025-06-24 PROCEDURE — G8427 DOCREV CUR MEDS BY ELIG CLIN: HCPCS | Performed by: INTERNAL MEDICINE

## 2025-06-24 RX ORDER — LORAZEPAM 0.5 MG/1
0.5 TABLET ORAL 2 TIMES DAILY PRN
Qty: 30 TABLET | Refills: 0 | Status: SHIPPED | OUTPATIENT
Start: 2025-06-24 | End: 2025-07-24

## 2025-06-24 NOTE — PATIENT INSTRUCTIONS
In 1-2 weeks recheck  a fasting blood draw and also check fingerstick to compare.  I believe your monitor may be inaccurate and showing low sugars when they are actually higher.    IF GLUCOMETER IS OFF, THEN WE'LL NEED TO INCR INSULIN BY 5 UNITS.

## 2025-06-24 NOTE — PROGRESS NOTES
Thor Domínguezn  1940 06/24/25    SUBJECTIVE:    Dm- RECENT A1C SL HIGHER.  HAS LOW SUGARS IN AM BUT NOT LATER IN THE DAY.  TAKES INSULIN QHS.  ALSO ON JARDIANCE AND GLUCOTROL.    Lab Results   Component Value Date    LABA1C 9.3 (H) 06/23/2025    LABA1C 8.9 (H) 03/20/2025    LABA1C 10.3 (H) 10/31/2024     Lab Results   Component Value Date    CREATININE 1.3 (H) 06/23/2025     ** HOWEVER, HIS MONITOR MAY BE OFF?  GLC  RANGE IN AM, BUT HIS LAB DRAW SHOWED .  Yesterday was 148 on accucheck w him,  but on lab was 184.  ** if on recheck in two weeks glc monitor is falsely low, we'll then incr insulin by 5 units to 25 nightly.    Colon ca, had colonoscopy last yr - optional recheck in two yrs.    Hypertension: Stable. Denies CP, SOB, cough, visual changes, dizziness, palpitations or HA.      Lipids:  Is continuing statin therapy and low fat diet.  Tolerating medications w/o myalgias or GI upset.      Prostate CA, stable on meds.  Sees Dr Jauregui.      ANXIETY STABLE W ATIVAN AND RF DUE.  Controlled substances monitoring: possible medication side effects, risk of tolerance and/or dependence, and alternative treatments discussed, no signs of potential drug abuse or diversion identified and [place] report reviewed today- activity consistent with treatment plan.    Cardiomyopathy, stable also seeing Dr Pepper- NO CURRENT SX CP OR SOB.    OBJECTIVE:    /70 (BP Site: Left Upper Arm, Patient Position: Sitting, BP Cuff Size: Medium Adult)   Pulse 70   Wt 82.9 kg (182 lb 12.8 oz)   SpO2 95%   BMI 27.80 kg/m²     Physical Exam  Constitutional:       Appearance: Normal appearance.   Cardiovascular:      Rate and Rhythm: Normal rate and regular rhythm.      Heart sounds: No murmur heard.     No gallop.   Pulmonary:      Effort: No respiratory distress.      Breath sounds: No wheezing.   Abdominal:      General: Abdomen is flat. Bowel sounds are normal. There is no distension.      Palpations: Abdomen is

## 2025-06-28 DIAGNOSIS — E11.22 TYPE 2 DIABETES MELLITUS WITH STAGE 3 CHRONIC KIDNEY DISEASE, WITHOUT LONG-TERM CURRENT USE OF INSULIN, UNSPECIFIED WHETHER STAGE 3A OR 3B CKD (HCC): ICD-10-CM

## 2025-06-28 DIAGNOSIS — N18.30 TYPE 2 DIABETES MELLITUS WITH STAGE 3 CHRONIC KIDNEY DISEASE, WITHOUT LONG-TERM CURRENT USE OF INSULIN, UNSPECIFIED WHETHER STAGE 3A OR 3B CKD (HCC): ICD-10-CM

## 2025-06-30 RX ORDER — GLIPIZIDE 10 MG/1
TABLET, FILM COATED, EXTENDED RELEASE ORAL
Qty: 180 TABLET | Refills: 1 | Status: SHIPPED | OUTPATIENT
Start: 2025-06-30

## 2025-07-14 LAB
BUN / CREAT RATIO: 26 (ref 7–25)
BUN BLDV-MCNC: 34 MG/DL (ref 3–29)
CALCIUM SERPL-MCNC: 9.5 MG/DL (ref 8.5–10.5)
CHLORIDE BLD-SCNC: 101 MEQ/L (ref 96–110)
CO2: 25 MEQ/L (ref 19–32)
CREAT SERPL-MCNC: 1.3 MG/DL (ref 0.5–1.2)
ESTIMATED GLOMERULAR FILTRATION RATE CREATININE EQUATION: 54 MLS/MIN/1.73M2
FASTING STATUS: ABNORMAL
GLUCOSE BLD-MCNC: 155 MG/DL (ref 70–99)
POTASSIUM SERPL-SCNC: 4.3 MEQ/L (ref 3.4–5.3)
SODIUM BLD-SCNC: 140 MEQ/L (ref 135–148)

## 2025-07-19 DIAGNOSIS — E11.22 TYPE 2 DIABETES MELLITUS WITH STAGE 3 CHRONIC KIDNEY DISEASE, WITHOUT LONG-TERM CURRENT USE OF INSULIN, UNSPECIFIED WHETHER STAGE 3A OR 3B CKD (HCC): ICD-10-CM

## 2025-07-19 DIAGNOSIS — N18.30 TYPE 2 DIABETES MELLITUS WITH STAGE 3 CHRONIC KIDNEY DISEASE, WITHOUT LONG-TERM CURRENT USE OF INSULIN, UNSPECIFIED WHETHER STAGE 3A OR 3B CKD (HCC): ICD-10-CM

## 2025-07-21 RX ORDER — EMPAGLIFLOZIN 25 MG/1
25 TABLET, FILM COATED ORAL EVERY MORNING
Qty: 90 TABLET | Refills: 1 | Status: SHIPPED | OUTPATIENT
Start: 2025-07-21

## 2025-07-22 ENCOUNTER — OFFICE VISIT (OUTPATIENT)
Dept: CARDIOLOGY CLINIC | Age: 85
End: 2025-07-22
Payer: MEDICARE

## 2025-07-22 ENCOUNTER — TELEPHONE (OUTPATIENT)
Dept: CARDIOLOGY CLINIC | Age: 85
End: 2025-07-22

## 2025-07-22 VITALS
HEART RATE: 58 BPM | WEIGHT: 179 LBS | BODY MASS INDEX: 27.13 KG/M2 | SYSTOLIC BLOOD PRESSURE: 128 MMHG | DIASTOLIC BLOOD PRESSURE: 76 MMHG | HEIGHT: 68 IN

## 2025-07-22 DIAGNOSIS — I51.81 TAKOTSUBO CARDIOMYOPATHY: ICD-10-CM

## 2025-07-22 DIAGNOSIS — E78.2 MIXED HYPERLIPIDEMIA: ICD-10-CM

## 2025-07-22 DIAGNOSIS — E11.22 TYPE 2 DIABETES MELLITUS WITH STAGE 3B CHRONIC KIDNEY DISEASE, WITHOUT LONG-TERM CURRENT USE OF INSULIN (HCC): ICD-10-CM

## 2025-07-22 DIAGNOSIS — I70.0 ATHEROSCLEROSIS OF AORTA: Primary | ICD-10-CM

## 2025-07-22 DIAGNOSIS — E11.9 CONTROLLED TYPE 2 DIABETES MELLITUS WITHOUT COMPLICATION, WITHOUT LONG-TERM CURRENT USE OF INSULIN (HCC): ICD-10-CM

## 2025-07-22 DIAGNOSIS — N18.32 TYPE 2 DIABETES MELLITUS WITH STAGE 3B CHRONIC KIDNEY DISEASE, WITHOUT LONG-TERM CURRENT USE OF INSULIN (HCC): ICD-10-CM

## 2025-07-22 DIAGNOSIS — I10 PRIMARY HYPERTENSION: ICD-10-CM

## 2025-07-22 DIAGNOSIS — I50.33 ACUTE ON CHRONIC DIASTOLIC CONGESTIVE HEART FAILURE (HCC): ICD-10-CM

## 2025-07-22 PROCEDURE — G8417 CALC BMI ABV UP PARAM F/U: HCPCS | Performed by: INTERNAL MEDICINE

## 2025-07-22 PROCEDURE — 3074F SYST BP LT 130 MM HG: CPT | Performed by: INTERNAL MEDICINE

## 2025-07-22 PROCEDURE — 1036F TOBACCO NON-USER: CPT | Performed by: INTERNAL MEDICINE

## 2025-07-22 PROCEDURE — 1159F MED LIST DOCD IN RCRD: CPT | Performed by: INTERNAL MEDICINE

## 2025-07-22 PROCEDURE — 3078F DIAST BP <80 MM HG: CPT | Performed by: INTERNAL MEDICINE

## 2025-07-22 PROCEDURE — 3046F HEMOGLOBIN A1C LEVEL >9.0%: CPT | Performed by: INTERNAL MEDICINE

## 2025-07-22 PROCEDURE — G8427 DOCREV CUR MEDS BY ELIG CLIN: HCPCS | Performed by: INTERNAL MEDICINE

## 2025-07-22 PROCEDURE — 99214 OFFICE O/P EST MOD 30 MIN: CPT | Performed by: INTERNAL MEDICINE

## 2025-07-22 PROCEDURE — 1123F ACP DISCUSS/DSCN MKR DOCD: CPT | Performed by: INTERNAL MEDICINE

## 2025-07-22 NOTE — PROGRESS NOTES
CLINICAL STAFF DOCUMENTATION    Dr. Robi Jha  1940  [id number]    Have you had any Chest Pain recently? - No          Have you had any Shortness of Breath - No      Have you had any dizziness - No      Have you had any palpitations recently? - No    Any thyroid issues? - No    Do you have any edema - swelling in No      When did you have your last labs drawn 6/2025    Do we have the labs in their chart Yes      Do you need any prescriptions refilled? - No    Do you have a surgery or procedure scheduled in the near future - No      Do use tobacco products? - No  Do you drink alcohol? - No  Do you use any illicit drugs? - No  Caffeine? - very little        Check medication list thoroughly!!! AND RECONCILE OUTSIDE MEDICATIONS  If dose has changed change the entire order not just the MG  BE SURE TO ASK PATIENT IF THEY NEED MEDICATION REFILLS  Verify Pharmacy and update if incorrect    Add to every patient's \"wrap up\" the following dot phrase AFTERVISITCARDIOHEARTHOUSE and ensure we explain this to our patients   
is 38 mmHg.    Pericardium: No pericardial effusion.    Image quality is good.    All labs, medications and tests reviewed by myself including data and history from outside source , patient and available family .  Assessment & Plan:      1. Atherosclerosis of aorta    2. Controlled type 2 diabetes mellitus without complication, without long-term current use of insulin ([facility])    3. Mixed hyperlipidemia    4. Primary hypertension    5. Takotsubo cardiomyopathy    6. Type 2 diabetes mellitus with stage 3b chronic kidney disease, without long-term current use of insulin ([facility])               Takotsubo cardiomyopathy  Improved now . No signs of ankle swelling or volume overload.  He is not on any diuretics or beta-blockers anymore stopped metoprolol on his own ( did not like how it made him feel)   Echo in Dec 2017 showed EF  30 % takatsobo cardiomyopathy pattern . He was intolerant of Coreg and propranolol , still on  lisinopril 40 mg daily  but stopped metoprolol on his own he had a  Repeat echo in March 2018  Showing  improved LV function .  Repeat echo in April 2024 showed normal EF of 55%   continue current meds  Of amlodipine 5 mg and lisinopril 40 mg, I would prefer for him to be on beta blockers but he is not tolerating it continue to monitor and will hold off, will repeat echo later in 2025 due to hormone Rx    Chronic kidney disease   Creatinine is up to 1.3 and 1.4 followed by PCP he is on lisinopril      essential hypertension  He says his blood pressures fairly well-controlled at home but runs high here in office , blood pressure log shows excellent numbers  Encouraged to exercise she walks 30 minutes every day  Continue hctz 25 mg daily , amlodipine 5 mg daily and lisinopril 40 mg daily     Atherosclerosis of aorta  Continue aspirin and statins. He thinks aspirin gives him Gi discomfort .     Diabetes  Hba1c is 10.3 managed by Dr Kaye will start insulin soon       Dyslipidemia :  Thor had lab work

## 2025-07-22 NOTE — PATIENT INSTRUCTIONS
Thank you for allowing us to care for you today!   We want to ensure we can follow your treatment plan and we strive to give you the best outcomes and experience possible.   If you ever have a life threatening emergency and call 911 - for an ambulance (EMS)  REMEMBER  Our providers can only care for you at:   [facility] or [facility]   Even if you have someone take you or you drive yourself we can only care for you in a [facility] facility. Our providers are not setup at the other healthcare locations!    PLEASE CALL OUR OFFICE DURING NORMAL BUSINESS HOURS  Monday through Friday 8 am to 5 pm  AFTER HOURS the physician on-call cannot help with scheduling, rescheduling, procedure instruction questions or any type of medication need or issue.  [facility] P:[phone] - [facility] P:[phone] - [facility] P:[phone]      If you receive a survey:  We would appreciate you taking the time to share your experience concerning your provider visit in the office.    These surveys are confidential!  We are eager to improve and are counting on you to share your feedback so we can ensure you get the best care possible.

## 2025-08-25 DIAGNOSIS — I10 ESSENTIAL HYPERTENSION: ICD-10-CM

## 2025-08-25 RX ORDER — AMLODIPINE BESYLATE 5 MG/1
5 TABLET ORAL DAILY
Qty: 90 TABLET | Refills: 1 | Status: SHIPPED | OUTPATIENT
Start: 2025-08-25